# Patient Record
Sex: FEMALE | Race: BLACK OR AFRICAN AMERICAN | NOT HISPANIC OR LATINO | URBAN - METROPOLITAN AREA
[De-identification: names, ages, dates, MRNs, and addresses within clinical notes are randomized per-mention and may not be internally consistent; named-entity substitution may affect disease eponyms.]

---

## 2017-01-30 ENCOUNTER — OUTPATIENT (OUTPATIENT)
Dept: OUTPATIENT SERVICES | Facility: HOSPITAL | Age: 32
LOS: 1 days | Discharge: HOME | End: 2017-01-30

## 2017-01-30 ENCOUNTER — APPOINTMENT (OUTPATIENT)
Dept: OTOLARYNGOLOGY | Facility: CLINIC | Age: 32
End: 2017-01-30

## 2017-01-30 VITALS
HEART RATE: 61 BPM | WEIGHT: 200 LBS | BODY MASS INDEX: 33.32 KG/M2 | HEIGHT: 65 IN | SYSTOLIC BLOOD PRESSURE: 110 MMHG | DIASTOLIC BLOOD PRESSURE: 71 MMHG

## 2017-01-30 DIAGNOSIS — Z87.891 PERSONAL HISTORY OF NICOTINE DEPENDENCE: ICD-10-CM

## 2017-01-30 DIAGNOSIS — Z78.9 OTHER SPECIFIED HEALTH STATUS: ICD-10-CM

## 2017-01-30 DIAGNOSIS — M50.221 OTHER CERVICAL DISC DISPLACEMENT AT C4-C5 LEVEL: ICD-10-CM

## 2017-01-30 DIAGNOSIS — Z83.3 FAMILY HISTORY OF DIABETES MELLITUS: ICD-10-CM

## 2017-02-02 ENCOUNTER — RESULT REVIEW (OUTPATIENT)
Age: 32
End: 2017-02-02

## 2017-02-07 DIAGNOSIS — C73 MALIGNANT NEOPLASM OF THYROID GLAND: ICD-10-CM

## 2017-02-07 DIAGNOSIS — J38.7 OTHER DISEASES OF LARYNX: ICD-10-CM

## 2017-02-09 ENCOUNTER — FORM ENCOUNTER (OUTPATIENT)
Age: 32
End: 2017-02-09

## 2017-02-10 ENCOUNTER — OUTPATIENT (OUTPATIENT)
Dept: OUTPATIENT SERVICES | Facility: HOSPITAL | Age: 32
LOS: 1 days | End: 2017-02-10
Payer: MEDICAID

## 2017-02-10 ENCOUNTER — APPOINTMENT (OUTPATIENT)
Dept: ULTRASOUND IMAGING | Facility: IMAGING CENTER | Age: 32
End: 2017-02-10

## 2017-02-10 DIAGNOSIS — Z00.8 ENCOUNTER FOR OTHER GENERAL EXAMINATION: ICD-10-CM

## 2017-02-13 ENCOUNTER — APPOINTMENT (OUTPATIENT)
Dept: OTOLARYNGOLOGY | Facility: CLINIC | Age: 32
End: 2017-02-13

## 2017-02-13 VITALS
SYSTOLIC BLOOD PRESSURE: 108 MMHG | WEIGHT: 200 LBS | HEIGHT: 65 IN | DIASTOLIC BLOOD PRESSURE: 74 MMHG | BODY MASS INDEX: 33.32 KG/M2 | HEART RATE: 69 BPM

## 2017-02-21 ENCOUNTER — APPOINTMENT (OUTPATIENT)
Dept: SPINE | Facility: CLINIC | Age: 32
End: 2017-02-21

## 2017-03-20 ENCOUNTER — OUTPATIENT (OUTPATIENT)
Dept: OUTPATIENT SERVICES | Facility: HOSPITAL | Age: 32
LOS: 1 days | End: 2017-03-20

## 2017-03-20 VITALS
OXYGEN SATURATION: 99 % | HEIGHT: 64 IN | WEIGHT: 216.05 LBS | RESPIRATION RATE: 16 BRPM | DIASTOLIC BLOOD PRESSURE: 68 MMHG | HEART RATE: 65 BPM | TEMPERATURE: 99 F | SYSTOLIC BLOOD PRESSURE: 112 MMHG

## 2017-03-20 DIAGNOSIS — C73 MALIGNANT NEOPLASM OF THYROID GLAND: ICD-10-CM

## 2017-03-20 DIAGNOSIS — Z98.890 OTHER SPECIFIED POSTPROCEDURAL STATES: Chronic | ICD-10-CM

## 2017-03-20 LAB
BUN SERPL-MCNC: 12 MG/DL — SIGNIFICANT CHANGE UP (ref 7–23)
CALCIUM SERPL-MCNC: 9 MG/DL — SIGNIFICANT CHANGE UP (ref 8.4–10.5)
CHLORIDE SERPL-SCNC: 102 MMOL/L — SIGNIFICANT CHANGE UP (ref 98–107)
CO2 SERPL-SCNC: 26 MMOL/L — SIGNIFICANT CHANGE UP (ref 22–31)
CREAT SERPL-MCNC: 0.61 MG/DL — SIGNIFICANT CHANGE UP (ref 0.5–1.3)
GLUCOSE SERPL-MCNC: 76 MG/DL — SIGNIFICANT CHANGE UP (ref 70–99)
HCG SERPL-ACNC: < 5 MIU/ML — SIGNIFICANT CHANGE UP
HCT VFR BLD CALC: 39 % — SIGNIFICANT CHANGE UP (ref 34.5–45)
HGB BLD-MCNC: 12.8 G/DL — SIGNIFICANT CHANGE UP (ref 11.5–15.5)
MCHC RBC-ENTMCNC: 28.2 PG — SIGNIFICANT CHANGE UP (ref 27–34)
MCHC RBC-ENTMCNC: 32.8 % — SIGNIFICANT CHANGE UP (ref 32–36)
MCV RBC AUTO: 85.9 FL — SIGNIFICANT CHANGE UP (ref 80–100)
PLATELET # BLD AUTO: 271 K/UL — SIGNIFICANT CHANGE UP (ref 150–400)
PMV BLD: 10 FL — SIGNIFICANT CHANGE UP (ref 7–13)
POTASSIUM SERPL-MCNC: 4 MMOL/L — SIGNIFICANT CHANGE UP (ref 3.5–5.3)
POTASSIUM SERPL-SCNC: 4 MMOL/L — SIGNIFICANT CHANGE UP (ref 3.5–5.3)
RBC # BLD: 4.54 M/UL — SIGNIFICANT CHANGE UP (ref 3.8–5.2)
RBC # FLD: 13.5 % — SIGNIFICANT CHANGE UP (ref 10.3–14.5)
SODIUM SERPL-SCNC: 142 MMOL/L — SIGNIFICANT CHANGE UP (ref 135–145)
TSH SERPL-MCNC: 2.1 UIU/ML — SIGNIFICANT CHANGE UP (ref 0.27–4.2)
WBC # BLD: 7.44 K/UL — SIGNIFICANT CHANGE UP (ref 3.8–10.5)
WBC # FLD AUTO: 7.44 K/UL — SIGNIFICANT CHANGE UP (ref 3.8–10.5)

## 2017-03-20 RX ORDER — SODIUM CHLORIDE 9 MG/ML
3 INJECTION INTRAMUSCULAR; INTRAVENOUS; SUBCUTANEOUS EVERY 8 HOURS
Qty: 0 | Refills: 0 | Status: DISCONTINUED | OUTPATIENT
Start: 2017-04-06 | End: 2017-04-07

## 2017-03-20 RX ORDER — SODIUM CHLORIDE 9 MG/ML
1000 INJECTION, SOLUTION INTRAVENOUS
Qty: 0 | Refills: 0 | Status: DISCONTINUED | OUTPATIENT
Start: 2017-04-06 | End: 2017-04-07

## 2017-03-20 NOTE — H&P PST ADULT - RS GEN PE MLT RESP DETAILS PC
breath sounds equal/no chest wall tenderness/airway patent/respirations non-labored/clear to auscultation bilaterally/good air movement

## 2017-03-20 NOTE — H&P PST ADULT - PMH
Herniated nucleus pulposus, L4-5    Thyroid cancer  pt reports "my blood levels have never been checked since this all started" Herniated nucleus pulposus, L4-5    Irregular menstruation    Thyroid cancer  pt reports "my  TSH levels have never been checked since this all started" Acid reflux    Herniated nucleus pulposus, L4-5    Irregular menstruation    Thyroid cancer  pt reports "my  TSH levels have never been checked since this all started"

## 2017-03-20 NOTE — H&P PST ADULT - NEGATIVE BREAST SYMPTOMS
no breast lump L/no breast tenderness L/no breast lump R/no breast tenderness R/no nipple discharge L/no nipple discharge R

## 2017-03-20 NOTE — H&P PST ADULT - HISTORY OF PRESENT ILLNESS
30 y/o female with PMH of Irregular Menstrual Periods presents to PST for preoperative evaluation with diagnosis of malignant neoplasm of thyroid gland. Pt reports October 2015 she started to experience dysphagia and dry cough x 1 month. Seen by Endocrinologist and sent for sonogram followed by thyroid biopsy. 32 y/o female with PMH of Irregular Menstrual Periods presents to PST for preoperative evaluation with diagnosis of malignant neoplasm of thyroid gland. Pt reports October 2015 she started to experience dysphagia and dry cough x 1 month. Seen by Endocrinologist and sent for sonogram followed by thyroid biopsy which came back positive for malignancy. Pt still reports occasional dry cough but denies hoarseness or dysphagia. She is scheduled for Right Hemithyroidectomy Possible Total Thyroidectomy with Central Neck Dissection on 3/22/2017 32 y/o female with PMH of Irregular Menstruation and Acid Reflux presents to PST for preoperative evaluation with diagnosis of malignant neoplasm of thyroid gland. Pt reports October 2015 she started to experience dysphagia and dry cough x 1 month. Seen by Endocrinologist and sent for sonogram followed by thyroid biopsy which came back positive for malignancy. Pt still reports occasional dry cough but denies hoarseness or dysphagia. She is scheduled for Right Hemithyroidectomy Possible Total Thyroidectomy with Central Neck Dissection on 3/22/2017 32 y/o female with PMH of Irregular Menstruation and Acid Reflux presents to PST for preoperative evaluation with diagnosis of malignant neoplasm of thyroid gland. Pt reports October 2015 she started to experience dysphagia and dry cough x 1 month. Seen by Endocrinologist and sent for sonogram followed by thyroid biopsy which came back positive for malignancy. Pt still reports occasional dry cough but denies hoarseness or dysphagia. She is scheduled for Right Hemithyroidectomy Possible Total Thyroidectomy with Central Neck Dissection on 4/6/2017

## 2017-03-20 NOTE — H&P PST ADULT - NEGATIVE GASTROINTESTINAL SYMPTOMS
no abdominal pain/no diarrhea/no change in bowel habits/no nausea/no constipation/no vomiting/no melena

## 2017-03-20 NOTE — H&P PST ADULT - NSANTHOSAYNRD_GEN_A_CORE
No. JER screening performed.  STOP BANG Legend: 0-2 = LOW Risk; 3-4 = INTERMEDIATE Risk; 5-8 = HIGH Risk

## 2017-03-20 NOTE — H&P PST ADULT - FAMILY HISTORY
Mother  Still living? Yes, Estimated age: Age Unknown  Family history of thyroid cancer, Age at diagnosis: Age Unknown

## 2017-03-20 NOTE — H&P PST ADULT - NEGATIVE ENMT SYMPTOMS
no post-nasal discharge/no vertigo/no nasal congestion/no throat pain/no hearing difficulty/no dysphagia/no sinus symptoms/no nose bleeds/no tinnitus/no ear pain/no gum bleeding

## 2017-03-20 NOTE — H&P PST ADULT - LYMPHATIC
anterior cervical L/posterior cervical R/posterior cervical L/supraclavicular R/supraclavicular L/anterior cervical R

## 2017-03-20 NOTE — H&P PST ADULT - PROBLEM SELECTOR PLAN 1
Scheduled for Right Hemithyroidectomy Possible Total Thyroidectomy with Central Neck Dissection on 4/6/2017  Pre op instructions given, pt verbalized understanding   Chlorhexidine wash and GI prophylaxis provided

## 2017-03-20 NOTE — H&P PST ADULT - NEGATIVE MUSCULOSKELETAL SYMPTOMS
no muscle weakness/no neck pain/no joint swelling/no muscle cramps/no arthralgia/no back pain/no arthritis

## 2017-04-05 ENCOUNTER — RESULT REVIEW (OUTPATIENT)
Age: 32
End: 2017-04-05

## 2017-04-05 NOTE — ASU PATIENT PROFILE, ADULT - PMH
Acid reflux    Herniated nucleus pulposus, L4-5    Irregular menstruation    Thyroid cancer  pt reports "my  TSH levels have never been checked since this all started"

## 2017-04-06 ENCOUNTER — INPATIENT (INPATIENT)
Facility: HOSPITAL | Age: 32
LOS: 0 days | Discharge: HOME | End: 2017-04-07
Attending: OTOLARYNGOLOGY | Admitting: OTOLARYNGOLOGY
Payer: MEDICAID

## 2017-04-06 ENCOUNTER — APPOINTMENT (OUTPATIENT)
Dept: OTOLARYNGOLOGY | Facility: HOSPITAL | Age: 32
End: 2017-04-06

## 2017-04-06 VITALS
SYSTOLIC BLOOD PRESSURE: 103 MMHG | HEART RATE: 70 BPM | OXYGEN SATURATION: 100 % | DIASTOLIC BLOOD PRESSURE: 75 MMHG | WEIGHT: 216.05 LBS | TEMPERATURE: 98 F | HEIGHT: 64 IN | RESPIRATION RATE: 15 BRPM

## 2017-04-06 DIAGNOSIS — C73 MALIGNANT NEOPLASM OF THYROID GLAND: ICD-10-CM

## 2017-04-06 DIAGNOSIS — Z98.890 OTHER SPECIFIED POSTPROCEDURAL STATES: Chronic | ICD-10-CM

## 2017-04-06 LAB
BLD GP AB SCN SERPL QL: NEGATIVE — SIGNIFICANT CHANGE UP
HCG UR QL: NEGATIVE — SIGNIFICANT CHANGE UP
RH IG SCN BLD-IMP: POSITIVE — SIGNIFICANT CHANGE UP

## 2017-04-06 PROCEDURE — 60220 PARTIAL REMOVAL OF THYROID: CPT | Mod: GC

## 2017-04-06 PROCEDURE — 88307 TISSUE EXAM BY PATHOLOGIST: CPT | Mod: 26

## 2017-04-06 RX ORDER — FENTANYL CITRATE 50 UG/ML
50 INJECTION INTRAVENOUS
Qty: 0 | Refills: 0 | Status: DISCONTINUED | OUTPATIENT
Start: 2017-04-06 | End: 2017-04-07

## 2017-04-06 RX ORDER — SODIUM CHLORIDE 9 MG/ML
1000 INJECTION INTRAMUSCULAR; INTRAVENOUS; SUBCUTANEOUS
Qty: 0 | Refills: 0 | Status: DISCONTINUED | OUTPATIENT
Start: 2017-04-06 | End: 2017-04-07

## 2017-04-06 RX ORDER — SODIUM CHLORIDE 9 MG/ML
500 INJECTION, SOLUTION INTRAVENOUS ONCE
Qty: 0 | Refills: 0 | Status: COMPLETED | OUTPATIENT
Start: 2017-04-06 | End: 2017-04-06

## 2017-04-06 RX ORDER — FENTANYL CITRATE 50 UG/ML
25 INJECTION INTRAVENOUS
Qty: 0 | Refills: 0 | Status: DISCONTINUED | OUTPATIENT
Start: 2017-04-06 | End: 2017-04-07

## 2017-04-06 RX ORDER — OXYCODONE HYDROCHLORIDE 5 MG/1
10 TABLET ORAL ONCE
Qty: 0 | Refills: 0 | Status: DISCONTINUED | OUTPATIENT
Start: 2017-04-06 | End: 2017-04-06

## 2017-04-06 RX ORDER — HYDROMORPHONE HYDROCHLORIDE 2 MG/ML
1 INJECTION INTRAMUSCULAR; INTRAVENOUS; SUBCUTANEOUS ONCE
Qty: 0 | Refills: 0 | Status: DISCONTINUED | OUTPATIENT
Start: 2017-04-06 | End: 2017-04-06

## 2017-04-06 RX ORDER — ONDANSETRON 8 MG/1
4 TABLET, FILM COATED ORAL ONCE
Qty: 0 | Refills: 0 | Status: DISCONTINUED | OUTPATIENT
Start: 2017-04-06 | End: 2017-04-07

## 2017-04-06 RX ADMIN — HYDROMORPHONE HYDROCHLORIDE 1 MILLIGRAM(S): 2 INJECTION INTRAMUSCULAR; INTRAVENOUS; SUBCUTANEOUS at 22:10

## 2017-04-06 RX ADMIN — OXYCODONE HYDROCHLORIDE 10 MILLIGRAM(S): 5 TABLET ORAL at 23:22

## 2017-04-06 RX ADMIN — SODIUM CHLORIDE 3 MILLILITER(S): 9 INJECTION INTRAMUSCULAR; INTRAVENOUS; SUBCUTANEOUS at 22:00

## 2017-04-06 RX ADMIN — OXYCODONE HYDROCHLORIDE 10 MILLIGRAM(S): 5 TABLET ORAL at 22:56

## 2017-04-06 RX ADMIN — FENTANYL CITRATE 50 MICROGRAM(S): 50 INJECTION INTRAVENOUS at 21:45

## 2017-04-06 RX ADMIN — FENTANYL CITRATE 50 MICROGRAM(S): 50 INJECTION INTRAVENOUS at 20:50

## 2017-04-06 RX ADMIN — FENTANYL CITRATE 50 MICROGRAM(S): 50 INJECTION INTRAVENOUS at 21:35

## 2017-04-06 RX ADMIN — SODIUM CHLORIDE 70 MILLILITER(S): 9 INJECTION INTRAMUSCULAR; INTRAVENOUS; SUBCUTANEOUS at 20:17

## 2017-04-06 RX ADMIN — HYDROMORPHONE HYDROCHLORIDE 1 MILLIGRAM(S): 2 INJECTION INTRAMUSCULAR; INTRAVENOUS; SUBCUTANEOUS at 22:04

## 2017-04-06 RX ADMIN — SODIUM CHLORIDE 30 MILLILITER(S): 9 INJECTION, SOLUTION INTRAVENOUS at 14:54

## 2017-04-06 RX ADMIN — FENTANYL CITRATE 50 MICROGRAM(S): 50 INJECTION INTRAVENOUS at 21:05

## 2017-04-06 RX ADMIN — FENTANYL CITRATE 50 MICROGRAM(S): 50 INJECTION INTRAVENOUS at 22:00

## 2017-04-06 RX ADMIN — SODIUM CHLORIDE 1000 MILLILITER(S): 9 INJECTION, SOLUTION INTRAVENOUS at 21:52

## 2017-04-06 NOTE — ASU DISCHARGE PLAN (ADULT/PEDIATRIC). - MEDICATION SUMMARY - MEDICATIONS TO TAKE
I will START or STAY ON the medications listed below when I get home from the hospital:    Percocet 5/325 oral tablet  -- 1 tab(s) by mouth every 6 hours, As Needed, Mild Pain (1 - 3) MDD:6  -- Indication: For Malignant neoplasm of thyroid gland

## 2017-04-07 ENCOUNTER — TRANSCRIPTION ENCOUNTER (OUTPATIENT)
Age: 32
End: 2017-04-07

## 2017-04-07 VITALS
SYSTOLIC BLOOD PRESSURE: 100 MMHG | OXYGEN SATURATION: 99 % | DIASTOLIC BLOOD PRESSURE: 50 MMHG | HEART RATE: 65 BPM | RESPIRATION RATE: 17 BRPM

## 2017-04-07 RX ORDER — ACETAMINOPHEN 500 MG
1000 TABLET ORAL ONCE
Qty: 0 | Refills: 0 | Status: COMPLETED | OUTPATIENT
Start: 2017-04-07 | End: 2017-04-07

## 2017-04-07 RX ADMIN — Medication 1000 MILLIGRAM(S): at 01:11

## 2017-04-07 RX ADMIN — Medication 400 MILLIGRAM(S): at 00:50

## 2017-04-11 ENCOUNTER — APPOINTMENT (OUTPATIENT)
Dept: SPINE | Facility: CLINIC | Age: 32
End: 2017-04-11

## 2017-04-13 ENCOUNTER — APPOINTMENT (OUTPATIENT)
Dept: OTOLARYNGOLOGY | Facility: CLINIC | Age: 32
End: 2017-04-13

## 2017-04-13 DIAGNOSIS — M54.2 CERVICALGIA: ICD-10-CM

## 2017-04-13 PROCEDURE — 76536 US EXAM OF HEAD AND NECK: CPT

## 2017-05-16 ENCOUNTER — APPOINTMENT (OUTPATIENT)
Dept: SPINE | Facility: CLINIC | Age: 32
End: 2017-05-16

## 2017-05-16 VITALS
HEIGHT: 65 IN | DIASTOLIC BLOOD PRESSURE: 72 MMHG | SYSTOLIC BLOOD PRESSURE: 102 MMHG | WEIGHT: 210 LBS | HEART RATE: 73 BPM | BODY MASS INDEX: 34.99 KG/M2

## 2017-05-16 DIAGNOSIS — M51.26 OTHER INTERVERTEBRAL DISC DISPLACEMENT, LUMBAR REGION: ICD-10-CM

## 2017-05-17 ENCOUNTER — APPOINTMENT (OUTPATIENT)
Dept: OTOLARYNGOLOGY | Facility: CLINIC | Age: 32
End: 2017-05-17

## 2017-05-18 ENCOUNTER — APPOINTMENT (OUTPATIENT)
Dept: OTOLARYNGOLOGY | Facility: CLINIC | Age: 32
End: 2017-05-18

## 2017-05-18 VITALS
HEIGHT: 65 IN | WEIGHT: 210 LBS | HEART RATE: 64 BPM | BODY MASS INDEX: 34.99 KG/M2 | SYSTOLIC BLOOD PRESSURE: 114 MMHG | DIASTOLIC BLOOD PRESSURE: 77 MMHG

## 2017-05-23 ENCOUNTER — APPOINTMENT (OUTPATIENT)
Dept: ULTRASOUND IMAGING | Facility: IMAGING CENTER | Age: 32
End: 2017-05-23

## 2017-05-23 ENCOUNTER — OUTPATIENT (OUTPATIENT)
Dept: OUTPATIENT SERVICES | Facility: HOSPITAL | Age: 32
LOS: 1 days | End: 2017-05-23
Payer: MEDICAID

## 2017-05-23 ENCOUNTER — APPOINTMENT (OUTPATIENT)
Dept: OTOLARYNGOLOGY | Facility: CLINIC | Age: 32
End: 2017-05-23

## 2017-05-23 VITALS
WEIGHT: 210 LBS | BODY MASS INDEX: 34.99 KG/M2 | HEART RATE: 64 BPM | DIASTOLIC BLOOD PRESSURE: 60 MMHG | HEIGHT: 65 IN | SYSTOLIC BLOOD PRESSURE: 100 MMHG

## 2017-05-23 DIAGNOSIS — R49.0 DYSPHONIA: ICD-10-CM

## 2017-05-23 DIAGNOSIS — Z98.890 OTHER SPECIFIED POSTPROCEDURAL STATES: Chronic | ICD-10-CM

## 2017-05-23 DIAGNOSIS — C73 MALIGNANT NEOPLASM OF THYROID GLAND: ICD-10-CM

## 2017-05-23 PROCEDURE — 76536 US EXAM OF HEAD AND NECK: CPT

## 2017-05-24 ENCOUNTER — APPOINTMENT (OUTPATIENT)
Dept: ULTRASOUND IMAGING | Facility: IMAGING CENTER | Age: 32
End: 2017-05-24

## 2017-05-29 PROBLEM — R49.0 DYSPHONIA: Status: ACTIVE | Noted: 2017-05-29

## 2017-06-12 ENCOUNTER — APPOINTMENT (OUTPATIENT)
Dept: OTOLARYNGOLOGY | Facility: CLINIC | Age: 32
End: 2017-06-12

## 2017-08-03 ENCOUNTER — RX RENEWAL (OUTPATIENT)
Age: 32
End: 2017-08-03

## 2017-08-03 ENCOUNTER — CLINICAL ADVICE (OUTPATIENT)
Age: 32
End: 2017-08-03

## 2017-08-04 ENCOUNTER — APPOINTMENT (OUTPATIENT)
Dept: RADIOLOGY | Facility: IMAGING CENTER | Age: 32
End: 2017-08-04
Payer: MEDICAID

## 2017-08-04 ENCOUNTER — OUTPATIENT (OUTPATIENT)
Dept: OUTPATIENT SERVICES | Facility: HOSPITAL | Age: 32
LOS: 1 days | End: 2017-08-04
Payer: MEDICAID

## 2017-08-04 DIAGNOSIS — Z00.8 ENCOUNTER FOR OTHER GENERAL EXAMINATION: ICD-10-CM

## 2017-08-04 DIAGNOSIS — M54.16 RADICULOPATHY, LUMBAR REGION: ICD-10-CM

## 2017-08-04 DIAGNOSIS — Z98.890 OTHER SPECIFIED POSTPROCEDURAL STATES: Chronic | ICD-10-CM

## 2017-08-04 PROCEDURE — 72100 X-RAY EXAM L-S SPINE 2/3 VWS: CPT

## 2017-08-04 PROCEDURE — 72100 X-RAY EXAM L-S SPINE 2/3 VWS: CPT | Mod: 26

## 2017-08-08 ENCOUNTER — APPOINTMENT (OUTPATIENT)
Dept: SPINE | Facility: CLINIC | Age: 32
End: 2017-08-08
Payer: MEDICAID

## 2017-08-08 ENCOUNTER — APPOINTMENT (OUTPATIENT)
Dept: MRI IMAGING | Facility: CLINIC | Age: 32
End: 2017-08-08
Payer: MEDICAID

## 2017-08-08 ENCOUNTER — OUTPATIENT (OUTPATIENT)
Dept: OUTPATIENT SERVICES | Facility: HOSPITAL | Age: 32
LOS: 1 days | End: 2017-08-08
Payer: MEDICAID

## 2017-08-08 VITALS
BODY MASS INDEX: 34.99 KG/M2 | DIASTOLIC BLOOD PRESSURE: 70 MMHG | WEIGHT: 210 LBS | HEIGHT: 65 IN | SYSTOLIC BLOOD PRESSURE: 103 MMHG | HEART RATE: 60 BPM

## 2017-08-08 DIAGNOSIS — Z98.890 OTHER SPECIFIED POSTPROCEDURAL STATES: Chronic | ICD-10-CM

## 2017-08-08 DIAGNOSIS — M51.26 OTHER INTERVERTEBRAL DISC DISPLACEMENT, LUMBAR REGION: ICD-10-CM

## 2017-08-08 PROCEDURE — 72148 MRI LUMBAR SPINE W/O DYE: CPT | Mod: 26

## 2017-08-08 PROCEDURE — 72148 MRI LUMBAR SPINE W/O DYE: CPT

## 2017-08-08 PROCEDURE — 99213 OFFICE O/P EST LOW 20 MIN: CPT

## 2017-08-08 RX ORDER — ALBUTEROL SULFATE 2.5 MG/3ML
VIAL, NEBULIZER (ML) INHALATION
Refills: 0 | Status: COMPLETED | COMMUNITY
End: 2017-08-08

## 2017-08-08 RX ORDER — OMEPRAZOLE 40 MG/1
40 CAPSULE, DELAYED RELEASE ORAL
Qty: 30 | Refills: 3 | Status: COMPLETED | COMMUNITY
Start: 2017-02-06 | End: 2017-08-08

## 2017-08-22 ENCOUNTER — APPOINTMENT (OUTPATIENT)
Dept: SPINE | Facility: CLINIC | Age: 32
End: 2017-08-22
Payer: MEDICAID

## 2017-08-22 VITALS
DIASTOLIC BLOOD PRESSURE: 66 MMHG | BODY MASS INDEX: 34.49 KG/M2 | WEIGHT: 207 LBS | HEIGHT: 65 IN | SYSTOLIC BLOOD PRESSURE: 98 MMHG | HEART RATE: 59 BPM

## 2017-08-22 PROCEDURE — 99213 OFFICE O/P EST LOW 20 MIN: CPT

## 2018-01-23 ENCOUNTER — APPOINTMENT (OUTPATIENT)
Dept: OTOLARYNGOLOGY | Facility: CLINIC | Age: 33
End: 2018-01-23
Payer: MEDICAID

## 2018-01-23 ENCOUNTER — OUTPATIENT (OUTPATIENT)
Dept: OUTPATIENT SERVICES | Facility: HOSPITAL | Age: 33
LOS: 1 days | Discharge: HOME | End: 2018-01-23

## 2018-01-23 VITALS
SYSTOLIC BLOOD PRESSURE: 95 MMHG | HEART RATE: 72 BPM | BODY MASS INDEX: 34.49 KG/M2 | HEIGHT: 65 IN | DIASTOLIC BLOOD PRESSURE: 62 MMHG | WEIGHT: 207 LBS

## 2018-01-23 DIAGNOSIS — C73 MALIGNANT NEOPLASM OF THYROID GLAND: ICD-10-CM

## 2018-01-23 DIAGNOSIS — Z98.890 OTHER SPECIFIED POSTPROCEDURAL STATES: Chronic | ICD-10-CM

## 2018-01-23 DIAGNOSIS — K21.9 GASTRO-ESOPHAGEAL REFLUX DISEASE W/OUT ESOPHAGITIS: ICD-10-CM

## 2018-01-23 PROCEDURE — 99214 OFFICE O/P EST MOD 30 MIN: CPT | Mod: 25

## 2018-01-23 PROCEDURE — 31575 DIAGNOSTIC LARYNGOSCOPY: CPT

## 2018-01-23 RX ORDER — FLUTICASONE PROPIONATE 50 UG/1
50 SPRAY, METERED NASAL
Qty: 16 | Refills: 0 | Status: DISCONTINUED | COMMUNITY
Start: 2017-04-27 | End: 2018-01-23

## 2018-01-23 RX ORDER — CYCLOBENZAPRINE HYDROCHLORIDE 5 MG/1
5 TABLET, FILM COATED ORAL EVERY 8 HOURS
Qty: 42 | Refills: 0 | Status: COMPLETED | COMMUNITY
Start: 2017-08-03 | End: 2018-01-23

## 2018-01-23 RX ORDER — ACETAMINOPHEN 500 MG/1
500 TABLET ORAL
Qty: 30 | Refills: 0 | Status: DISCONTINUED | COMMUNITY
Start: 2017-06-09 | End: 2018-01-23

## 2018-01-23 RX ORDER — IBUPROFEN 800 MG/1
800 TABLET, FILM COATED ORAL
Qty: 28 | Refills: 0 | Status: DISCONTINUED | COMMUNITY
Start: 2017-06-15 | End: 2018-01-23

## 2018-01-23 RX ORDER — LORATADINE 10 MG/1
10 TABLET ORAL
Qty: 30 | Refills: 0 | Status: DISCONTINUED | COMMUNITY
Start: 2017-04-04 | End: 2018-01-23

## 2018-01-23 RX ORDER — OXYCODONE AND ACETAMINOPHEN 5; 325 MG/1; MG/1
5-325 TABLET ORAL EVERY 6 HOURS
Qty: 56 | Refills: 0 | Status: DISCONTINUED | COMMUNITY
Start: 2017-08-03 | End: 2018-01-23

## 2018-01-23 RX ORDER — SODIUM CHLORIDE 0.65 %
0.65 AEROSOL, SPRAY (ML) NASAL
Qty: 44 | Refills: 0 | Status: DISCONTINUED | COMMUNITY
Start: 2017-03-10 | End: 2018-01-23

## 2018-01-30 DIAGNOSIS — C73 MALIGNANT NEOPLASM OF THYROID GLAND: ICD-10-CM

## 2018-01-30 DIAGNOSIS — K21.9 GASTRO-ESOPHAGEAL REFLUX DISEASE WITHOUT ESOPHAGITIS: ICD-10-CM

## 2018-04-30 ENCOUNTER — APPOINTMENT (OUTPATIENT)
Dept: OTOLARYNGOLOGY | Facility: CLINIC | Age: 33
End: 2018-04-30

## 2018-08-01 ENCOUNTER — RX RENEWAL (OUTPATIENT)
Age: 33
End: 2018-08-01

## 2018-08-01 PROBLEM — N92.6 IRREGULAR MENSTRUATION, UNSPECIFIED: Chronic | Status: ACTIVE | Noted: 2017-03-20

## 2018-08-01 PROBLEM — K21.9 GASTRO-ESOPHAGEAL REFLUX DISEASE WITHOUT ESOPHAGITIS: Chronic | Status: ACTIVE | Noted: 2017-03-20

## 2018-08-03 ENCOUNTER — OUTPATIENT (OUTPATIENT)
Dept: OUTPATIENT SERVICES | Facility: HOSPITAL | Age: 33
LOS: 1 days | End: 2018-08-03
Payer: MEDICAID

## 2018-08-03 ENCOUNTER — APPOINTMENT (OUTPATIENT)
Dept: RADIOLOGY | Facility: IMAGING CENTER | Age: 33
End: 2018-08-03
Payer: MEDICAID

## 2018-08-03 DIAGNOSIS — Z98.890 OTHER SPECIFIED POSTPROCEDURAL STATES: ICD-10-CM

## 2018-08-03 DIAGNOSIS — Z98.890 OTHER SPECIFIED POSTPROCEDURAL STATES: Chronic | ICD-10-CM

## 2018-08-03 PROCEDURE — 72100 X-RAY EXAM L-S SPINE 2/3 VWS: CPT | Mod: 26

## 2018-08-03 PROCEDURE — 72100 X-RAY EXAM L-S SPINE 2/3 VWS: CPT

## 2018-08-14 ENCOUNTER — APPOINTMENT (OUTPATIENT)
Dept: SPINE | Facility: CLINIC | Age: 33
End: 2018-08-14
Payer: MEDICAID

## 2018-08-14 VITALS
DIASTOLIC BLOOD PRESSURE: 73 MMHG | HEART RATE: 70 BPM | HEIGHT: 65 IN | BODY MASS INDEX: 34.49 KG/M2 | SYSTOLIC BLOOD PRESSURE: 106 MMHG | WEIGHT: 207 LBS

## 2018-08-14 PROCEDURE — 99213 OFFICE O/P EST LOW 20 MIN: CPT

## 2018-08-16 ENCOUNTER — APPOINTMENT (OUTPATIENT)
Dept: MRI IMAGING | Facility: CLINIC | Age: 33
End: 2018-08-16
Payer: MEDICAID

## 2018-08-16 ENCOUNTER — OUTPATIENT (OUTPATIENT)
Dept: OUTPATIENT SERVICES | Facility: HOSPITAL | Age: 33
LOS: 1 days | End: 2018-08-16
Payer: MEDICAID

## 2018-08-16 DIAGNOSIS — Z98.890 OTHER SPECIFIED POSTPROCEDURAL STATES: ICD-10-CM

## 2018-08-16 DIAGNOSIS — Z98.890 OTHER SPECIFIED POSTPROCEDURAL STATES: Chronic | ICD-10-CM

## 2018-08-16 PROCEDURE — A9585: CPT

## 2018-08-16 PROCEDURE — 72158 MRI LUMBAR SPINE W/O & W/DYE: CPT | Mod: 26

## 2018-08-16 PROCEDURE — 72158 MRI LUMBAR SPINE W/O & W/DYE: CPT

## 2018-08-21 ENCOUNTER — APPOINTMENT (OUTPATIENT)
Dept: SPINE | Facility: CLINIC | Age: 33
End: 2018-08-21
Payer: MEDICAID

## 2018-08-21 VITALS
WEIGHT: 208 LBS | HEIGHT: 65 IN | BODY MASS INDEX: 34.66 KG/M2 | SYSTOLIC BLOOD PRESSURE: 110 MMHG | DIASTOLIC BLOOD PRESSURE: 74 MMHG | HEART RATE: 65 BPM

## 2018-08-21 PROCEDURE — 99215 OFFICE O/P EST HI 40 MIN: CPT

## 2018-09-13 ENCOUNTER — OUTPATIENT (OUTPATIENT)
Dept: OUTPATIENT SERVICES | Facility: HOSPITAL | Age: 33
LOS: 1 days | End: 2018-09-13
Payer: MEDICAID

## 2018-09-13 VITALS
SYSTOLIC BLOOD PRESSURE: 100 MMHG | DIASTOLIC BLOOD PRESSURE: 62 MMHG | OXYGEN SATURATION: 100 % | WEIGHT: 214.07 LBS | TEMPERATURE: 99 F | HEIGHT: 65 IN | HEART RATE: 60 BPM | RESPIRATION RATE: 16 BRPM

## 2018-09-13 DIAGNOSIS — E89.0 POSTPROCEDURAL HYPOTHYROIDISM: Chronic | ICD-10-CM

## 2018-09-13 DIAGNOSIS — M51.26 OTHER INTERVERTEBRAL DISC DISPLACEMENT, LUMBAR REGION: ICD-10-CM

## 2018-09-13 DIAGNOSIS — Z98.890 OTHER SPECIFIED POSTPROCEDURAL STATES: Chronic | ICD-10-CM

## 2018-09-13 DIAGNOSIS — Z01.818 ENCOUNTER FOR OTHER PREPROCEDURAL EXAMINATION: ICD-10-CM

## 2018-09-13 DIAGNOSIS — Z29.9 ENCOUNTER FOR PROPHYLACTIC MEASURES, UNSPECIFIED: ICD-10-CM

## 2018-09-13 LAB
ANION GAP SERPL CALC-SCNC: 11 MMOL/L — SIGNIFICANT CHANGE UP (ref 5–17)
BLD GP AB SCN SERPL QL: NEGATIVE — SIGNIFICANT CHANGE UP
BUN SERPL-MCNC: 7 MG/DL — SIGNIFICANT CHANGE UP (ref 7–23)
CALCIUM SERPL-MCNC: 9.1 MG/DL — SIGNIFICANT CHANGE UP (ref 8.4–10.5)
CHLORIDE SERPL-SCNC: 99 MMOL/L — SIGNIFICANT CHANGE UP (ref 96–108)
CO2 SERPL-SCNC: 28 MMOL/L — SIGNIFICANT CHANGE UP (ref 22–31)
CREAT SERPL-MCNC: 0.71 MG/DL — SIGNIFICANT CHANGE UP (ref 0.5–1.3)
GLUCOSE SERPL-MCNC: 76 MG/DL — SIGNIFICANT CHANGE UP (ref 70–99)
HCT VFR BLD CALC: 40.3 % — SIGNIFICANT CHANGE UP (ref 34.5–45)
HGB BLD-MCNC: 12.7 G/DL — SIGNIFICANT CHANGE UP (ref 11.5–15.5)
HIV 1+2 AB+HIV1 P24 AG SERPL QL IA: SIGNIFICANT CHANGE UP
MCHC RBC-ENTMCNC: 27.3 PG — SIGNIFICANT CHANGE UP (ref 27–34)
MCHC RBC-ENTMCNC: 31.5 GM/DL — LOW (ref 32–36)
MCV RBC AUTO: 86.5 FL — SIGNIFICANT CHANGE UP (ref 80–100)
MRSA PCR RESULT.: SIGNIFICANT CHANGE UP
PLATELET # BLD AUTO: 278 K/UL — SIGNIFICANT CHANGE UP (ref 150–400)
POTASSIUM SERPL-MCNC: 4.2 MMOL/L — SIGNIFICANT CHANGE UP (ref 3.5–5.3)
POTASSIUM SERPL-SCNC: 4.2 MMOL/L — SIGNIFICANT CHANGE UP (ref 3.5–5.3)
RBC # BLD: 4.66 M/UL — SIGNIFICANT CHANGE UP (ref 3.8–5.2)
RBC # FLD: 14.4 % — SIGNIFICANT CHANGE UP (ref 10.3–14.5)
RH IG SCN BLD-IMP: POSITIVE — SIGNIFICANT CHANGE UP
S AUREUS DNA NOSE QL NAA+PROBE: SIGNIFICANT CHANGE UP
SODIUM SERPL-SCNC: 138 MMOL/L — SIGNIFICANT CHANGE UP (ref 135–145)
WBC # BLD: 7.53 K/UL — SIGNIFICANT CHANGE UP (ref 3.8–10.5)
WBC # FLD AUTO: 7.53 K/UL — SIGNIFICANT CHANGE UP (ref 3.8–10.5)

## 2018-09-13 PROCEDURE — 87640 STAPH A DNA AMP PROBE: CPT

## 2018-09-13 PROCEDURE — 86850 RBC ANTIBODY SCREEN: CPT

## 2018-09-13 PROCEDURE — 80048 BASIC METABOLIC PNL TOTAL CA: CPT

## 2018-09-13 PROCEDURE — 87641 MR-STAPH DNA AMP PROBE: CPT

## 2018-09-13 PROCEDURE — 87389 HIV-1 AG W/HIV-1&-2 AB AG IA: CPT

## 2018-09-13 PROCEDURE — 85027 COMPLETE CBC AUTOMATED: CPT

## 2018-09-13 PROCEDURE — 86900 BLOOD TYPING SEROLOGIC ABO: CPT

## 2018-09-13 PROCEDURE — 86901 BLOOD TYPING SEROLOGIC RH(D): CPT

## 2018-09-13 PROCEDURE — G0463: CPT

## 2018-09-13 RX ORDER — LIDOCAINE HCL 20 MG/ML
0.2 VIAL (ML) INJECTION ONCE
Qty: 0 | Refills: 0 | Status: DISCONTINUED | OUTPATIENT
Start: 2018-09-20 | End: 2018-09-20

## 2018-09-13 RX ORDER — CEFAZOLIN SODIUM 1 G
2000 VIAL (EA) INJECTION ONCE
Qty: 0 | Refills: 0 | Status: DISCONTINUED | OUTPATIENT
Start: 2018-09-20 | End: 2018-09-23

## 2018-09-13 RX ORDER — SODIUM CHLORIDE 9 MG/ML
3 INJECTION INTRAMUSCULAR; INTRAVENOUS; SUBCUTANEOUS EVERY 8 HOURS
Qty: 0 | Refills: 0 | Status: DISCONTINUED | OUTPATIENT
Start: 2018-09-20 | End: 2018-09-20

## 2018-09-13 NOTE — H&P PST ADULT - MUSCULOSKELETAL
details… detailed exam ROM intact/normal strength/no joint swelling/no joint erythema/no joint warmth

## 2018-09-13 NOTE — H&P PST ADULT - ASSESSMENT
CAPRINI SCORE [CLOT]    AGE RELATED RISK FACTORS                                                       MOBILITY RELATED FACTORS  [ ] Age 41-60 years                                            (1 Point)                  [ ] Bed rest                                                        (1 Point)  [ ] Age: 61-74 years                                           (2 Points)                 [ ] Plaster cast                                                   (2 Points)  [ ] Age= 75 years                                              (3 Points)                 [ ] Bed bound for more than 72 hours                 (2 Points)    DISEASE RELATED RISK FACTORS                                               GENDER SPECIFIC FACTORS  [ ] Edema in the lower extremities                       (1 Point)                  [ ] Pregnancy                                                     (1 Point)  [ ] Varicose veins                                               (1 Point)                  [ ] Post-partum < 6 weeks                                   (1 Point)             [x ] BMI > 25 Kg/m2                                            (1 Point)                  [ ] Hormonal therapy  or oral contraception          (1 Point)                 [ ] Sepsis (in the previous month)                        (1 Point)                  [ ] History of pregnancy complications                 (1 point)  [ ] Pneumonia or serious lung disease                                               [ ] Unexplained or recurrent                     (1 Point)           (in the previous month)                               (1 Point)  [ ] Abnormal pulmonary function test                     (1 Point)                 SURGERY RELATED RISK FACTORS  [ ] Acute myocardial infarction                              (1 Point)                 [ ]  Section                                             (1 Point)  [ ] Congestive heart failure (in the previous month)  (1 Point)               [ ] Minor surgery                                                  (1 Point)   [ ] Inflammatory bowel disease                             (1 Point)                 [ ] Arthroscopic surgery                                        (2 Points)  [ ] Central venous access                                      (2 Points)                [x ] General surgery lasting more than 45 minutes   (2 Points)       [ ] Stroke (in the previous month)                          (5 Points)               [ ] Elective arthroplasty                                         (5 Points)                                                                                                                                               HEMATOLOGY RELATED FACTORS                                                 TRAUMA RELATED RISK FACTORS  [ ] Prior episodes of VTE                                     (3 Points)                 [ ] Fracture of the hip, pelvis, or leg                       (5 Points)  [ ] Positive family history for VTE                         (3 Points)                 [ ] Acute spinal cord injury (in the previous month)  (5 Points)  [ ] Prothrombin 59896 A                                     (3 Points)                 [ ] Paralysis  (less than 1 month)                             (5 Points)  [ ] Factor V Leiden                                             (3 Points)                  [ ] Multiple Trauma within 1 month                        (5 Points)  [ ] Lupus anticoagulants                                     (3 Points)                                                           [ ] Anticardiolipin antibodies                               (3 Points)                                                       [ ] High homocysteine in the blood                      (3 Points)                                             [ ] Other congenital or acquired thrombophilia      (3 Points)                                                [ ] Heparin induced thrombocytopenia                  (3 Points)                                          Total Score [     3     ]

## 2018-09-13 NOTE — H&P PST ADULT - PMH
Acid reflux    Herniated nucleus pulposus, L4-5  H/O microdiscectomy  11/2016  Irregular menstruation    Lumbar herniated disc    Paresthesia of right leg    Thyroid cancer  s/p partial thyroidectomy 2017

## 2018-09-13 NOTE — H&P PST ADULT - NEUROLOGICAL DETAILS
responds to pain/normal strength/alert and oriented x 3/responds to verbal commands/no spontaneous movement

## 2018-09-13 NOTE — H&P PST ADULT - HISTORY OF PRESENT ILLNESS
33 years old female H/O Irregular Menstruation, Acid Reflux Thyroid CA s/p partial thyroidectomy 2017, Herniated nucleus pulposus s/p microdiscectomy (11/2016)   C/O lumbar pain shooting down to RLE started about 2 month ago with numbness and tingling to Right foot now scheduled for L4-L5 Posterior Lumbar Interbody Fusion on 9/20/18.  She curently denies gait instability, loss of bowel or bladder function.

## 2018-09-19 ENCOUNTER — TRANSCRIPTION ENCOUNTER (OUTPATIENT)
Age: 33
End: 2018-09-19

## 2018-09-20 ENCOUNTER — APPOINTMENT (OUTPATIENT)
Dept: SPINE | Facility: HOSPITAL | Age: 33
End: 2018-09-20
Payer: MEDICAID

## 2018-09-20 ENCOUNTER — INPATIENT (INPATIENT)
Facility: HOSPITAL | Age: 33
LOS: 4 days | Discharge: HOME | DRG: 460 | End: 2018-09-25
Attending: NEUROLOGICAL SURGERY | Admitting: NEUROLOGICAL SURGERY
Payer: MEDICAID

## 2018-09-20 VITALS
SYSTOLIC BLOOD PRESSURE: 111 MMHG | RESPIRATION RATE: 16 BRPM | HEIGHT: 65 IN | WEIGHT: 214.07 LBS | DIASTOLIC BLOOD PRESSURE: 74 MMHG | HEART RATE: 53 BPM | TEMPERATURE: 99 F | OXYGEN SATURATION: 99 %

## 2018-09-20 DIAGNOSIS — Z98.890 OTHER SPECIFIED POSTPROCEDURAL STATES: Chronic | ICD-10-CM

## 2018-09-20 DIAGNOSIS — E89.0 POSTPROCEDURAL HYPOTHYROIDISM: Chronic | ICD-10-CM

## 2018-09-20 DIAGNOSIS — M51.26 OTHER INTERVERTEBRAL DISC DISPLACEMENT, LUMBAR REGION: ICD-10-CM

## 2018-09-20 LAB
ANION GAP SERPL CALC-SCNC: 10 MMOL/L — SIGNIFICANT CHANGE UP (ref 5–17)
ANION GAP SERPL CALC-SCNC: 12 MMOL/L — SIGNIFICANT CHANGE UP (ref 5–17)
BASOPHILS # BLD AUTO: 0 K/UL — SIGNIFICANT CHANGE UP (ref 0–0.2)
BASOPHILS NFR BLD AUTO: 0.1 % — SIGNIFICANT CHANGE UP (ref 0–2)
BUN SERPL-MCNC: 9 MG/DL — SIGNIFICANT CHANGE UP (ref 7–23)
BUN SERPL-MCNC: 9 MG/DL — SIGNIFICANT CHANGE UP (ref 7–23)
CALCIUM SERPL-MCNC: 8 MG/DL — LOW (ref 8.4–10.5)
CALCIUM SERPL-MCNC: 8.1 MG/DL — LOW (ref 8.4–10.5)
CHLORIDE SERPL-SCNC: 102 MMOL/L — SIGNIFICANT CHANGE UP (ref 96–108)
CHLORIDE SERPL-SCNC: 102 MMOL/L — SIGNIFICANT CHANGE UP (ref 96–108)
CO2 SERPL-SCNC: 24 MMOL/L — SIGNIFICANT CHANGE UP (ref 22–31)
CO2 SERPL-SCNC: 25 MMOL/L — SIGNIFICANT CHANGE UP (ref 22–31)
CREAT SERPL-MCNC: 0.68 MG/DL — SIGNIFICANT CHANGE UP (ref 0.5–1.3)
CREAT SERPL-MCNC: 0.69 MG/DL — SIGNIFICANT CHANGE UP (ref 0.5–1.3)
EOSINOPHIL # BLD AUTO: 0.1 K/UL — SIGNIFICANT CHANGE UP (ref 0–0.5)
EOSINOPHIL NFR BLD AUTO: 0.5 % — SIGNIFICANT CHANGE UP (ref 0–6)
GLUCOSE BLDC GLUCOMTR-MCNC: 127 MG/DL — HIGH (ref 70–99)
GLUCOSE BLDC GLUCOMTR-MCNC: 94 MG/DL — SIGNIFICANT CHANGE UP (ref 70–99)
GLUCOSE SERPL-MCNC: 110 MG/DL — HIGH (ref 70–99)
GLUCOSE SERPL-MCNC: 99 MG/DL — SIGNIFICANT CHANGE UP (ref 70–99)
HCG UR QL: NEGATIVE — SIGNIFICANT CHANGE UP
HCT VFR BLD CALC: 36.5 % — SIGNIFICANT CHANGE UP (ref 34.5–45)
HGB BLD-MCNC: 12 G/DL — SIGNIFICANT CHANGE UP (ref 11.5–15.5)
LYMPHOCYTES # BLD AUTO: 2.7 K/UL — SIGNIFICANT CHANGE UP (ref 1–3.3)
LYMPHOCYTES # BLD AUTO: 22.6 % — SIGNIFICANT CHANGE UP (ref 13–44)
MAGNESIUM SERPL-MCNC: 1.8 MG/DL — SIGNIFICANT CHANGE UP (ref 1.6–2.6)
MCHC RBC-ENTMCNC: 28.5 PG — SIGNIFICANT CHANGE UP (ref 27–34)
MCHC RBC-ENTMCNC: 32.9 GM/DL — SIGNIFICANT CHANGE UP (ref 32–36)
MCV RBC AUTO: 86.6 FL — SIGNIFICANT CHANGE UP (ref 80–100)
MONOCYTES # BLD AUTO: 0.6 K/UL — SIGNIFICANT CHANGE UP (ref 0–0.9)
MONOCYTES NFR BLD AUTO: 4.9 % — SIGNIFICANT CHANGE UP (ref 2–14)
NEUTROPHILS # BLD AUTO: 8.7 K/UL — HIGH (ref 1.8–7.4)
NEUTROPHILS NFR BLD AUTO: 71.8 % — SIGNIFICANT CHANGE UP (ref 43–77)
PLATELET # BLD AUTO: 223 K/UL — SIGNIFICANT CHANGE UP (ref 150–400)
POTASSIUM SERPL-MCNC: 2.9 MMOL/L — CRITICAL LOW (ref 3.5–5.3)
POTASSIUM SERPL-MCNC: 3.3 MMOL/L — LOW (ref 3.5–5.3)
POTASSIUM SERPL-SCNC: 2.9 MMOL/L — CRITICAL LOW (ref 3.5–5.3)
POTASSIUM SERPL-SCNC: 3.3 MMOL/L — LOW (ref 3.5–5.3)
RBC # BLD: 4.22 M/UL — SIGNIFICANT CHANGE UP (ref 3.8–5.2)
RBC # FLD: 12.2 % — SIGNIFICANT CHANGE UP (ref 10.3–14.5)
SODIUM SERPL-SCNC: 137 MMOL/L — SIGNIFICANT CHANGE UP (ref 135–145)
SODIUM SERPL-SCNC: 138 MMOL/L — SIGNIFICANT CHANGE UP (ref 135–145)
WBC # BLD: 12.1 K/UL — HIGH (ref 3.8–10.5)
WBC # FLD AUTO: 12.1 K/UL — HIGH (ref 3.8–10.5)

## 2018-09-20 PROCEDURE — 22840 INSERT SPINE FIXATION DEVICE: CPT

## 2018-09-20 PROCEDURE — 22633 ARTHRD CMBN 1NTRSPC LUMBAR: CPT

## 2018-09-20 PROCEDURE — 22853 INSJ BIOMECHANICAL DEVICE: CPT

## 2018-09-20 PROCEDURE — 63047 LAM FACETEC & FORAMOT LUMBAR: CPT | Mod: 59

## 2018-09-20 RX ORDER — METHOCARBAMOL 500 MG/1
500 TABLET, FILM COATED ORAL EVERY 6 HOURS
Qty: 0 | Refills: 0 | Status: DISCONTINUED | OUTPATIENT
Start: 2018-09-20 | End: 2018-09-24

## 2018-09-20 RX ORDER — GLUCAGON INJECTION, SOLUTION 0.5 MG/.1ML
1 INJECTION, SOLUTION SUBCUTANEOUS ONCE
Qty: 0 | Refills: 0 | Status: DISCONTINUED | OUTPATIENT
Start: 2018-09-20 | End: 2018-09-23

## 2018-09-20 RX ORDER — ENOXAPARIN SODIUM 100 MG/ML
40 INJECTION SUBCUTANEOUS AT BEDTIME
Qty: 0 | Refills: 0 | Status: DISCONTINUED | OUTPATIENT
Start: 2018-09-21 | End: 2018-09-25

## 2018-09-20 RX ORDER — POTASSIUM CHLORIDE 20 MEQ
20 PACKET (EA) ORAL
Qty: 0 | Refills: 0 | Status: COMPLETED | OUTPATIENT
Start: 2018-09-20 | End: 2018-09-20

## 2018-09-20 RX ORDER — DEXTROSE 50 % IN WATER 50 %
12.5 SYRINGE (ML) INTRAVENOUS ONCE
Qty: 0 | Refills: 0 | Status: DISCONTINUED | OUTPATIENT
Start: 2018-09-20 | End: 2018-09-23

## 2018-09-20 RX ORDER — HYDROMORPHONE HYDROCHLORIDE 2 MG/ML
30 INJECTION INTRAMUSCULAR; INTRAVENOUS; SUBCUTANEOUS
Qty: 0 | Refills: 0 | Status: DISCONTINUED | OUTPATIENT
Start: 2018-09-20 | End: 2018-09-21

## 2018-09-20 RX ORDER — ASCORBIC ACID 60 MG
500 TABLET,CHEWABLE ORAL
Qty: 0 | Refills: 0 | Status: COMPLETED | OUTPATIENT
Start: 2018-09-20 | End: 2018-09-23

## 2018-09-20 RX ORDER — HYDROMORPHONE HYDROCHLORIDE 2 MG/ML
0.5 INJECTION INTRAMUSCULAR; INTRAVENOUS; SUBCUTANEOUS
Qty: 0 | Refills: 0 | Status: DISCONTINUED | OUTPATIENT
Start: 2018-09-20 | End: 2018-09-22

## 2018-09-20 RX ORDER — DEXTROSE 50 % IN WATER 50 %
25 SYRINGE (ML) INTRAVENOUS ONCE
Qty: 0 | Refills: 0 | Status: DISCONTINUED | OUTPATIENT
Start: 2018-09-20 | End: 2018-09-23

## 2018-09-20 RX ORDER — INSULIN LISPRO 100/ML
VIAL (ML) SUBCUTANEOUS AT BEDTIME
Qty: 0 | Refills: 0 | Status: DISCONTINUED | OUTPATIENT
Start: 2018-09-20 | End: 2018-09-23

## 2018-09-20 RX ORDER — HYDROMORPHONE HYDROCHLORIDE 2 MG/ML
0.5 INJECTION INTRAMUSCULAR; INTRAVENOUS; SUBCUTANEOUS THREE TIMES A DAY
Qty: 0 | Refills: 0 | Status: DISCONTINUED | OUTPATIENT
Start: 2018-09-20 | End: 2018-09-22

## 2018-09-20 RX ORDER — ONDANSETRON 8 MG/1
4 TABLET, FILM COATED ORAL EVERY 6 HOURS
Qty: 0 | Refills: 0 | Status: DISCONTINUED | OUTPATIENT
Start: 2018-09-20 | End: 2018-09-25

## 2018-09-20 RX ORDER — DIPHENHYDRAMINE HCL 50 MG
12.5 CAPSULE ORAL EVERY 4 HOURS
Qty: 0 | Refills: 0 | Status: DISCONTINUED | OUTPATIENT
Start: 2018-09-20 | End: 2018-09-20

## 2018-09-20 RX ORDER — ACETAMINOPHEN 500 MG
1000 TABLET ORAL ONCE
Qty: 0 | Refills: 0 | Status: COMPLETED | OUTPATIENT
Start: 2018-09-20 | End: 2018-09-21

## 2018-09-20 RX ORDER — FAMOTIDINE 10 MG/ML
20 INJECTION INTRAVENOUS
Qty: 0 | Refills: 0 | Status: DISCONTINUED | OUTPATIENT
Start: 2018-09-20 | End: 2018-09-23

## 2018-09-20 RX ORDER — ACETAMINOPHEN 500 MG
1000 TABLET ORAL ONCE
Qty: 0 | Refills: 0 | Status: COMPLETED | OUTPATIENT
Start: 2018-09-20 | End: 2018-09-20

## 2018-09-20 RX ORDER — DEXAMETHASONE 0.5 MG/5ML
4 ELIXIR ORAL EVERY 6 HOURS
Qty: 0 | Refills: 0 | Status: COMPLETED | OUTPATIENT
Start: 2018-09-20 | End: 2018-09-21

## 2018-09-20 RX ORDER — DOCUSATE SODIUM 100 MG
100 CAPSULE ORAL THREE TIMES A DAY
Qty: 0 | Refills: 0 | Status: DISCONTINUED | OUTPATIENT
Start: 2018-09-20 | End: 2018-09-25

## 2018-09-20 RX ORDER — DEXTROSE 50 % IN WATER 50 %
25 SYRINGE (ML) INTRAVENOUS ONCE
Qty: 0 | Refills: 0 | Status: DISCONTINUED | OUTPATIENT
Start: 2018-09-20 | End: 2018-09-25

## 2018-09-20 RX ORDER — BENZOCAINE AND MENTHOL 5; 1 G/100ML; G/100ML
1 LIQUID ORAL EVERY 6 HOURS
Qty: 0 | Refills: 0 | Status: DISCONTINUED | OUTPATIENT
Start: 2018-09-20 | End: 2018-09-25

## 2018-09-20 RX ORDER — GABAPENTIN 400 MG/1
300 CAPSULE ORAL
Qty: 0 | Refills: 0 | Status: DISCONTINUED | OUTPATIENT
Start: 2018-09-20 | End: 2018-09-23

## 2018-09-20 RX ORDER — POTASSIUM CHLORIDE 20 MEQ
40 PACKET (EA) ORAL EVERY 4 HOURS
Qty: 0 | Refills: 0 | Status: DISCONTINUED | OUTPATIENT
Start: 2018-09-20 | End: 2018-09-20

## 2018-09-20 RX ORDER — DEXTROSE MONOHYDRATE, SODIUM CHLORIDE, AND POTASSIUM CHLORIDE 50; .745; 4.5 G/1000ML; G/1000ML; G/1000ML
1000 INJECTION, SOLUTION INTRAVENOUS
Qty: 0 | Refills: 0 | Status: DISCONTINUED | OUTPATIENT
Start: 2018-09-20 | End: 2018-09-22

## 2018-09-20 RX ORDER — ACETAMINOPHEN 500 MG
650 TABLET ORAL EVERY 6 HOURS
Qty: 0 | Refills: 0 | Status: DISCONTINUED | OUTPATIENT
Start: 2018-09-20 | End: 2018-09-25

## 2018-09-20 RX ORDER — HYDROMORPHONE HYDROCHLORIDE 2 MG/ML
30 INJECTION INTRAMUSCULAR; INTRAVENOUS; SUBCUTANEOUS
Qty: 0 | Refills: 0 | Status: DISCONTINUED | OUTPATIENT
Start: 2018-09-20 | End: 2018-09-20

## 2018-09-20 RX ORDER — INSULIN LISPRO 100/ML
VIAL (ML) SUBCUTANEOUS
Qty: 0 | Refills: 0 | Status: DISCONTINUED | OUTPATIENT
Start: 2018-09-20 | End: 2018-09-23

## 2018-09-20 RX ORDER — SODIUM CHLORIDE 9 MG/ML
1000 INJECTION, SOLUTION INTRAVENOUS
Qty: 0 | Refills: 0 | Status: DISCONTINUED | OUTPATIENT
Start: 2018-09-20 | End: 2018-09-23

## 2018-09-20 RX ORDER — SENNA PLUS 8.6 MG/1
2 TABLET ORAL AT BEDTIME
Qty: 0 | Refills: 0 | Status: DISCONTINUED | OUTPATIENT
Start: 2018-09-20 | End: 2018-09-25

## 2018-09-20 RX ORDER — DEXTROSE 50 % IN WATER 50 %
15 SYRINGE (ML) INTRAVENOUS ONCE
Qty: 0 | Refills: 0 | Status: DISCONTINUED | OUTPATIENT
Start: 2018-09-20 | End: 2018-09-23

## 2018-09-20 RX ORDER — CEFAZOLIN SODIUM 1 G
2000 VIAL (EA) INJECTION EVERY 8 HOURS
Qty: 0 | Refills: 0 | Status: COMPLETED | OUTPATIENT
Start: 2018-09-20 | End: 2018-09-21

## 2018-09-20 RX ADMIN — Medication 100 MILLIGRAM(S): at 17:32

## 2018-09-20 RX ADMIN — Medication 20 MILLIEQUIVALENT(S): at 20:25

## 2018-09-20 RX ADMIN — SENNA PLUS 2 TABLET(S): 8.6 TABLET ORAL at 22:15

## 2018-09-20 RX ADMIN — Medication 400 MILLIGRAM(S): at 16:38

## 2018-09-20 RX ADMIN — HYDROMORPHONE HYDROCHLORIDE 0.5 MILLIGRAM(S): 2 INJECTION INTRAMUSCULAR; INTRAVENOUS; SUBCUTANEOUS at 13:20

## 2018-09-20 RX ADMIN — Medication 100 MILLIGRAM(S): at 22:16

## 2018-09-20 RX ADMIN — HYDROMORPHONE HYDROCHLORIDE 30 MILLILITER(S): 2 INJECTION INTRAMUSCULAR; INTRAVENOUS; SUBCUTANEOUS at 14:29

## 2018-09-20 RX ADMIN — HYDROMORPHONE HYDROCHLORIDE 0.5 MILLIGRAM(S): 2 INJECTION INTRAMUSCULAR; INTRAVENOUS; SUBCUTANEOUS at 13:51

## 2018-09-20 RX ADMIN — ONDANSETRON 4 MILLIGRAM(S): 8 TABLET, FILM COATED ORAL at 18:31

## 2018-09-20 RX ADMIN — DEXTROSE MONOHYDRATE, SODIUM CHLORIDE, AND POTASSIUM CHLORIDE 110 MILLILITER(S): 50; .745; 4.5 INJECTION, SOLUTION INTRAVENOUS at 14:43

## 2018-09-20 RX ADMIN — HYDROMORPHONE HYDROCHLORIDE 30 MILLILITER(S): 2 INJECTION INTRAMUSCULAR; INTRAVENOUS; SUBCUTANEOUS at 19:25

## 2018-09-20 RX ADMIN — HYDROMORPHONE HYDROCHLORIDE 30 MILLILITER(S): 2 INJECTION INTRAMUSCULAR; INTRAVENOUS; SUBCUTANEOUS at 22:18

## 2018-09-20 RX ADMIN — GABAPENTIN 300 MILLIGRAM(S): 400 CAPSULE ORAL at 17:32

## 2018-09-20 RX ADMIN — HYDROMORPHONE HYDROCHLORIDE 30 MILLILITER(S): 2 INJECTION INTRAMUSCULAR; INTRAVENOUS; SUBCUTANEOUS at 18:46

## 2018-09-20 RX ADMIN — HYDROMORPHONE HYDROCHLORIDE 0.5 MILLIGRAM(S): 2 INJECTION INTRAMUSCULAR; INTRAVENOUS; SUBCUTANEOUS at 13:50

## 2018-09-20 RX ADMIN — BENZOCAINE AND MENTHOL 1 LOZENGE: 5; 1 LIQUID ORAL at 17:32

## 2018-09-20 RX ADMIN — Medication 4 MILLIGRAM(S): at 17:32

## 2018-09-20 RX ADMIN — SODIUM CHLORIDE 3 MILLILITER(S): 9 INJECTION INTRAMUSCULAR; INTRAVENOUS; SUBCUTANEOUS at 06:36

## 2018-09-20 RX ADMIN — HYDROMORPHONE HYDROCHLORIDE 0.5 MILLIGRAM(S): 2 INJECTION INTRAMUSCULAR; INTRAVENOUS; SUBCUTANEOUS at 15:03

## 2018-09-20 RX ADMIN — HYDROMORPHONE HYDROCHLORIDE 0.5 MILLIGRAM(S): 2 INJECTION INTRAMUSCULAR; INTRAVENOUS; SUBCUTANEOUS at 13:21

## 2018-09-20 RX ADMIN — Medication 20 MILLIEQUIVALENT(S): at 22:16

## 2018-09-20 RX ADMIN — HYDROMORPHONE HYDROCHLORIDE 0.5 MILLIGRAM(S): 2 INJECTION INTRAMUSCULAR; INTRAVENOUS; SUBCUTANEOUS at 14:20

## 2018-09-20 RX ADMIN — Medication 500 MILLIGRAM(S): at 20:27

## 2018-09-20 RX ADMIN — HYDROMORPHONE HYDROCHLORIDE 0.5 MILLIGRAM(S): 2 INJECTION INTRAMUSCULAR; INTRAVENOUS; SUBCUTANEOUS at 12:50

## 2018-09-20 NOTE — PROGRESS NOTE ADULT - ASSESSMENT
35-year old woman s/p L4-L5 TLIF w/ intraoperative CSF leak  -Clear for 7 [place]  -Flat for 24 hours (noon tomorrow). Ok to logroll for meals/meds.\  -Cepacol ordered for sore throat.  -UA ordered.  -Potassium repletion. F/U BMP in AM   -Continue PCA  -PT after able to get OOB   -Decadron 4Q6, will taper tomorrow

## 2018-09-20 NOTE — BRIEF OPERATIVE NOTE - PROCEDURE
<<-----Click on this checkbox to enter Procedure Transforaminal lumbar interbody fusion (TLIF)  09/20/2018    Active  [id number]

## 2018-09-20 NOTE — PROGRESS NOTE ADULT - SUBJECTIVE AND OBJECTIVE BOX
SUBJECTIVE:   Patient now s/p L4-L5 TLIF w/ intraoperative CSF leak, primary dural repair   Some back soreness   No leg pain  Significantly decreased numbness/tingling in the legs   C/O abdominal pain similar to what she gets when she has a UTI, asked for UA     OVERNIGHT EVENTS:     Vital Signs Last 24 Hrs  T(C): 36.9 (20 Sep 2018 16:00), Max: 37.1 (20 Sep 2018 06:26)  T(F): 98.4 (20 Sep 2018 16:00), Max: 98.8 (20 Sep 2018 06:26)  HR: 72 (20 Sep 2018 17:00) (53 - 114)  BP: 110/56 (20 Sep 2018 17:00) (93/51 - 137/65)  BP(mean): 75 (20 Sep 2018 17:00) (69 - 102)  RR: 12 (20 Sep 2018 17:00) (11 - 41)  SpO2: 93% (20 Sep 2018 17:00) (93% - 100%)    DRAINS:  Hemovac  [place]    PHYSICAL EXAM:    Constitutional: No Acute Distress     Neurological: AOx3, Following Commands, Moving all Extremities     Motor exam:          Upper extremity                         Delt     Bicep     Tricep    HG                                                 R         5/5        5/5        5/5       5/5                                               L          5/5        5/5        5/5       5/5          Lower extremity                        HF         KF        KE       DF         PF                                                  R        5/5        5/5        5/5       5/5         5/5                                               L         5/5        5/5       5/5       5/5          5/5                                                 Sensation: [x] intact to light touch  [] decreased:     Gastrointestinal: Soft, Non-tender, Non-distended     Extremities: No calf tenderness     Incision: Dressed    LABS:                        12.0   12.1  )-----------( 223      ( 20 Sep 2018 13:02 )             36.5    09-20    137  |  102  |  9   ----------------------------<  99  3.3<L>   |  25  |  0.68    Ca    8.0<L>      20 Sep 2018 15:20  Mg     1.8     09-20        MEDICATIONS:  Antibiotics:  ceFAZolin   IVPB 2000 milliGRAM(s) IV Intermittent every 8 hours  ceFAZolin   IVPB 2000 milliGRAM(s) IV Intermittent once    Neuro:  acetaminophen   Tablet .. 650 milliGRAM(s) Oral every 6 hours PRN Temp greater or equal to 38C (100.4F)  gabapentin 300 milliGRAM(s) Oral two times a day  HYDROmorphone  Injectable 0.5 milliGRAM(s) IV Push every 10 minutes PRN Moderate Pain (4 - 6)  HYDROmorphone  Injectable 0.5 milliGRAM(s) IV Push three times a day PRN Moderate Pain (4 - 6)  HYDROmorphone PCA (1 mG/mL) 30 milliLiter(s) PCA Continuous PCA Continuous  HYDROmorphone PCA (1 mG/mL) Rescue Clinician Bolus 0.5 milliGRAM(s) IV Push every 15 minutes PRN for Pain Scale GREATER THAN 6  methocarbamol 500 milliGRAM(s) Oral every 6 hours PRN Back Spasms  ondansetron Injectable 4 milliGRAM(s) IV Push every 6 hours PRN Nausea    Cardiac:    Pulm:    GI/:  aluminum hydroxide/magnesium hydroxide/simethicone Suspension 30 milliLiter(s) Oral every 12 hours PRN Indigestion  docusate sodium 100 milliGRAM(s) Oral three times a day  senna 2 Tablet(s) Oral at bedtime    Other:   ascorbic acid 500 milliGRAM(s) Oral two times a day  benzocaine 15 mG/menthol 3.6 mG Lozenge 1 Lozenge Oral every 6 hours  dexamethasone  Injectable 4 milliGRAM(s) IV Push every 6 hours  dextrose 40% Gel 15 Gram(s) Oral once PRN Blood Glucose LESS THAN 70 milliGRAM(s)/deciliter  dextrose 5%. 1000 milliLiter(s) IV Continuous <Continuous>  dextrose 50% Injectable 12.5 Gram(s) IV Push once  dextrose 50% Injectable 25 Gram(s) IV Push once  dextrose 50% Injectable 25 Gram(s) IV Push once  glucagon  Injectable 1 milliGRAM(s) IntraMuscular once PRN Glucose LESS THAN 70 milligrams/deciliter  insulin lispro (HumaLOG) corrective regimen sliding scale   SubCutaneous three times a day before meals  insulin lispro (HumaLOG) corrective regimen sliding scale   SubCutaneous at bedtime  potassium chloride    Tablet ER 20 milliEquivalent(s) Oral every 2 hours  sodium chloride 0.9% with potassium chloride 20 mEq/L 1000 milliLiter(s) IV Continuous <Continuous>    DIET: [] Regular [] CCD [] Renal [] Puree [] Dysphagia [] Tube Feeds:     IMAGING:

## 2018-09-20 NOTE — PATIENT PROFILE ADULT. - URINARY CATHETER
Alert-The patient is alert, awake and responds to voice. The patient is oriented to time, place, and person. The triage nurse is able to obtain subjective information.
no

## 2018-09-21 LAB
ANION GAP SERPL CALC-SCNC: 7 MMOL/L — SIGNIFICANT CHANGE UP (ref 5–17)
APPEARANCE UR: CLEAR — SIGNIFICANT CHANGE UP
BASOPHILS # BLD AUTO: 0 K/UL — SIGNIFICANT CHANGE UP (ref 0–0.2)
BASOPHILS NFR BLD AUTO: 0.1 % — SIGNIFICANT CHANGE UP (ref 0–2)
BILIRUB UR-MCNC: NEGATIVE — SIGNIFICANT CHANGE UP
BUN SERPL-MCNC: 6 MG/DL — LOW (ref 7–23)
CALCIUM SERPL-MCNC: 8 MG/DL — LOW (ref 8.4–10.5)
CHLORIDE SERPL-SCNC: 103 MMOL/L — SIGNIFICANT CHANGE UP (ref 96–108)
CO2 SERPL-SCNC: 25 MMOL/L — SIGNIFICANT CHANGE UP (ref 22–31)
COLOR SPEC: SIGNIFICANT CHANGE UP
CREAT SERPL-MCNC: 0.63 MG/DL — SIGNIFICANT CHANGE UP (ref 0.5–1.3)
DIFF PNL FLD: NEGATIVE — SIGNIFICANT CHANGE UP
EOSINOPHIL # BLD AUTO: 0 K/UL — SIGNIFICANT CHANGE UP (ref 0–0.5)
EOSINOPHIL NFR BLD AUTO: 0.1 % — SIGNIFICANT CHANGE UP (ref 0–6)
GLUCOSE BLDC GLUCOMTR-MCNC: 121 MG/DL — HIGH (ref 70–99)
GLUCOSE BLDC GLUCOMTR-MCNC: 125 MG/DL — HIGH (ref 70–99)
GLUCOSE BLDC GLUCOMTR-MCNC: 128 MG/DL — HIGH (ref 70–99)
GLUCOSE BLDC GLUCOMTR-MCNC: 141 MG/DL — HIGH (ref 70–99)
GLUCOSE SERPL-MCNC: 136 MG/DL — HIGH (ref 70–99)
GLUCOSE UR QL: NEGATIVE — SIGNIFICANT CHANGE UP
HBA1C BLD-MCNC: 5.3 % — SIGNIFICANT CHANGE UP (ref 4–5.6)
HCT VFR BLD CALC: 33.3 % — LOW (ref 34.5–45)
HGB BLD-MCNC: 11 G/DL — LOW (ref 11.5–15.5)
KETONES UR-MCNC: NEGATIVE — SIGNIFICANT CHANGE UP
LEUKOCYTE ESTERASE UR-ACNC: NEGATIVE — SIGNIFICANT CHANGE UP
LYMPHOCYTES # BLD AUTO: 0.9 K/UL — LOW (ref 1–3.3)
LYMPHOCYTES # BLD AUTO: 9.4 % — LOW (ref 13–44)
MCHC RBC-ENTMCNC: 28.9 PG — SIGNIFICANT CHANGE UP (ref 27–34)
MCHC RBC-ENTMCNC: 33 GM/DL — SIGNIFICANT CHANGE UP (ref 32–36)
MCV RBC AUTO: 87.6 FL — SIGNIFICANT CHANGE UP (ref 80–100)
MONOCYTES # BLD AUTO: 0.1 K/UL — SIGNIFICANT CHANGE UP (ref 0–0.9)
MONOCYTES NFR BLD AUTO: 1.2 % — LOW (ref 2–14)
NEUTROPHILS # BLD AUTO: 8.9 K/UL — HIGH (ref 1.8–7.4)
NEUTROPHILS NFR BLD AUTO: 89.3 % — HIGH (ref 43–77)
NITRITE UR-MCNC: NEGATIVE — SIGNIFICANT CHANGE UP
PH UR: 6.5 — SIGNIFICANT CHANGE UP (ref 5–8)
PLATELET # BLD AUTO: 218 K/UL — SIGNIFICANT CHANGE UP (ref 150–400)
POTASSIUM SERPL-MCNC: 4.4 MMOL/L — SIGNIFICANT CHANGE UP (ref 3.5–5.3)
POTASSIUM SERPL-SCNC: 4.4 MMOL/L — SIGNIFICANT CHANGE UP (ref 3.5–5.3)
PROT UR-MCNC: NEGATIVE — SIGNIFICANT CHANGE UP
RBC # BLD: 3.8 M/UL — SIGNIFICANT CHANGE UP (ref 3.8–5.2)
RBC # FLD: 12.2 % — SIGNIFICANT CHANGE UP (ref 10.3–14.5)
SODIUM SERPL-SCNC: 135 MMOL/L — SIGNIFICANT CHANGE UP (ref 135–145)
SP GR SPEC: 1.01 — SIGNIFICANT CHANGE UP
UROBILINOGEN FLD QL: NEGATIVE — SIGNIFICANT CHANGE UP
WBC # BLD: 10 K/UL — SIGNIFICANT CHANGE UP (ref 3.8–10.5)
WBC # FLD AUTO: 10 K/UL — SIGNIFICANT CHANGE UP (ref 3.8–10.5)

## 2018-09-21 RX ORDER — DIPHENHYDRAMINE HCL 50 MG
25 CAPSULE ORAL ONCE
Qty: 0 | Refills: 0 | Status: COMPLETED | OUTPATIENT
Start: 2018-09-21 | End: 2018-09-21

## 2018-09-21 RX ORDER — ACETAMINOPHEN 500 MG
1000 TABLET ORAL ONCE
Qty: 0 | Refills: 0 | Status: COMPLETED | OUTPATIENT
Start: 2018-09-21 | End: 2018-09-21

## 2018-09-21 RX ORDER — METOCLOPRAMIDE HCL 10 MG
10 TABLET ORAL ONCE
Qty: 0 | Refills: 0 | Status: COMPLETED | OUTPATIENT
Start: 2018-09-21 | End: 2018-09-21

## 2018-09-21 RX ORDER — INFLUENZA VIRUS VACCINE 15; 15; 15; 15 UG/.5ML; UG/.5ML; UG/.5ML; UG/.5ML
0.5 SUSPENSION INTRAMUSCULAR ONCE
Qty: 0 | Refills: 0 | Status: DISCONTINUED | OUTPATIENT
Start: 2018-09-21 | End: 2018-09-25

## 2018-09-21 RX ORDER — HYDROMORPHONE HYDROCHLORIDE 2 MG/ML
30 INJECTION INTRAMUSCULAR; INTRAVENOUS; SUBCUTANEOUS
Qty: 0 | Refills: 0 | Status: DISCONTINUED | OUTPATIENT
Start: 2018-09-21 | End: 2018-09-22

## 2018-09-21 RX ADMIN — DEXTROSE MONOHYDRATE, SODIUM CHLORIDE, AND POTASSIUM CHLORIDE 110 MILLILITER(S): 50; .745; 4.5 INJECTION, SOLUTION INTRAVENOUS at 10:41

## 2018-09-21 RX ADMIN — Medication 25 MILLIGRAM(S): at 19:51

## 2018-09-21 RX ADMIN — HYDROMORPHONE HYDROCHLORIDE 30 MILLILITER(S): 2 INJECTION INTRAMUSCULAR; INTRAVENOUS; SUBCUTANEOUS at 18:24

## 2018-09-21 RX ADMIN — HYDROMORPHONE HYDROCHLORIDE 30 MILLILITER(S): 2 INJECTION INTRAMUSCULAR; INTRAVENOUS; SUBCUTANEOUS at 21:25

## 2018-09-21 RX ADMIN — SENNA PLUS 2 TABLET(S): 8.6 TABLET ORAL at 21:27

## 2018-09-21 RX ADMIN — HYDROMORPHONE HYDROCHLORIDE 30 MILLILITER(S): 2 INJECTION INTRAMUSCULAR; INTRAVENOUS; SUBCUTANEOUS at 07:22

## 2018-09-21 RX ADMIN — Medication 10 MILLIGRAM(S): at 10:01

## 2018-09-21 RX ADMIN — BENZOCAINE AND MENTHOL 1 LOZENGE: 5; 1 LIQUID ORAL at 05:37

## 2018-09-21 RX ADMIN — Medication 1000 MILLIGRAM(S): at 22:00

## 2018-09-21 RX ADMIN — HYDROMORPHONE HYDROCHLORIDE 30 MILLILITER(S): 2 INJECTION INTRAMUSCULAR; INTRAVENOUS; SUBCUTANEOUS at 01:10

## 2018-09-21 RX ADMIN — HYDROMORPHONE HYDROCHLORIDE 30 MILLILITER(S): 2 INJECTION INTRAMUSCULAR; INTRAVENOUS; SUBCUTANEOUS at 14:03

## 2018-09-21 RX ADMIN — Medication 500 MILLIGRAM(S): at 18:16

## 2018-09-21 RX ADMIN — HYDROMORPHONE HYDROCHLORIDE 30 MILLILITER(S): 2 INJECTION INTRAMUSCULAR; INTRAVENOUS; SUBCUTANEOUS at 05:39

## 2018-09-21 RX ADMIN — Medication 1000 MILLIGRAM(S): at 02:00

## 2018-09-21 RX ADMIN — ONDANSETRON 4 MILLIGRAM(S): 8 TABLET, FILM COATED ORAL at 03:16

## 2018-09-21 RX ADMIN — Medication 400 MILLIGRAM(S): at 21:27

## 2018-09-21 RX ADMIN — Medication 100 MILLIGRAM(S): at 05:37

## 2018-09-21 RX ADMIN — Medication 100 MILLIGRAM(S): at 01:04

## 2018-09-21 RX ADMIN — Medication 400 MILLIGRAM(S): at 10:40

## 2018-09-21 RX ADMIN — Medication 100 MILLIGRAM(S): at 21:27

## 2018-09-21 RX ADMIN — HYDROMORPHONE HYDROCHLORIDE 30 MILLILITER(S): 2 INJECTION INTRAMUSCULAR; INTRAVENOUS; SUBCUTANEOUS at 19:52

## 2018-09-21 RX ADMIN — Medication 100 MILLIGRAM(S): at 14:10

## 2018-09-21 RX ADMIN — Medication 500 MILLIGRAM(S): at 05:37

## 2018-09-21 RX ADMIN — ONDANSETRON 4 MILLIGRAM(S): 8 TABLET, FILM COATED ORAL at 14:48

## 2018-09-21 RX ADMIN — HYDROMORPHONE HYDROCHLORIDE 30 MILLILITER(S): 2 INJECTION INTRAMUSCULAR; INTRAVENOUS; SUBCUTANEOUS at 11:55

## 2018-09-21 RX ADMIN — Medication 4 MILLIGRAM(S): at 05:37

## 2018-09-21 RX ADMIN — ENOXAPARIN SODIUM 40 MILLIGRAM(S): 100 INJECTION SUBCUTANEOUS at 21:27

## 2018-09-21 RX ADMIN — HYDROMORPHONE HYDROCHLORIDE 30 MILLILITER(S): 2 INJECTION INTRAMUSCULAR; INTRAVENOUS; SUBCUTANEOUS at 10:42

## 2018-09-21 RX ADMIN — GABAPENTIN 300 MILLIGRAM(S): 400 CAPSULE ORAL at 18:16

## 2018-09-21 RX ADMIN — FAMOTIDINE 20 MILLIGRAM(S): 10 INJECTION INTRAVENOUS at 05:37

## 2018-09-21 RX ADMIN — Medication 4 MILLIGRAM(S): at 11:54

## 2018-09-21 RX ADMIN — Medication 400 MILLIGRAM(S): at 01:04

## 2018-09-21 RX ADMIN — Medication 4 MILLIGRAM(S): at 01:03

## 2018-09-21 RX ADMIN — FAMOTIDINE 20 MILLIGRAM(S): 10 INJECTION INTRAVENOUS at 18:16

## 2018-09-21 RX ADMIN — GABAPENTIN 300 MILLIGRAM(S): 400 CAPSULE ORAL at 05:37

## 2018-09-21 RX ADMIN — HYDROMORPHONE HYDROCHLORIDE 30 MILLILITER(S): 2 INJECTION INTRAMUSCULAR; INTRAVENOUS; SUBCUTANEOUS at 03:11

## 2018-09-21 NOTE — PROGRESS NOTE ADULT - ASSESSMENT
HPI:  Patient is a 33 year old female with low back pain that radiated to the RLE.  Now presented for surgery.    PROCEDURE: s/p right L4-L5 transforaminal lumbar interbody fusion on 9/20/2018   POD#1    PLAN:  -dilaudid pca for pain control  -robaxin for muscle spasms  -flat in bed until 12pm today  -continue elder while flat in bed, trial of void once out of bed  -decadron 4q6 for 4 doses total  -senna and colace for bowel regimen  -lovenox and SCDs for DVT prophylaxis  -regular diet  -out of bed with assistance  -incentive spirometer for lung expansion  -pt eval pending  -am labs    Spectra #[id number]

## 2018-09-21 NOTE — PHYSICAL THERAPY INITIAL EVALUATION ADULT - PLANNED THERAPY INTERVENTIONS, PT EVAL
bed mobility training/GOAL: Pt will be able to Negotiate up/down _ steps, w/ _ assist, w/ _ rails/and appropriate assistive device, w/reciprocal/step-to gait pattern, in 2 weeks./strengthening/gait training/transfer training

## 2018-09-21 NOTE — PHYSICAL THERAPY INITIAL EVALUATION ADULT - ADDITIONAL COMMENTS
Prior to admission pt reports being independent of all ADL's & functional mobility. Pt resides in house with mother & sister, with 10 stairs to enter going down into basement with a rail on each side. Pt reports that she will be staying at her brothers house when D/C who has 4 steps to enter home with rail, & 12 stairs to go to the second floor where she will be staying.

## 2018-09-21 NOTE — PROGRESS NOTE ADULT - SUBJECTIVE AND OBJECTIVE BOX
Day 1 of Anesthesia Pain Management Service    SUBJECTIVE: Patient is doing well with IV PCA    Pain Scale Score:	[X] Refer to charted pain scores    THERAPY:    [ ] IV PCA Morphine		[ ] 5 mg/mL	[ ] 1 mg/mL  [X] IV PCA Hydromorphone	[ ] 5 mg/mL	[X] 1 mg/mL  [ ] IV PCA Fentanyl		[ ] 50 micrograms/mL    Demand dose: 0.25 mg     Lockout: 6 minutes   Continuous Rate: 0 mg/hr  4 Hour Limit: 4 mg    MEDICATIONS  (STANDING):  ascorbic acid 500 milliGRAM(s) Oral two times a day  benzocaine 15 mG/menthol 3.6 mG Lozenge 1 Lozenge Oral every 6 hours  ceFAZolin   IVPB 2000 milliGRAM(s) IV Intermittent once  dexamethasone  Injectable 4 milliGRAM(s) IV Push every 6 hours  dextrose 5%. 1000 milliLiter(s) (50 mL/Hr) IV Continuous <Continuous>  dextrose 50% Injectable 12.5 Gram(s) IV Push once  dextrose 50% Injectable 25 Gram(s) IV Push once  dextrose 50% Injectable 25 Gram(s) IV Push once  docusate sodium 100 milliGRAM(s) Oral three times a day  enoxaparin Injectable 40 milliGRAM(s) SubCutaneous at bedtime  famotidine    Tablet 20 milliGRAM(s) Oral two times a day  gabapentin 300 milliGRAM(s) Oral two times a day  HYDROmorphone PCA (1 mG/mL) 30 milliLiter(s) PCA Continuous PCA Continuous  influenza   Vaccine 0.5 milliLiter(s) IntraMuscular once  insulin lispro (HumaLOG) corrective regimen sliding scale   SubCutaneous three times a day before meals  insulin lispro (HumaLOG) corrective regimen sliding scale   SubCutaneous at bedtime  senna 2 Tablet(s) Oral at bedtime  sodium chloride 0.9% with potassium chloride 20 mEq/L 1000 milliLiter(s) (110 mL/Hr) IV Continuous <Continuous>    MEDICATIONS  (PRN):  acetaminophen   Tablet .. 650 milliGRAM(s) Oral every 6 hours PRN Temp greater or equal to 38C (100.4F)  aluminum hydroxide/magnesium hydroxide/simethicone Suspension 30 milliLiter(s) Oral every 12 hours PRN Indigestion  dextrose 40% Gel 15 Gram(s) Oral once PRN Blood Glucose LESS THAN 70 milliGRAM(s)/deciliter  glucagon  Injectable 1 milliGRAM(s) IntraMuscular once PRN Glucose LESS THAN 70 milligrams/deciliter  HYDROmorphone  Injectable 0.5 milliGRAM(s) IV Push every 10 minutes PRN Moderate Pain (4 - 6)  HYDROmorphone  Injectable 0.5 milliGRAM(s) IV Push three times a day PRN Moderate Pain (4 - 6)  HYDROmorphone PCA (1 mG/mL) Rescue Clinician Bolus 0.5 milliGRAM(s) IV Push every 15 minutes PRN for Pain Scale GREATER THAN 6  methocarbamol 500 milliGRAM(s) Oral every 6 hours PRN Back Spasms  ondansetron Injectable 4 milliGRAM(s) IV Push every 6 hours PRN Nausea      OBJECTIVE:    Sedation Score:	[ X] Alert	[ ] Drowsy 	[ ] Arousable	[ ] Asleep	[ ] Unresponsive    Side Effects:	[X ] None	[ ] Nausea	[ ] Vomiting	[ ] Pruritus  		[ ] Other:    Vital Signs Last 24 Hrs  T(C): 37 (21 Sep 2018 09:00), Max: 37.1 (21 Sep 2018 05:33)  T(F): 98.6 (21 Sep 2018 09:00), Max: 98.7 (21 Sep 2018 05:33)  HR: 66 (21 Sep 2018 09:00) (62 - 114)  BP: 91/55 (21 Sep 2018 09:00) (91/55 - 137/65)  BP(mean): 72 (20 Sep 2018 18:30) (69 - 102)  RR: 179 (21 Sep 2018 09:00) (11 - 179)  SpO2: 98% (21 Sep 2018 09:00) (91% - 100%)    ASSESSMENT/ PLAN    Therapy to  be:               [X] Continued   [ ] Discontinued   [ ] Changed to PRN Analgesics    Documentation and Verification of current medications:   [X] Done	[ ] Not done, not eligible    Comments: Using 2-5x/hr. Four hour limit reached. Increase four hour limit to 5mg.

## 2018-09-21 NOTE — PHYSICAL THERAPY INITIAL EVALUATION ADULT - PERTINENT HX OF CURRENT PROBLEM, REHAB EVAL
32 y/o F, c/o LBP that radiated to the RLE. POD#1 right L4-L5 transforaminal lumbar interbody fusion on 9/20.

## 2018-09-21 NOTE — PHYSICAL THERAPY INITIAL EVALUATION ADULT - GENERAL OBSERVATIONS, REHAB EVAL
Pt received supine in bed, +2L O2 via NC, +elder, +PCA, +IVL, +bilateral venodynes, +HMV, A&Ox4, agreeable to physical therapy edmond bailey 45 min.

## 2018-09-21 NOTE — PROGRESS NOTE ADULT - SUBJECTIVE AND OBJECTIVE BOX
Pain Management Attending Addendum    SUBJECTIVE: Patient doing well with IV PCA    Therapy:    [X] IV PCA         [ ] PRN Analgesics    OBJECTIVE:   [X] Pain appropriately controlled    [ ] Other:    Side Effects:  [X] None	             [ ] Nausea              [ ] Pruritis                	[ ] Other:    ASSESSMENT/PLAN: Continue current therapy increase 4 hr limit    Comments:

## 2018-09-21 NOTE — PROGRESS NOTE ADULT - SUBJECTIVE AND OBJECTIVE BOX
HPI:  Patient is a 33 year old female with low back pain that radiated to the RLE.  Now presented for surgery.    OVERNIGHT EVENTS:  No acute events overnight.  Patient has been flat in bed for intraop csf leak.  Is having incisional pain which is currently controlled with dilaudid pca.  Tolerating diet.    Vital Signs Last 24 Hrs  T(C): 37 (21 Sep 2018 09:00), Max: 37.1 (21 Sep 2018 05:33)  T(F): 98.6 (21 Sep 2018 09:00), Max: 98.7 (21 Sep 2018 05:33)  HR: 66 (21 Sep 2018 09:00) (62 - 114)  BP: 91/55 (21 Sep 2018 09:00) (91/55 - 137/65)  BP(mean): 72 (20 Sep 2018 18:30) (69 - 102)  RR: 179 (21 Sep 2018 09:00) (11 - 179)  SpO2: 98% (21 Sep 2018 09:00) (91% - 100%)        PHYSICAL EXAM:  Neurological: awake, alert, oriented x3, follows commands, speech clear and fluent, moves all extremities x4 w/ 5/5 strength throughout, sensation present, intact, equal throughout    Cardiovascular: +s1, s2  Respiratory: clear to auscultation b/l  Gastrointestinal: soft, non-distended, non-tender  Genitourinary: +elder  Incision/Wound: posterior midline vertical incision dressing c/d/i, hemovac x1    TUBES/LINES:  [x] hemovac x1 (125cc serosanguinous since OR)  [x] elder    DIET:  [x] regular    LABS:                        11.0   10.0  )-----------( 218      ( 21 Sep 2018 08:32 )             33.3     09-21    135  |  103  |  6<L>  ----------------------------<  136<H>  4.4   |  25  |  0.63    Ca    8.0<L>      21 Sep 2018 08:24  Mg     1.8     09-20            CAPILLARY BLOOD GLUCOSE      POCT Blood Glucose.: 125 mg/dL (21 Sep 2018 09:25)  POCT Blood Glucose.: 127 mg/dL (20 Sep 2018 22:24)        Allergies    tramadol (Pruritus)  Voltaren (Flushing; Urticaria; Rash; Swelling)  Voltaren (Pruritus; Hives)          MEDICATIONS:  Antibiotics:  ceFAZolin   IVPB 2000 milliGRAM(s) IV Intermittent once    Neuro:  acetaminophen   Tablet .. 650 milliGRAM(s) Oral every 6 hours PRN  gabapentin 300 milliGRAM(s) Oral two times a day  HYDROmorphone  Injectable 0.5 milliGRAM(s) IV Push every 10 minutes PRN  HYDROmorphone  Injectable 0.5 milliGRAM(s) IV Push three times a day PRN  HYDROmorphone PCA (1 mG/mL) 30 milliLiter(s) PCA Continuous PCA Continuous  HYDROmorphone PCA (1 mG/mL) Rescue Clinician Bolus 0.5 milliGRAM(s) IV Push every 15 minutes PRN  methocarbamol 500 milliGRAM(s) Oral every 6 hours PRN  ondansetron Injectable 4 milliGRAM(s) IV Push every 6 hours PRN    Anticoagulation:  enoxaparin Injectable 40 milliGRAM(s) SubCutaneous at bedtime    OTHER:  aluminum hydroxide/magnesium hydroxide/simethicone Suspension 30 milliLiter(s) Oral every 12 hours PRN  benzocaine 15 mG/menthol 3.6 mG Lozenge 1 Lozenge Oral every 6 hours  dexamethasone  Injectable 4 milliGRAM(s) IV Push every 6 hours  dextrose 40% Gel 15 Gram(s) Oral once PRN  dextrose 50% Injectable 12.5 Gram(s) IV Push once  dextrose 50% Injectable 25 Gram(s) IV Push once  dextrose 50% Injectable 25 Gram(s) IV Push once  docusate sodium 100 milliGRAM(s) Oral three times a day  famotidine    Tablet 20 milliGRAM(s) Oral two times a day  glucagon  Injectable 1 milliGRAM(s) IntraMuscular once PRN  influenza   Vaccine 0.5 milliLiter(s) IntraMuscular once  insulin lispro (HumaLOG) corrective regimen sliding scale   SubCutaneous three times a day before meals  insulin lispro (HumaLOG) corrective regimen sliding scale   SubCutaneous at bedtime  senna 2 Tablet(s) Oral at bedtime    IVF:  ascorbic acid 500 milliGRAM(s) Oral two times a day  dextrose 5%. 1000 milliLiter(s) IV Continuous <Continuous>  sodium chloride 0.9% with potassium chloride 20 mEq/L 1000 milliLiter(s) IV Continuous <Continuous>    CULTURES:  none    RADIOLOGY & ADDITIONAL TESTS:  none

## 2018-09-22 LAB
ALBUMIN SERPL ELPH-MCNC: 3.2 G/DL — LOW (ref 3.3–5)
ALP SERPL-CCNC: 46 U/L — SIGNIFICANT CHANGE UP (ref 40–120)
ALT FLD-CCNC: 10 U/L — SIGNIFICANT CHANGE UP (ref 10–45)
ANION GAP SERPL CALC-SCNC: 8 MMOL/L — SIGNIFICANT CHANGE UP (ref 5–17)
AST SERPL-CCNC: 11 U/L — SIGNIFICANT CHANGE UP (ref 10–40)
BILIRUB DIRECT SERPL-MCNC: <0.1 MG/DL — SIGNIFICANT CHANGE UP (ref 0–0.2)
BILIRUB INDIRECT FLD-MCNC: >0 MG/DL — LOW (ref 0.2–1)
BILIRUB SERPL-MCNC: 0.1 MG/DL — LOW (ref 0.2–1.2)
BUN SERPL-MCNC: 7 MG/DL — SIGNIFICANT CHANGE UP (ref 7–23)
CALCIUM SERPL-MCNC: 7.7 MG/DL — LOW (ref 8.4–10.5)
CHLORIDE SERPL-SCNC: 103 MMOL/L — SIGNIFICANT CHANGE UP (ref 96–108)
CO2 SERPL-SCNC: 28 MMOL/L — SIGNIFICANT CHANGE UP (ref 22–31)
CREAT SERPL-MCNC: 0.65 MG/DL — SIGNIFICANT CHANGE UP (ref 0.5–1.3)
GLUCOSE BLDC GLUCOMTR-MCNC: 104 MG/DL — HIGH (ref 70–99)
GLUCOSE BLDC GLUCOMTR-MCNC: 109 MG/DL — HIGH (ref 70–99)
GLUCOSE BLDC GLUCOMTR-MCNC: 113 MG/DL — HIGH (ref 70–99)
GLUCOSE BLDC GLUCOMTR-MCNC: 161 MG/DL — HIGH (ref 70–99)
GLUCOSE SERPL-MCNC: 108 MG/DL — HIGH (ref 70–99)
HCT VFR BLD CALC: 30.4 % — LOW (ref 34.5–45)
HGB BLD-MCNC: 9.8 G/DL — LOW (ref 11.5–15.5)
MCHC RBC-ENTMCNC: 28.7 PG — SIGNIFICANT CHANGE UP (ref 27–34)
MCHC RBC-ENTMCNC: 32.4 GM/DL — SIGNIFICANT CHANGE UP (ref 32–36)
MCV RBC AUTO: 88.5 FL — SIGNIFICANT CHANGE UP (ref 80–100)
PLATELET # BLD AUTO: 204 K/UL — SIGNIFICANT CHANGE UP (ref 150–400)
POTASSIUM SERPL-MCNC: 3.9 MMOL/L — SIGNIFICANT CHANGE UP (ref 3.5–5.3)
POTASSIUM SERPL-SCNC: 3.9 MMOL/L — SIGNIFICANT CHANGE UP (ref 3.5–5.3)
PROT SERPL-MCNC: 5.8 G/DL — LOW (ref 6–8.3)
RBC # BLD: 3.43 M/UL — LOW (ref 3.8–5.2)
RBC # FLD: 12.4 % — SIGNIFICANT CHANGE UP (ref 10.3–14.5)
SODIUM SERPL-SCNC: 139 MMOL/L — SIGNIFICANT CHANGE UP (ref 135–145)
WBC # BLD: 12 K/UL — HIGH (ref 3.8–10.5)
WBC # FLD AUTO: 12 K/UL — HIGH (ref 3.8–10.5)

## 2018-09-22 RX ORDER — HYDROMORPHONE HYDROCHLORIDE 2 MG/ML
2 INJECTION INTRAMUSCULAR; INTRAVENOUS; SUBCUTANEOUS EVERY 4 HOURS
Qty: 0 | Refills: 0 | Status: DISCONTINUED | OUTPATIENT
Start: 2018-09-22 | End: 2018-09-25

## 2018-09-22 RX ORDER — CALCIUM GLUCONATE 100 MG/ML
1 VIAL (ML) INTRAVENOUS ONCE
Qty: 0 | Refills: 0 | Status: COMPLETED | OUTPATIENT
Start: 2018-09-22 | End: 2018-09-22

## 2018-09-22 RX ORDER — DIPHENHYDRAMINE HCL 50 MG
25 CAPSULE ORAL EVERY 8 HOURS
Qty: 0 | Refills: 0 | Status: DISCONTINUED | OUTPATIENT
Start: 2018-09-22 | End: 2018-09-25

## 2018-09-22 RX ORDER — HYDROMORPHONE HYDROCHLORIDE 2 MG/ML
4 INJECTION INTRAMUSCULAR; INTRAVENOUS; SUBCUTANEOUS EVERY 4 HOURS
Qty: 0 | Refills: 0 | Status: DISCONTINUED | OUTPATIENT
Start: 2018-09-22 | End: 2018-09-25

## 2018-09-22 RX ADMIN — GABAPENTIN 300 MILLIGRAM(S): 400 CAPSULE ORAL at 18:08

## 2018-09-22 RX ADMIN — HYDROMORPHONE HYDROCHLORIDE 4 MILLIGRAM(S): 2 INJECTION INTRAMUSCULAR; INTRAVENOUS; SUBCUTANEOUS at 18:09

## 2018-09-22 RX ADMIN — METHOCARBAMOL 500 MILLIGRAM(S): 500 TABLET, FILM COATED ORAL at 15:29

## 2018-09-22 RX ADMIN — HYDROMORPHONE HYDROCHLORIDE 4 MILLIGRAM(S): 2 INJECTION INTRAMUSCULAR; INTRAVENOUS; SUBCUTANEOUS at 13:50

## 2018-09-22 RX ADMIN — HYDROMORPHONE HYDROCHLORIDE 4 MILLIGRAM(S): 2 INJECTION INTRAMUSCULAR; INTRAVENOUS; SUBCUTANEOUS at 13:10

## 2018-09-22 RX ADMIN — ENOXAPARIN SODIUM 40 MILLIGRAM(S): 100 INJECTION SUBCUTANEOUS at 22:14

## 2018-09-22 RX ADMIN — HYDROMORPHONE HYDROCHLORIDE 4 MILLIGRAM(S): 2 INJECTION INTRAMUSCULAR; INTRAVENOUS; SUBCUTANEOUS at 18:45

## 2018-09-22 RX ADMIN — FAMOTIDINE 20 MILLIGRAM(S): 10 INJECTION INTRAVENOUS at 06:21

## 2018-09-22 RX ADMIN — SENNA PLUS 2 TABLET(S): 8.6 TABLET ORAL at 22:14

## 2018-09-22 RX ADMIN — GABAPENTIN 300 MILLIGRAM(S): 400 CAPSULE ORAL at 06:19

## 2018-09-22 RX ADMIN — Medication 100 MILLIGRAM(S): at 22:14

## 2018-09-22 RX ADMIN — Medication 100 MILLIGRAM(S): at 15:29

## 2018-09-22 RX ADMIN — FAMOTIDINE 20 MILLIGRAM(S): 10 INJECTION INTRAVENOUS at 18:08

## 2018-09-22 RX ADMIN — Medication 1: at 15:29

## 2018-09-22 RX ADMIN — HYDROMORPHONE HYDROCHLORIDE 4 MILLIGRAM(S): 2 INJECTION INTRAMUSCULAR; INTRAVENOUS; SUBCUTANEOUS at 22:45

## 2018-09-22 RX ADMIN — Medication 100 MILLIGRAM(S): at 06:19

## 2018-09-22 RX ADMIN — HYDROMORPHONE HYDROCHLORIDE 30 MILLILITER(S): 2 INJECTION INTRAMUSCULAR; INTRAVENOUS; SUBCUTANEOUS at 06:22

## 2018-09-22 RX ADMIN — HYDROMORPHONE HYDROCHLORIDE 30 MILLILITER(S): 2 INJECTION INTRAMUSCULAR; INTRAVENOUS; SUBCUTANEOUS at 11:23

## 2018-09-22 RX ADMIN — Medication 25 MILLIGRAM(S): at 13:11

## 2018-09-22 RX ADMIN — Medication 200 GRAM(S): at 20:23

## 2018-09-22 RX ADMIN — Medication 500 MILLIGRAM(S): at 18:09

## 2018-09-22 RX ADMIN — HYDROMORPHONE HYDROCHLORIDE 4 MILLIGRAM(S): 2 INJECTION INTRAMUSCULAR; INTRAVENOUS; SUBCUTANEOUS at 22:15

## 2018-09-22 RX ADMIN — Medication 500 MILLIGRAM(S): at 06:19

## 2018-09-22 NOTE — PROGRESS NOTE ADULT - ASSESSMENT
HPI:  Patient is a 33 year old female with low back pain that radiated to the RLE.  Now presented for surgery.    PROCEDURE: s/p right L4-L5 transforaminal lumbar interbody fusion on 9/20/2018   POD#2    PLAN:  -d/c pca, po dilaudid, gabapentin, and tylenol for pain control  -robaxin for muscle spasms  -lovenox and SCDs for DVT prophylaxis  -HISS for glucose control  -senna and colace for bowel regimen  -continue hemovac, monitor outputs  -regular diet  -out of bed with assistance  -incentive spirometer for lung expansion  -pt - outpatient pt w/ rolling walker, shower chair, and cane upon discharge  -am labs    Spectra #[id number]

## 2018-09-22 NOTE — PROGRESS NOTE ADULT - SUBJECTIVE AND OBJECTIVE BOX
HPI:  Patient is a 33 year old female with low back pain that radiated to the RLE.  Now presented for surgery.    OVERNIGHT EVENTS:  No acute events overnight.  Nausea has improved.  Still on dilaudid pca this am, incisional pain well controlled.  Leger removed last night and patient voiding.  Has been out of bed.    Vital Signs Last 24 Hrs  T(C): 36.7 (22 Sep 2018 10:10), Max: 37.2 (22 Sep 2018 01:17)  T(F): 98 (22 Sep 2018 10:10), Max: 99 (22 Sep 2018 01:17)  HR: 80 (22 Sep 2018 10:10) (76 - 80)  BP: 108/70 (22 Sep 2018 10:10) (93/58 - 108/70)  BP(mean): --  RR: 18 (22 Sep 2018 10:10) (17 - 18)  SpO2: 98% (22 Sep 2018 10:10) (97% - 99%)        PHYSICAL EXAM:  Neurological: awake, alert, oriented x3, follows commands, speech clear and fluent, moves all extremities x4 w/ 5/5 strength throughout, sensation present, intact, equal throughout    Cardiovascular: +s1, s2  Respiratory: clear to auscultation b/l  Gastrointestinal: soft, non-distended, non-tender  Genitourinary: +voiding  Incision/Wound: posterior midline vertical incision c/d/i w/ staples, hemovac x1    TUBES/LINES:  [x] hemovac x1 (120cc serosanguinous since per 24 hours)    DIET:  [x] regular    LABS:                        9.8    12.0  )-----------( 204      ( 22 Sep 2018 08:12 )             30.4         139  |  103  |  7   ----------------------------<  108<H>  3.9   |  28  |  0.65    Ca    7.7<L>      22 Sep 2018 08:12  Mg     1.8         TPro  5.8<L>  /  Alb  3.2<L>  /  TBili  0.1<L>  /  DBili  <0.1  /  AST  11  /  ALT  10  /  AlkPhos  46        Urinalysis Basic - ( 21 Sep 2018 14:05 )    Color: Light Yellow / Appearance: Clear / S.014 / pH: x  Gluc: x / Ketone: Negative  / Bili: Negative / Urobili: Negative   Blood: x / Protein: Negative / Nitrite: Negative   Leuk Esterase: Negative / RBC: x / WBC x   Sq Epi: x / Non Sq Epi: x / Bacteria: x        CAPILLARY BLOOD GLUCOSE      POCT Blood Glucose.: 104 mg/dL (22 Sep 2018 09:32)  POCT Blood Glucose.: 121 mg/dL (21 Sep 2018 21:55)  POCT Blood Glucose.: 141 mg/dL (21 Sep 2018 17:53)  POCT Blood Glucose.: 128 mg/dL (21 Sep 2018 14:28)      Allergies    tramadol (Pruritus)  Voltaren (Flushing; Urticaria; Rash; Swelling)  Voltaren (Pruritus; Hives)      MEDICATIONS:  Antibiotics:  ceFAZolin   IVPB 2000 milliGRAM(s) IV Intermittent once    Neuro:  acetaminophen   Tablet .. 650 milliGRAM(s) Oral every 6 hours PRN  gabapentin 300 milliGRAM(s) Oral two times a day  HYDROmorphone   Tablet 2 milliGRAM(s) Oral every 4 hours PRN  HYDROmorphone   Tablet 4 milliGRAM(s) Oral every 4 hours PRN  methocarbamol 500 milliGRAM(s) Oral every 6 hours PRN  ondansetron Injectable 4 milliGRAM(s) IV Push every 6 hours PRN    Anticoagulation:  enoxaparin Injectable 40 milliGRAM(s) SubCutaneous at bedtime    OTHER:  aluminum hydroxide/magnesium hydroxide/simethicone Suspension 30 milliLiter(s) Oral every 12 hours PRN  benzocaine 15 mG/menthol 3.6 mG Lozenge 1 Lozenge Oral every 6 hours  dextrose 40% Gel 15 Gram(s) Oral once PRN  dextrose 50% Injectable 12.5 Gram(s) IV Push once  dextrose 50% Injectable 25 Gram(s) IV Push once  dextrose 50% Injectable 25 Gram(s) IV Push once  diphenhydrAMINE   Injectable 25 milliGRAM(s) IV Push every 8 hours PRN  docusate sodium 100 milliGRAM(s) Oral three times a day  famotidine    Tablet 20 milliGRAM(s) Oral two times a day  glucagon  Injectable 1 milliGRAM(s) IntraMuscular once PRN  influenza   Vaccine 0.5 milliLiter(s) IntraMuscular once  insulin lispro (HumaLOG) corrective regimen sliding scale   SubCutaneous three times a day before meals  insulin lispro (HumaLOG) corrective regimen sliding scale   SubCutaneous at bedtime  senna 2 Tablet(s) Oral at bedtime    IVF:  ascorbic acid 500 milliGRAM(s) Oral two times a day  dextrose 5%. 1000 milliLiter(s) IV Continuous <Continuous>  sodium chloride 0.9% with potassium chloride 20 mEq/L 1000 milliLiter(s) IV Continuous <Continuous>    CULTURES:  none    RADIOLOGY & ADDITIONAL TESTS:  none

## 2018-09-23 LAB
ANION GAP SERPL CALC-SCNC: 7 MMOL/L — SIGNIFICANT CHANGE UP (ref 5–17)
BUN SERPL-MCNC: 6 MG/DL — LOW (ref 7–23)
CALCIUM SERPL-MCNC: 7.9 MG/DL — LOW (ref 8.4–10.5)
CHLORIDE SERPL-SCNC: 101 MMOL/L — SIGNIFICANT CHANGE UP (ref 96–108)
CO2 SERPL-SCNC: 31 MMOL/L — SIGNIFICANT CHANGE UP (ref 22–31)
CREAT SERPL-MCNC: 0.71 MG/DL — SIGNIFICANT CHANGE UP (ref 0.5–1.3)
GLUCOSE SERPL-MCNC: 88 MG/DL — SIGNIFICANT CHANGE UP (ref 70–99)
HCT VFR BLD CALC: 31.5 % — LOW (ref 34.5–45)
HGB BLD-MCNC: 10.5 G/DL — LOW (ref 11.5–15.5)
MCHC RBC-ENTMCNC: 29.4 PG — SIGNIFICANT CHANGE UP (ref 27–34)
MCHC RBC-ENTMCNC: 33.3 GM/DL — SIGNIFICANT CHANGE UP (ref 32–36)
MCV RBC AUTO: 88.2 FL — SIGNIFICANT CHANGE UP (ref 80–100)
PLATELET # BLD AUTO: 203 K/UL — SIGNIFICANT CHANGE UP (ref 150–400)
POTASSIUM SERPL-MCNC: 4.1 MMOL/L — SIGNIFICANT CHANGE UP (ref 3.5–5.3)
POTASSIUM SERPL-SCNC: 4.1 MMOL/L — SIGNIFICANT CHANGE UP (ref 3.5–5.3)
RBC # BLD: 3.57 M/UL — LOW (ref 3.8–5.2)
RBC # FLD: 12.4 % — SIGNIFICANT CHANGE UP (ref 10.3–14.5)
SODIUM SERPL-SCNC: 139 MMOL/L — SIGNIFICANT CHANGE UP (ref 135–145)
WBC # BLD: 10.1 K/UL — SIGNIFICANT CHANGE UP (ref 3.8–10.5)
WBC # FLD AUTO: 10.1 K/UL — SIGNIFICANT CHANGE UP (ref 3.8–10.5)

## 2018-09-23 RX ORDER — PANTOPRAZOLE SODIUM 20 MG/1
40 TABLET, DELAYED RELEASE ORAL
Qty: 0 | Refills: 0 | Status: DISCONTINUED | OUTPATIENT
Start: 2018-09-23 | End: 2018-09-25

## 2018-09-23 RX ORDER — METOCLOPRAMIDE HCL 10 MG
5 TABLET ORAL EVERY 6 HOURS
Qty: 0 | Refills: 0 | Status: DISCONTINUED | OUTPATIENT
Start: 2018-09-23 | End: 2018-09-25

## 2018-09-23 RX ORDER — PANTOPRAZOLE SODIUM 20 MG/1
40 TABLET, DELAYED RELEASE ORAL ONCE
Qty: 0 | Refills: 0 | Status: COMPLETED | OUTPATIENT
Start: 2018-09-23 | End: 2018-09-23

## 2018-09-23 RX ORDER — POLYETHYLENE GLYCOL 3350 17 G/17G
17 POWDER, FOR SOLUTION ORAL DAILY
Qty: 0 | Refills: 0 | Status: DISCONTINUED | OUTPATIENT
Start: 2018-09-23 | End: 2018-09-25

## 2018-09-23 RX ORDER — ACETAMINOPHEN 500 MG
1000 TABLET ORAL ONCE
Qty: 0 | Refills: 0 | Status: COMPLETED | OUTPATIENT
Start: 2018-09-23 | End: 2018-09-23

## 2018-09-23 RX ORDER — GABAPENTIN 400 MG/1
300 CAPSULE ORAL EVERY 8 HOURS
Qty: 0 | Refills: 0 | Status: DISCONTINUED | OUTPATIENT
Start: 2018-09-23 | End: 2018-09-25

## 2018-09-23 RX ADMIN — HYDROMORPHONE HYDROCHLORIDE 4 MILLIGRAM(S): 2 INJECTION INTRAMUSCULAR; INTRAVENOUS; SUBCUTANEOUS at 03:00

## 2018-09-23 RX ADMIN — ONDANSETRON 4 MILLIGRAM(S): 8 TABLET, FILM COATED ORAL at 19:01

## 2018-09-23 RX ADMIN — HYDROMORPHONE HYDROCHLORIDE 4 MILLIGRAM(S): 2 INJECTION INTRAMUSCULAR; INTRAVENOUS; SUBCUTANEOUS at 19:01

## 2018-09-23 RX ADMIN — ENOXAPARIN SODIUM 40 MILLIGRAM(S): 100 INJECTION SUBCUTANEOUS at 23:42

## 2018-09-23 RX ADMIN — HYDROMORPHONE HYDROCHLORIDE 4 MILLIGRAM(S): 2 INJECTION INTRAMUSCULAR; INTRAVENOUS; SUBCUTANEOUS at 19:31

## 2018-09-23 RX ADMIN — Medication 100 MILLIGRAM(S): at 06:09

## 2018-09-23 RX ADMIN — Medication 400 MILLIGRAM(S): at 11:25

## 2018-09-23 RX ADMIN — HYDROMORPHONE HYDROCHLORIDE 4 MILLIGRAM(S): 2 INJECTION INTRAMUSCULAR; INTRAVENOUS; SUBCUTANEOUS at 10:42

## 2018-09-23 RX ADMIN — HYDROMORPHONE HYDROCHLORIDE 4 MILLIGRAM(S): 2 INJECTION INTRAMUSCULAR; INTRAVENOUS; SUBCUTANEOUS at 06:51

## 2018-09-23 RX ADMIN — Medication 30 MILLILITER(S): at 10:43

## 2018-09-23 RX ADMIN — SENNA PLUS 2 TABLET(S): 8.6 TABLET ORAL at 23:42

## 2018-09-23 RX ADMIN — HYDROMORPHONE HYDROCHLORIDE 4 MILLIGRAM(S): 2 INJECTION INTRAMUSCULAR; INTRAVENOUS; SUBCUTANEOUS at 02:27

## 2018-09-23 RX ADMIN — FAMOTIDINE 20 MILLIGRAM(S): 10 INJECTION INTRAVENOUS at 06:09

## 2018-09-23 RX ADMIN — GABAPENTIN 300 MILLIGRAM(S): 400 CAPSULE ORAL at 23:42

## 2018-09-23 RX ADMIN — GABAPENTIN 300 MILLIGRAM(S): 400 CAPSULE ORAL at 06:09

## 2018-09-23 RX ADMIN — Medication 650 MILLIGRAM(S): at 23:44

## 2018-09-23 RX ADMIN — Medication 1000 MILLIGRAM(S): at 11:45

## 2018-09-23 RX ADMIN — Medication 100 MILLIGRAM(S): at 13:22

## 2018-09-23 RX ADMIN — Medication 5 MILLIGRAM(S): at 23:41

## 2018-09-23 RX ADMIN — Medication 500 MILLIGRAM(S): at 06:09

## 2018-09-23 RX ADMIN — METHOCARBAMOL 500 MILLIGRAM(S): 500 TABLET, FILM COATED ORAL at 13:22

## 2018-09-23 RX ADMIN — PANTOPRAZOLE SODIUM 40 MILLIGRAM(S): 20 TABLET, DELAYED RELEASE ORAL at 12:05

## 2018-09-23 RX ADMIN — HYDROMORPHONE HYDROCHLORIDE 4 MILLIGRAM(S): 2 INJECTION INTRAMUSCULAR; INTRAVENOUS; SUBCUTANEOUS at 06:09

## 2018-09-23 RX ADMIN — Medication 100 MILLIGRAM(S): at 23:42

## 2018-09-23 RX ADMIN — GABAPENTIN 300 MILLIGRAM(S): 400 CAPSULE ORAL at 13:22

## 2018-09-23 NOTE — PROGRESS NOTE ADULT - ASSESSMENT
HPI:  Patient is a 33 year old female with low back pain that radiated to the RLE.  Now presented for surgery.    PROCEDURE: s/p right L4-L5 transforaminal lumbar interbody fusion on 9/20/2018   POD#3    PLAN:  - po dilaudid, gabapentin, and tylenol for pain control  -robaxin for muscle spasms  -lovenox and SCDs for DVT prophylaxis  -d/c HISS as no units used   -senna and colace for bowel regimen  -continue hemovac, monitor outputs  -regular diet  -out of bed with assistance  -incentive spirometer for lung expansion  -pt - outpatient pt w/ rolling walker, shower chair, and cane upon discharge  - post operative anemia stable    Spectra #[id number] HPI:  Patient is a 33 year old female with low back pain that radiated to the RLE.  Now presented for surgery.    PROCEDURE: s/p right L4-L5 transforaminal lumbar interbody fusion on 9/20/2018   POD#3    PLAN:  - po dilaudid, gabapentin, and tylenol for pain control  -robaxin for muscle spasms  -lovenox and SCDs for DVT prophylaxis  -d/c HISS as no units used   -senna and colace for bowel regimen  -continue hemovac, monitor outputs  -regular diet  -out of bed with assistance  -incentive spirometer for lung expansion  -pt - outpatient pt w/ rolling walker, shower chair, and cane upon discharge  - post operative anemia stable  - zofran given for nausea and vomiting   Spectra #[id number]

## 2018-09-23 NOTE — PROGRESS NOTE ADULT - SUBJECTIVE AND OBJECTIVE BOX
SUBJECTIVE: Patient is a 33 year old female with low back pain that radiated to the RLE.      OVERNIGHT EVENTS: incisional pain controled.      Vital Signs Last 24 Hrs  T(C): 36.9 (23 Sep 2018 06:08), Max: 37.5 (22 Sep 2018 21:33)  T(F): 98.5 (23 Sep 2018 06:08), Max: 99.5 (22 Sep 2018 21:33)  HR: 67 (23 Sep 2018 06:08) (67 - 98)  BP: 102/69 (23 Sep 2018 06:08) (96/66 - 117/77)  BP(mean): --  RR: 18 (23 Sep 2018 06:08) (18 - 18)  SpO2: 100% (23 Sep 2018 06:08) (96% - 100%)    PHYSICAL EXAM:    Constitutional: No Acute Distress     Neurological:  awake, alert, oriented x3, follows commands, speech clear and fluent, moves all extremities x4 w/ 5/5 strength throughout, sensation present, intact, equal throughout                                                Pulmonary: Clear to Auscultation, No rales, No rhonchi, No wheezes     Cardiovascular: S1, S2, Regular rate and rhythm     Gastrointestinal: Soft, Non-tender, Non-distended     Extremities: No calf tenderness     Incision: c/d/i  LABS:                        10.5   10.1  )-----------( 203      ( 23 Sep 2018 07:22 )             31.5    09-23    139  |  101  |  6<L>  ----------------------------<  88  4.1   |  31  |  0.71    Ca    7.9<L>      23 Sep 2018 07:22    TPro  5.8<L>  /  Alb  3.2<L>  /  TBili  0.1<L>  /  DBili  <0.1  /  AST  11  /  ALT  10  /  AlkPhos  46  09-22  Hemoglobin A1C, Whole Blood: 5.3 % (09-21-18 @ 10:38)      09-22 @ 07:01  -  09-23 @ 07:00  --------------------------------------------------------  IN: 1480 mL / OUT: 2025 mL / NET: -545 mL      DRAINS: hmv 125    MEDICATIONS:  Anticoagulation:   enoxaparin Injectable 40 milliGRAM(s) SubCutaneous at bedtime    Antibiotics:    Endo:  dextrose 50% Injectable 25 Gram(s) IV Push once    Neuro:  acetaminophen   Tablet .. 650 milliGRAM(s) Oral every 6 hours PRN Temp greater or equal to 38C (100.4F)  gabapentin 300 milliGRAM(s) Oral two times a day  HYDROmorphone   Tablet 2 milliGRAM(s) Oral every 4 hours PRN Moderate Pain (4 - 6)  HYDROmorphone   Tablet 4 milliGRAM(s) Oral every 4 hours PRN Severe Pain (7 - 10)  methocarbamol 500 milliGRAM(s) Oral every 6 hours PRN Back Spasms  ondansetron Injectable 4 milliGRAM(s) IV Push every 6 hours PRN Nausea    Cardiac:    Pulm:  diphenhydrAMINE   Injectable 25 milliGRAM(s) IV Push every 8 hours PRN Itching    GI/:  aluminum hydroxide/magnesium hydroxide/simethicone Suspension 30 milliLiter(s) Oral every 12 hours PRN Indigestion  docusate sodium 100 milliGRAM(s) Oral three times a day  famotidine    Tablet 20 milliGRAM(s) Oral two times a day  senna 2 Tablet(s) Oral at bedtime    Other:   benzocaine 15 mG/menthol 3.6 mG Lozenge 1 Lozenge Oral every 6 hours  influenza   Vaccine 0.5 milliLiter(s) IntraMuscular once    DIET: regular    IMAGING: SUBJECTIVE: Patient is a 33 year old female with low back pain that radiated to the RLE.      OVERNIGHT EVENTS: nauseous.      Vital Signs Last 24 Hrs  T(C): 36.9 (23 Sep 2018 06:08), Max: 37.5 (22 Sep 2018 21:33)  T(F): 98.5 (23 Sep 2018 06:08), Max: 99.5 (22 Sep 2018 21:33)  HR: 67 (23 Sep 2018 06:08) (67 - 98)  BP: 102/69 (23 Sep 2018 06:08) (96/66 - 117/77)  BP(mean): --  RR: 18 (23 Sep 2018 06:08) (18 - 18)  SpO2: 100% (23 Sep 2018 06:08) (96% - 100%)    PHYSICAL EXAM:    Constitutional: No Acute Distress     Neurological:  awake, alert, oriented x3, follows commands, speech clear and fluent, moves all extremities x4 w/ 5/5 strength throughout, sensation present, intact, equal throughout                                                Pulmonary: Clear to Auscultation, No rales, No rhonchi, No wheezes     Cardiovascular: S1, S2, Regular rate and rhythm     Gastrointestinal: Soft, Non-tender, Non-distended     Extremities: No calf tenderness     Incision: c/d/i  LABS:                        10.5   10.1  )-----------( 203      ( 23 Sep 2018 07:22 )             31.5    09-23    139  |  101  |  6<L>  ----------------------------<  88  4.1   |  31  |  0.71    Ca    7.9<L>      23 Sep 2018 07:22    TPro  5.8<L>  /  Alb  3.2<L>  /  TBili  0.1<L>  /  DBili  <0.1  /  AST  11  /  ALT  10  /  AlkPhos  46  09-22  Hemoglobin A1C, Whole Blood: 5.3 % (09-21-18 @ 10:38)      09-22 @ 07:01  -  09-23 @ 07:00  --------------------------------------------------------  IN: 1480 mL / OUT: 2025 mL / NET: -545 mL      DRAINS: hmv 125    MEDICATIONS:  Anticoagulation:   enoxaparin Injectable 40 milliGRAM(s) SubCutaneous at bedtime    Antibiotics:    Endo:  dextrose 50% Injectable 25 Gram(s) IV Push once    Neuro:  acetaminophen   Tablet .. 650 milliGRAM(s) Oral every 6 hours PRN Temp greater or equal to 38C (100.4F)  gabapentin 300 milliGRAM(s) Oral two times a day  HYDROmorphone   Tablet 2 milliGRAM(s) Oral every 4 hours PRN Moderate Pain (4 - 6)  HYDROmorphone   Tablet 4 milliGRAM(s) Oral every 4 hours PRN Severe Pain (7 - 10)  methocarbamol 500 milliGRAM(s) Oral every 6 hours PRN Back Spasms  ondansetron Injectable 4 milliGRAM(s) IV Push every 6 hours PRN Nausea    Cardiac:    Pulm:  diphenhydrAMINE   Injectable 25 milliGRAM(s) IV Push every 8 hours PRN Itching    GI/:  aluminum hydroxide/magnesium hydroxide/simethicone Suspension 30 milliLiter(s) Oral every 12 hours PRN Indigestion  docusate sodium 100 milliGRAM(s) Oral three times a day  famotidine    Tablet 20 milliGRAM(s) Oral two times a day  senna 2 Tablet(s) Oral at bedtime    Other:   benzocaine 15 mG/menthol 3.6 mG Lozenge 1 Lozenge Oral every 6 hours  influenza   Vaccine 0.5 milliLiter(s) IntraMuscular once    DIET: regular    IMAGING:

## 2018-09-24 ENCOUNTER — TRANSCRIPTION ENCOUNTER (OUTPATIENT)
Age: 33
End: 2018-09-24

## 2018-09-24 LAB
ANION GAP SERPL CALC-SCNC: 8 MMOL/L — SIGNIFICANT CHANGE UP (ref 5–17)
BUN SERPL-MCNC: 7 MG/DL — SIGNIFICANT CHANGE UP (ref 7–23)
CALCIUM SERPL-MCNC: 8.1 MG/DL — LOW (ref 8.4–10.5)
CHLORIDE SERPL-SCNC: 99 MMOL/L — SIGNIFICANT CHANGE UP (ref 96–108)
CO2 SERPL-SCNC: 30 MMOL/L — SIGNIFICANT CHANGE UP (ref 22–31)
CREAT SERPL-MCNC: 0.69 MG/DL — SIGNIFICANT CHANGE UP (ref 0.5–1.3)
GLUCOSE SERPL-MCNC: 92 MG/DL — SIGNIFICANT CHANGE UP (ref 70–99)
HCT VFR BLD CALC: 33.8 % — LOW (ref 34.5–45)
HGB BLD-MCNC: 11.1 G/DL — LOW (ref 11.5–15.5)
MCHC RBC-ENTMCNC: 28.6 PG — SIGNIFICANT CHANGE UP (ref 27–34)
MCHC RBC-ENTMCNC: 32.8 GM/DL — SIGNIFICANT CHANGE UP (ref 32–36)
MCV RBC AUTO: 87 FL — SIGNIFICANT CHANGE UP (ref 80–100)
PLATELET # BLD AUTO: 237 K/UL — SIGNIFICANT CHANGE UP (ref 150–400)
POTASSIUM SERPL-MCNC: 4.1 MMOL/L — SIGNIFICANT CHANGE UP (ref 3.5–5.3)
POTASSIUM SERPL-SCNC: 4.1 MMOL/L — SIGNIFICANT CHANGE UP (ref 3.5–5.3)
RBC # BLD: 3.89 M/UL — SIGNIFICANT CHANGE UP (ref 3.8–5.2)
RBC # FLD: 12 % — SIGNIFICANT CHANGE UP (ref 10.3–14.5)
SODIUM SERPL-SCNC: 137 MMOL/L — SIGNIFICANT CHANGE UP (ref 135–145)
WBC # BLD: 9.2 K/UL — SIGNIFICANT CHANGE UP (ref 3.8–10.5)
WBC # FLD AUTO: 9.2 K/UL — SIGNIFICANT CHANGE UP (ref 3.8–10.5)

## 2018-09-24 RX ORDER — CYCLOBENZAPRINE HYDROCHLORIDE 10 MG/1
5 TABLET, FILM COATED ORAL THREE TIMES A DAY
Qty: 0 | Refills: 0 | Status: DISCONTINUED | OUTPATIENT
Start: 2018-09-24 | End: 2018-09-25

## 2018-09-24 RX ORDER — ACETAMINOPHEN 500 MG
1000 TABLET ORAL ONCE
Qty: 0 | Refills: 0 | Status: COMPLETED | OUTPATIENT
Start: 2018-09-24 | End: 2018-09-24

## 2018-09-24 RX ADMIN — HYDROMORPHONE HYDROCHLORIDE 4 MILLIGRAM(S): 2 INJECTION INTRAMUSCULAR; INTRAVENOUS; SUBCUTANEOUS at 21:07

## 2018-09-24 RX ADMIN — Medication 650 MILLIGRAM(S): at 00:14

## 2018-09-24 RX ADMIN — HYDROMORPHONE HYDROCHLORIDE 4 MILLIGRAM(S): 2 INJECTION INTRAMUSCULAR; INTRAVENOUS; SUBCUTANEOUS at 20:37

## 2018-09-24 RX ADMIN — CYCLOBENZAPRINE HYDROCHLORIDE 5 MILLIGRAM(S): 10 TABLET, FILM COATED ORAL at 21:50

## 2018-09-24 RX ADMIN — ONDANSETRON 4 MILLIGRAM(S): 8 TABLET, FILM COATED ORAL at 09:03

## 2018-09-24 RX ADMIN — GABAPENTIN 300 MILLIGRAM(S): 400 CAPSULE ORAL at 13:43

## 2018-09-24 RX ADMIN — GABAPENTIN 300 MILLIGRAM(S): 400 CAPSULE ORAL at 06:17

## 2018-09-24 RX ADMIN — HYDROMORPHONE HYDROCHLORIDE 4 MILLIGRAM(S): 2 INJECTION INTRAMUSCULAR; INTRAVENOUS; SUBCUTANEOUS at 06:46

## 2018-09-24 RX ADMIN — Medication 100 MILLIGRAM(S): at 21:50

## 2018-09-24 RX ADMIN — Medication 100 MILLIGRAM(S): at 06:17

## 2018-09-24 RX ADMIN — Medication 5 MILLIGRAM(S): at 06:46

## 2018-09-24 RX ADMIN — ONDANSETRON 4 MILLIGRAM(S): 8 TABLET, FILM COATED ORAL at 14:29

## 2018-09-24 RX ADMIN — Medication 400 MILLIGRAM(S): at 12:06

## 2018-09-24 RX ADMIN — Medication 5 MILLIGRAM(S): at 20:38

## 2018-09-24 RX ADMIN — PANTOPRAZOLE SODIUM 40 MILLIGRAM(S): 20 TABLET, DELAYED RELEASE ORAL at 06:17

## 2018-09-24 RX ADMIN — ENOXAPARIN SODIUM 40 MILLIGRAM(S): 100 INJECTION SUBCUTANEOUS at 21:50

## 2018-09-24 RX ADMIN — CYCLOBENZAPRINE HYDROCHLORIDE 5 MILLIGRAM(S): 10 TABLET, FILM COATED ORAL at 14:29

## 2018-09-24 RX ADMIN — Medication 1000 MILLIGRAM(S): at 12:20

## 2018-09-24 RX ADMIN — GABAPENTIN 300 MILLIGRAM(S): 400 CAPSULE ORAL at 21:50

## 2018-09-24 RX ADMIN — Medication 100 MILLIGRAM(S): at 13:43

## 2018-09-24 RX ADMIN — SENNA PLUS 2 TABLET(S): 8.6 TABLET ORAL at 21:49

## 2018-09-24 RX ADMIN — HYDROMORPHONE HYDROCHLORIDE 4 MILLIGRAM(S): 2 INJECTION INTRAMUSCULAR; INTRAVENOUS; SUBCUTANEOUS at 07:20

## 2018-09-24 NOTE — DISCHARGE NOTE ADULT - CARE PLAN
Principal Discharge DX:	Herniated nucleus pulposus, L4-5  Goal:	Increase activity and improve strength.  Assessment and plan of treatment:	Follow up with Rain Wooten Neurosurgeon in 7-10 days-Please call office to confirm appointment.   Follow up with primary care provider in 2 weeks . Principal Discharge DX:	Herniated nucleus pulposus, L4-5  Goal:	Please follow up Neurosurgeon in one week. Please call office to make appointment. Please follow up with Primary Care Physician. Please call office to make appointment.  Assessment and plan of treatment:	Please follow up Neurosurgeon in one week. Please call office to make appointment. Please follow up with Primary Care Physician. Please call office to make appointment. .  Secondary Diagnosis:	Acid reflux  Assessment and plan of treatment:	Please make an appointment for follow up with your primary care physician after discharge.  Secondary Diagnosis:	S/P partial thyroidectomy  Assessment and plan of treatment:	Please make an appointment for follow up with your primary care physician after discharge.

## 2018-09-24 NOTE — DISCHARGE NOTE ADULT - REASON FOR ADMISSION
Patient was admitted with back pain radiating to bilateral legs. Patient had a L4-L5 trans laminar interbody fusion done electively on 9/20

## 2018-09-24 NOTE — DISCHARGE NOTE ADULT - HOSPITAL COURSE
Patient had L4-L5 trans laminar interbody fusion done on 9/20 . Post surgery patient was moved to pacu and was monitored. Patient was given iv fluids, pain meds and routine home meds. Patient was moved to floor subsequently. Patient was seen by physical therapy on the floor and was cleared for discharge to home. Patient was given pain meds and decadron taper for pain. Patients hemovac drains were removed on the floor. Patient was discharged home with follow up instructions. No other complications noted. 33 years old female H/O Irregular Menstruation, Acid Reflux Thyroid CA s/p partial thyroidectomy 2017, Herniated nucleus pulposus s/p microdiscectomy (11/2016)   C/O lumbar pain shooting down to RLE started about 2 month ago with numbness and tingling to Right foot now scheduled for L4-L5 Posterior Lumbar Interbody Fusion on 9/20/18.  She curently denies gait instability, loss of bowel or bladder function. She is s/p  L4-L5 trans laminar interbody fusionon 9/20 . Her post op course was uncomplicated. PT/OT saw the patient and they recommended outpatient PT. At the time of discharge patient is neurologically and hemodynamically and clear for discharge home.

## 2018-09-24 NOTE — DISCHARGE NOTE ADULT - NS AS ACTIVITY OBS
Walking-Outdoors allowed/Do not make important decisions/Do not drive or operate machinery/Walking-Indoors allowed/No Heavy lifting/straining/Stairs allowed/Showering allowed

## 2018-09-24 NOTE — DISCHARGE NOTE ADULT - PATIENT PORTAL LINK FT
You can access the [facility] Patient Portal, offered by [facility], by registering with the following website: [email or web address]

## 2018-09-24 NOTE — DISCHARGE NOTE ADULT - PLAN OF CARE
Increase activity and improve strength. Follow up with Rain Wooten Neurosurgeon in 7-10 days-Please call office to confirm appointment.   Follow up with primary care provider in 2 weeks . Please follow up Neurosurgeon in one week. Please call office to make appointment. Please follow up with Primary Care Physician. Please call office to make appointment. Please follow up Neurosurgeon in one week. Please call office to make appointment. Please follow up with Primary Care Physician. Please call office to make appointment. . Please make an appointment for follow up with your primary care physician after discharge.

## 2018-09-24 NOTE — PROGRESS NOTE ADULT - SUBJECTIVE AND OBJECTIVE BOX
SUBJECTIVE: Patient was seen and evaluated at bedside. Patient is resting in bed and is in no new acute distress.     OVERNIGHT EVENTS: none     Vital Signs Last 24 Hrs  T(C): 37.2 (24 Sep 2018 08:20), Max: 37.3 (23 Sep 2018 20:55)  T(F): 98.9 (24 Sep 2018 08:20), Max: 99.2 (23 Sep 2018 20:55)  HR: 78 (24 Sep 2018 08:20) (76 - 91)  BP: 98/69 (24 Sep 2018 08:20) (93/61 - 107/94)  BP(mean): --  RR: 18 (24 Sep 2018 08:20) (18 - 18)  SpO2: 99% (24 Sep 2018 08:20) (96% - 100%)    PHYSICAL EXAM:    General: No Acute Distress     Neurological: Awake, alert oriented to person, place and time, Following Commands, PERRL, EOMI, Face Symmetrical, Speech Fluent, Moving all extremities, Muscle Strength normal in all four extremities, No Drift, Sensation to Light Touch Intact    Pulmonary: Clear to Auscultation, No Rales, No Rhonchi, No Wheezes     Cardiovascular: S1, S2, Regular Rate and Rhythm     Gastrointestinal: Soft, Nontender, Nondistended     Incision: clean and dry     LABS:                        11.1   9.2   )-----------( 237      ( 24 Sep 2018 07:14 )             33.8    09-24    137  |  99  |  7   ----------------------------<  92  4.1   |  30  |  0.69    Ca    8.1<L>      24 Sep 2018 07:14      Hemoglobin A1C, Whole Blood: 5.3 % (09-21 @ 10:38)      09-23 @ 07:01  -  09-24 @ 07:00  --------------------------------------------------------  IN: 1210 mL / OUT: 935 mL / NET: 275 mL      DRAINS: hemovac- 135 cc     MEDICATIONS:  Antibiotics:    Neuro:  acetaminophen   Tablet .. 650 milliGRAM(s) Oral every 6 hours PRN Temp greater or equal to 38C (100.4F)  gabapentin 300 milliGRAM(s) Oral every 8 hours  HYDROmorphone   Tablet 2 milliGRAM(s) Oral every 4 hours PRN Moderate Pain (4 - 6)  HYDROmorphone   Tablet 4 milliGRAM(s) Oral every 4 hours PRN Severe Pain (7 - 10)  methocarbamol 500 milliGRAM(s) Oral every 6 hours PRN Back Spasms  ondansetron Injectable 4 milliGRAM(s) IV Push every 6 hours PRN Nausea    Cardiac:    Pulm:  diphenhydrAMINE   Injectable 25 milliGRAM(s) IV Push every 8 hours PRN Itching    GI/:  aluminum hydroxide/magnesium hydroxide/simethicone Suspension 30 milliLiter(s) Oral every 12 hours PRN Indigestion  docusate sodium 100 milliGRAM(s) Oral three times a day  metoclopramide 5 milliGRAM(s) Oral every 6 hours PRN nausea  pantoprazole    Tablet 40 milliGRAM(s) Oral before breakfast  polyethylene glycol 3350 17 Gram(s) Oral daily  senna 2 Tablet(s) Oral at bedtime    Other:   benzocaine 15 mG/menthol 3.6 mG Lozenge 1 Lozenge Oral every 6 hours  dextrose 50% Injectable 25 Gram(s) IV Push once  enoxaparin Injectable 40 milliGRAM(s) SubCutaneous at bedtime  influenza   Vaccine 0.5 milliLiter(s) IntraMuscular once    DIET: [] Regular [] CCD [] Renal [] Puree [] Dysphagia [] Tube Feeds: regular     IMAGING: no new imagining today

## 2018-09-24 NOTE — DISCHARGE NOTE ADULT - ADDITIONAL INSTRUCTIONS
May take shower 4 days after surgery-Let water run over the surgical site and pat dry after shower.  If notice any new weakness, numbness, tingling, severe pain in the back  or fever then contact physician immediately or come to emergency room. May take shower 4 days after surgery-Let water run over the surgical site and pat dry after shower.  please keep incision clean and dry, do not submerge wound in water for prolonged periods of time, pat dry after showering, and do not use any creams or ointments to incision.   Staples will be removed at follow up appointment. Keep incision site clean and dry. OK to shower but do not scrub incision area and pat dry when done.

## 2018-09-24 NOTE — DISCHARGE NOTE ADULT - MEDICATION SUMMARY - MEDICATIONS TO TAKE
I will START or STAY ON the medications listed below when I get home from the hospital:    Rolling Walker  -- use as directed    dx: s/p spine surgery  -- Indication: For S/P spine surgery    Shower Chair  -- use as directed    dx: s/p spine surgery  -- Indication: For S/P spine surgery    HYDROmorphone 4 mg oral tablet  -- 1 tab(s) by mouth every 4 hours, As needed, Severe Pain (7 - 10) MDD:6  -- Indication: For Pain    Tylenol 500 mg oral tablet  -- 2 tab(s) by mouth every 6 hours, As Needed - for moderate pain  -- Indication: For Pain    aluminum hydroxide-magnesium hydroxide 200 mg-200 mg/5 mL oral suspension  -- 30 milliliter(s) by mouth every 12 hours, As needed, Indigestion  -- Indication: For constipation    gabapentin 300 mg oral capsule  -- 1 cap(s) by mouth 2 times a day  -- Indication: For neuropathy    ondansetron 4 mg oral tablet  -- 1 tab(s) by mouth every 8 hours, As needed, Nausea and/or Vomiting MDD:3  -- Indication: For nausea    senna oral tablet  -- 2 tab(s) by mouth once a day (at bedtime)  -- Indication: For constipation    docusate sodium 100 mg oral capsule  -- 1 cap(s) by mouth 3 times a day  -- Indication: For constipation    cyclobenzaprine 5 mg oral tablet  -- 1 tab(s) by mouth 3 times a day MDD:3  -- Indication: For muscle spasms

## 2018-09-24 NOTE — DISCHARGE NOTE ADULT - INSTRUCTIONS
regular diet regular diet  NO heavy lifting, strenous activity, twisting, bending, driving, or working until cleared by your physician.  Please return to the emergency department if you develop changes in mental status, seizures, fainting, dizziness, changes in vision, lethargy, nausea, vomiting, chest pain, shortness of breathe or severe pain.

## 2018-09-24 NOTE — PROGRESS NOTE ADULT - ASSESSMENT
HPI:  33 years old female H/O Irregular Menstruation, Acid Reflux Thyroid CA s/p partial thyroidectomy 2017, Herniated nucleus pulposus s/p microdiscectomy (11/2016)   C/O lumbar pain shooting down to RLE started about 2 month ago with numbness and tingling to Right foot now scheduled for L4-L5 Posterior Lumbar Interbody Fusion on 9/20/18.  She curently denies gait instability, loss of bowel or bladder function. (13 Sep 2018 13:51)    PROCEDURE: s/p l4-l5 translaminar interbody fusion on 9/20      POD# 4    PLAN:  1 Out of bed   2 continue physical therapy   3 dvt ppx sql scds   4 prn pain meds   5 continue hemovac   6 continue gabapentin   7 regular diet   8 stool softeners   9 dispo: p home in am   Assessment:  Please Check When Present   []  GCS  E   V  M     Heart Failure: []Acute, [] acute on chronic , []chronic  Heart Failure:  [] Diastolic (HFpEF), [] Systolic (HFrEF), []Combined (HFpEF and HFrEF), [] RHF, [] Pulm HTN, [] Other    [] ENOC, [] ATN, [] AIN, [] other  [] CKD1, [] CKD2, [] CKD 3, [] CKD 4, [] CKD 5, []ESRD    Encephalopathy: [] Metabolic, [] Hepatic, [] toxic, [] Neurological, [] Other    Abnormal Nurtitional Status: [] malnurtition (see nutrition note), [ ]underweight: BMI < 19, [] morbid obesity: BMI >40, [] Cachexia    [] Sepsis  [] hypovolemic shock,[] cardiogenic shock, [] hemorrhagic shock, [] neuogenic shock  [] Acute Respiratory Failure  []Cerebral edema, [] Brain compression/ herniation,   [] Functional quadriplegia  [] Acute blood loss anemia

## 2018-09-25 ENCOUNTER — TRANSCRIPTION ENCOUNTER (OUTPATIENT)
Age: 33
End: 2018-09-25

## 2018-09-25 VITALS
RESPIRATION RATE: 18 BRPM | SYSTOLIC BLOOD PRESSURE: 99 MMHG | OXYGEN SATURATION: 95 % | TEMPERATURE: 98 F | DIASTOLIC BLOOD PRESSURE: 69 MMHG | HEART RATE: 82 BPM

## 2018-09-25 RX ORDER — ONDANSETRON 8 MG/1
4 TABLET, FILM COATED ORAL EVERY 8 HOURS
Qty: 0 | Refills: 0 | Status: DISCONTINUED | OUTPATIENT
Start: 2018-09-25 | End: 2018-09-25

## 2018-09-25 RX ORDER — SENNA PLUS 8.6 MG/1
2 TABLET ORAL
Qty: 0 | Refills: 0 | DISCHARGE
Start: 2018-09-25

## 2018-09-25 RX ORDER — ONDANSETRON 8 MG/1
1 TABLET, FILM COATED ORAL
Qty: 30 | Refills: 0
Start: 2018-09-25

## 2018-09-25 RX ORDER — HYDROMORPHONE HYDROCHLORIDE 2 MG/ML
1 INJECTION INTRAMUSCULAR; INTRAVENOUS; SUBCUTANEOUS
Qty: 30 | Refills: 0
Start: 2018-09-25

## 2018-09-25 RX ORDER — DOCUSATE SODIUM 100 MG
1 CAPSULE ORAL
Qty: 0 | Refills: 0 | DISCHARGE
Start: 2018-09-25

## 2018-09-25 RX ORDER — CYCLOBENZAPRINE HYDROCHLORIDE 10 MG/1
1 TABLET, FILM COATED ORAL
Qty: 30 | Refills: 0
Start: 2018-09-25

## 2018-09-25 RX ADMIN — Medication 100 MILLIGRAM(S): at 13:32

## 2018-09-25 RX ADMIN — CYCLOBENZAPRINE HYDROCHLORIDE 5 MILLIGRAM(S): 10 TABLET, FILM COATED ORAL at 13:32

## 2018-09-25 RX ADMIN — POLYETHYLENE GLYCOL 3350 17 GRAM(S): 17 POWDER, FOR SOLUTION ORAL at 13:32

## 2018-09-25 RX ADMIN — ONDANSETRON 4 MILLIGRAM(S): 8 TABLET, FILM COATED ORAL at 11:55

## 2018-09-25 RX ADMIN — PANTOPRAZOLE SODIUM 40 MILLIGRAM(S): 20 TABLET, DELAYED RELEASE ORAL at 06:11

## 2018-09-25 RX ADMIN — GABAPENTIN 300 MILLIGRAM(S): 400 CAPSULE ORAL at 05:49

## 2018-09-25 RX ADMIN — CYCLOBENZAPRINE HYDROCHLORIDE 5 MILLIGRAM(S): 10 TABLET, FILM COATED ORAL at 05:49

## 2018-09-25 RX ADMIN — HYDROMORPHONE HYDROCHLORIDE 4 MILLIGRAM(S): 2 INJECTION INTRAMUSCULAR; INTRAVENOUS; SUBCUTANEOUS at 12:30

## 2018-09-25 RX ADMIN — Medication 100 MILLIGRAM(S): at 05:49

## 2018-09-25 RX ADMIN — HYDROMORPHONE HYDROCHLORIDE 4 MILLIGRAM(S): 2 INJECTION INTRAMUSCULAR; INTRAVENOUS; SUBCUTANEOUS at 11:55

## 2018-09-25 RX ADMIN — GABAPENTIN 300 MILLIGRAM(S): 400 CAPSULE ORAL at 13:32

## 2018-09-25 NOTE — PROGRESS NOTE ADULT - PROVIDER SPECIALTY LIST ADULT
Anesthesia
Neurosurgery

## 2018-09-25 NOTE — CHART NOTE - NSCHARTNOTEFT_GEN_A_CORE
ILEANA [id number]      Drain type: []SD []SG [] SARWAT [x] HMV [] Lumbar drain [] EVD [] ICP [place] [] Abd drain     Patient's position while drain removed: supine     [x] Patient tolerated well [x] No complications [] complications:       Additional Info: Asked by neurosurgery team to remove drain. patient tolerated procedure well.

## 2018-09-25 NOTE — PROGRESS NOTE ADULT - ASSESSMENT
33 years old female H/O Irregular Menstruation, Acid Reflux Thyroid CA s/p partial thyroidectomy 2017, Herniated nucleus pulposus s/p microdiscectomy (11/2016)   C/O lumbar pain shooting down to RLE started about 2 month ago with numbness and tingling to Right foot now scheduled for L4-L5 Posterior Lumbar Interbody Fusion on 9/20/18.  She curently denies gait instability, loss of bowel or bladder function. (13 Sep 2018 13:51)    PROCEDURE: 9/20 s/p l4-l5 translaminar interbody fusion POD#5     PLAN:  -Neuro: neuro stable, Dilaudid for pain control, flexeril for muscle spasm.  -Encouraged incentive spirometry  -Colace, senna for bowel regimen.  -Regular diet.  -Will remove hmv  -DVT ppx: sql and venodynes  -PT/OT: outpatient PT     Will discuss above with Dr. Sugar Hughes #[id number]

## 2018-09-25 NOTE — PROGRESS NOTE ADULT - SUBJECTIVE AND OBJECTIVE BOX
SUBJECTIVE: Patient seen and examined at bedside. Denies any complaints at this time.     OVERNIGHT EVENTS: none     Vital signs:   T(C): 36.8 (25 Sep 2018 09:35), Max: 37.2 (24 Sep 2018 15:42)  T(F): 98.2 (25 Sep 2018 09:35), Max: 98.9 (24 Sep 2018 15:42)  HR: 82 (25 Sep 2018 09:35) (70 - 88)  BP: 99/69 (25 Sep 2018 09:35) (97/74 - 129/70)  BP(mean): --  RR: 18 (25 Sep 2018 09:35) (18 - 18)  SpO2: 95% (25 Sep 2018 09:35) (94% - 100%)    PHYSICAL EXAM:    General: No Acute Distress     Neurological: Awake, alert oriented to person, place and time, Following Commands, PERRL, EOMI, Face Symmetrical, Speech Fluent, Moving all extremities, Muscle Strength normal in all four extremities, No Drift, Sensation to Light Touch Intact    Pulmonary: Clear to Auscultation, No Rales, No Rhonchi, No Wheezes     Cardiovascular: S1, S2, Regular Rate and Rhythm     Gastrointestinal: Soft, Nontender, Nondistended     Incision: clean and dry     LABS: no new labs       DRAINS: hemovac (50 cc)     DIET: regular     IMAGING: no new imagining today

## 2018-09-25 NOTE — PROGRESS NOTE ADULT - REASON FOR ADMISSION
Patient was admitted with back pain radiating to bilateral legs.
L4-L5 TLIF
Patient was admitted with back pain radiating to bilateral legs.
back surgery

## 2018-09-28 ENCOUNTER — OTHER (OUTPATIENT)
Age: 33
End: 2018-09-28

## 2018-09-28 VITALS
SYSTOLIC BLOOD PRESSURE: 110 MMHG | DIASTOLIC BLOOD PRESSURE: 77 MMHG | HEIGHT: 65 IN | BODY MASS INDEX: 35.65 KG/M2 | RESPIRATION RATE: 15 BRPM | WEIGHT: 214 LBS | TEMPERATURE: 98.6 F | OXYGEN SATURATION: 99 % | HEART RATE: 93 BPM

## 2018-09-28 DIAGNOSIS — R68.89 OTHER GENERAL SYMPTOMS AND SIGNS: ICD-10-CM

## 2018-09-28 PROBLEM — R20.2 PARESTHESIA OF SKIN: Chronic | Status: ACTIVE | Noted: 2018-09-13

## 2018-09-28 PROBLEM — M51.26 OTHER INTERVERTEBRAL DISC DISPLACEMENT, LUMBAR REGION: Chronic | Status: ACTIVE | Noted: 2018-09-13

## 2018-10-05 ENCOUNTER — MEDICATION RENEWAL (OUTPATIENT)
Age: 33
End: 2018-10-05

## 2018-10-05 ENCOUNTER — APPOINTMENT (OUTPATIENT)
Dept: RADIOLOGY | Facility: CLINIC | Age: 33
End: 2018-10-05
Payer: MEDICAID

## 2018-10-05 ENCOUNTER — OUTPATIENT (OUTPATIENT)
Dept: OUTPATIENT SERVICES | Facility: HOSPITAL | Age: 33
LOS: 1 days | End: 2018-10-05
Payer: MEDICAID

## 2018-10-05 DIAGNOSIS — Z98.890 OTHER SPECIFIED POSTPROCEDURAL STATES: Chronic | ICD-10-CM

## 2018-10-05 DIAGNOSIS — E89.0 POSTPROCEDURAL HYPOTHYROIDISM: Chronic | ICD-10-CM

## 2018-10-05 DIAGNOSIS — Z98.890 OTHER SPECIFIED POSTPROCEDURAL STATES: ICD-10-CM

## 2018-10-05 PROCEDURE — 72100 X-RAY EXAM L-S SPINE 2/3 VWS: CPT | Mod: 26

## 2018-10-05 PROCEDURE — 72100 X-RAY EXAM L-S SPINE 2/3 VWS: CPT

## 2018-10-10 ENCOUNTER — APPOINTMENT (OUTPATIENT)
Dept: SPINE | Facility: CLINIC | Age: 33
End: 2018-10-10
Payer: MEDICAID

## 2018-10-10 VITALS
SYSTOLIC BLOOD PRESSURE: 114 MMHG | HEIGHT: 65 IN | WEIGHT: 214 LBS | BODY MASS INDEX: 35.65 KG/M2 | DIASTOLIC BLOOD PRESSURE: 70 MMHG

## 2018-10-10 PROCEDURE — 99024 POSTOP FOLLOW-UP VISIT: CPT

## 2018-10-11 ENCOUNTER — OUTPATIENT (OUTPATIENT)
Dept: OUTPATIENT SERVICES | Facility: HOSPITAL | Age: 33
LOS: 1 days | End: 2018-10-11
Payer: MEDICAID

## 2018-10-11 ENCOUNTER — APPOINTMENT (OUTPATIENT)
Dept: ULTRASOUND IMAGING | Facility: HOSPITAL | Age: 33
End: 2018-10-11

## 2018-10-11 DIAGNOSIS — M54.16 RADICULOPATHY, LUMBAR REGION: ICD-10-CM

## 2018-10-11 DIAGNOSIS — Z98.890 OTHER SPECIFIED POSTPROCEDURAL STATES: Chronic | ICD-10-CM

## 2018-10-11 DIAGNOSIS — M51.26 OTHER INTERVERTEBRAL DISC DISPLACEMENT, LUMBAR REGION: ICD-10-CM

## 2018-10-11 DIAGNOSIS — E89.0 POSTPROCEDURAL HYPOTHYROIDISM: Chronic | ICD-10-CM

## 2018-10-11 PROCEDURE — 93970 EXTREMITY STUDY: CPT

## 2018-10-11 PROCEDURE — 93970 EXTREMITY STUDY: CPT | Mod: 26

## 2018-10-12 ENCOUNTER — MEDICATION RENEWAL (OUTPATIENT)
Age: 33
End: 2018-10-12

## 2018-10-12 ENCOUNTER — OTHER (OUTPATIENT)
Age: 33
End: 2018-10-12

## 2018-10-12 ENCOUNTER — RX RENEWAL (OUTPATIENT)
Age: 33
End: 2018-10-12

## 2018-10-12 DIAGNOSIS — R11.0 NAUSEA: ICD-10-CM

## 2018-10-12 RX ORDER — ONDANSETRON 4 MG/1
4 TABLET ORAL
Qty: 30 | Refills: 0 | Status: DISCONTINUED | COMMUNITY
Start: 2018-10-10 | End: 2018-10-12

## 2018-11-05 ENCOUNTER — RX RENEWAL (OUTPATIENT)
Age: 33
End: 2018-11-05

## 2018-11-09 ENCOUNTER — APPOINTMENT (OUTPATIENT)
Dept: RADIOLOGY | Facility: CLINIC | Age: 33
End: 2018-11-09
Payer: MEDICAID

## 2018-11-09 ENCOUNTER — OUTPATIENT (OUTPATIENT)
Dept: OUTPATIENT SERVICES | Facility: HOSPITAL | Age: 33
LOS: 1 days | End: 2018-11-09
Payer: MEDICAID

## 2018-11-09 DIAGNOSIS — Z98.890 OTHER SPECIFIED POSTPROCEDURAL STATES: Chronic | ICD-10-CM

## 2018-11-09 DIAGNOSIS — M51.26 OTHER INTERVERTEBRAL DISC DISPLACEMENT, LUMBAR REGION: ICD-10-CM

## 2018-11-09 DIAGNOSIS — E89.0 POSTPROCEDURAL HYPOTHYROIDISM: Chronic | ICD-10-CM

## 2018-11-09 PROCEDURE — 72110 X-RAY EXAM L-2 SPINE 4/>VWS: CPT

## 2018-11-09 PROCEDURE — 72110 X-RAY EXAM L-2 SPINE 4/>VWS: CPT | Mod: 26

## 2018-11-14 ENCOUNTER — APPOINTMENT (OUTPATIENT)
Dept: SPINE | Facility: CLINIC | Age: 33
End: 2018-11-14
Payer: MEDICAID

## 2018-11-14 VITALS
WEIGHT: 214 LBS | BODY MASS INDEX: 35.65 KG/M2 | SYSTOLIC BLOOD PRESSURE: 115 MMHG | HEIGHT: 65 IN | DIASTOLIC BLOOD PRESSURE: 70 MMHG

## 2018-11-14 PROCEDURE — 99024 POSTOP FOLLOW-UP VISIT: CPT

## 2018-11-14 RX ORDER — HYDROMORPHONE HYDROCHLORIDE 4 MG/1
4 TABLET ORAL
Qty: 28 | Refills: 0 | Status: COMPLETED | COMMUNITY
Start: 2018-10-05 | End: 2018-11-14

## 2018-11-14 RX ORDER — ONDANSETRON 4 MG/1
4 TABLET, ORALLY DISINTEGRATING ORAL EVERY 8 HOURS
Qty: 21 | Refills: 0 | Status: COMPLETED | COMMUNITY
Start: 2018-10-05 | End: 2018-11-14

## 2018-11-14 RX ORDER — CYCLOBENZAPRINE HYDROCHLORIDE 5 MG/1
5 TABLET, FILM COATED ORAL
Qty: 30 | Refills: 0 | Status: COMPLETED | COMMUNITY
Start: 2018-10-05 | End: 2018-11-14

## 2018-11-14 RX ORDER — PROCHLORPERAZINE MALEATE 5 MG/1
5 TABLET ORAL EVERY 6 HOURS
Qty: 28 | Refills: 0 | Status: COMPLETED | COMMUNITY
Start: 2018-10-12 | End: 2018-11-14

## 2018-11-14 RX ORDER — CEPHALEXIN 750 MG/1
750 CAPSULE ORAL
Qty: 10 | Refills: 0 | Status: COMPLETED | COMMUNITY
Start: 2018-10-03 | End: 2018-11-14

## 2018-11-14 RX ORDER — CYCLOBENZAPRINE HYDROCHLORIDE 5 MG/1
5 TABLET, FILM COATED ORAL 3 TIMES DAILY
Qty: 90 | Refills: 0 | Status: COMPLETED | COMMUNITY
Start: 2018-10-10 | End: 2018-11-14

## 2018-12-26 PROCEDURE — 83735 ASSAY OF MAGNESIUM: CPT

## 2018-12-26 PROCEDURE — 97161 PT EVAL LOW COMPLEX 20 MIN: CPT

## 2018-12-26 PROCEDURE — 83036 HEMOGLOBIN GLYCOSYLATED A1C: CPT

## 2018-12-26 PROCEDURE — 81025 URINE PREGNANCY TEST: CPT

## 2018-12-26 PROCEDURE — 82962 GLUCOSE BLOOD TEST: CPT

## 2018-12-26 PROCEDURE — 80076 HEPATIC FUNCTION PANEL: CPT

## 2018-12-26 PROCEDURE — 97530 THERAPEUTIC ACTIVITIES: CPT

## 2018-12-26 PROCEDURE — 97116 GAIT TRAINING THERAPY: CPT

## 2018-12-26 PROCEDURE — 76000 FLUOROSCOPY <1 HR PHYS/QHP: CPT

## 2018-12-26 PROCEDURE — 81003 URINALYSIS AUTO W/O SCOPE: CPT

## 2018-12-26 PROCEDURE — C1889: CPT

## 2018-12-26 PROCEDURE — C1713: CPT

## 2018-12-26 PROCEDURE — 80048 BASIC METABOLIC PNL TOTAL CA: CPT

## 2018-12-26 PROCEDURE — 85027 COMPLETE CBC AUTOMATED: CPT

## 2019-01-25 ENCOUNTER — OTHER (OUTPATIENT)
Age: 34
End: 2019-01-25

## 2019-01-25 ENCOUNTER — RESULT CHARGE (OUTPATIENT)
Age: 34
End: 2019-01-25

## 2019-01-29 ENCOUNTER — RX RENEWAL (OUTPATIENT)
Age: 34
End: 2019-01-29

## 2019-02-14 ENCOUNTER — TRANSCRIPTION ENCOUNTER (OUTPATIENT)
Age: 34
End: 2019-02-14

## 2019-02-15 ENCOUNTER — APPOINTMENT (OUTPATIENT)
Dept: RADIOLOGY | Facility: IMAGING CENTER | Age: 34
End: 2019-02-15
Payer: MEDICAID

## 2019-02-15 ENCOUNTER — OUTPATIENT (OUTPATIENT)
Dept: OUTPATIENT SERVICES | Facility: HOSPITAL | Age: 34
LOS: 1 days | End: 2019-02-15
Payer: MEDICAID

## 2019-02-15 DIAGNOSIS — E89.0 POSTPROCEDURAL HYPOTHYROIDISM: Chronic | ICD-10-CM

## 2019-02-15 DIAGNOSIS — Z98.890 OTHER SPECIFIED POSTPROCEDURAL STATES: Chronic | ICD-10-CM

## 2019-02-15 DIAGNOSIS — M51.26 OTHER INTERVERTEBRAL DISC DISPLACEMENT, LUMBAR REGION: ICD-10-CM

## 2019-02-15 PROCEDURE — 72100 X-RAY EXAM L-S SPINE 2/3 VWS: CPT | Mod: 26

## 2019-02-15 PROCEDURE — 72100 X-RAY EXAM L-S SPINE 2/3 VWS: CPT

## 2019-02-19 ENCOUNTER — APPOINTMENT (OUTPATIENT)
Dept: SPINE | Facility: CLINIC | Age: 34
End: 2019-02-19
Payer: MEDICAID

## 2019-02-19 VITALS
WEIGHT: 215 LBS | HEIGHT: 62 IN | DIASTOLIC BLOOD PRESSURE: 79 MMHG | RESPIRATION RATE: 14 BRPM | SYSTOLIC BLOOD PRESSURE: 120 MMHG | HEART RATE: 65 BPM | OXYGEN SATURATION: 97 % | BODY MASS INDEX: 39.56 KG/M2

## 2019-02-19 PROCEDURE — 99213 OFFICE O/P EST LOW 20 MIN: CPT

## 2019-02-22 NOTE — ASSESSMENT
[FreeTextEntry1] : \par Assess:\par Lumbar stenosis\par \par PLAN:\par 6 months with x -ray of lumbar spine\par Return in August 2019 \par

## 2019-02-22 NOTE — REASON FOR VISIT
[Follow-Up: _____] : a [unfilled] follow-up visit [Other: _____] : [unfilled] [FreeTextEntry1] : 33 year old with lower back pain and electively underwent a lumbar fusion .  She has no c/o and is working and returned back to her prior activities.  Her pain is significantly improved

## 2019-05-03 NOTE — ASU PREOP CHECKLIST - PATIENT'S PERSONAL PROPERTY GIVEN TO
Xray at bedside.       Shelbi Mcdonough RN  10/14/18 2204 See Dr Mejía in 2 weeks and bring a new chest X-ray when you come.  Watch for pus or fevers or increased redness and if noted, call Dr Mejía. See Dr Mejía next Tuesday 5/ 14 at [facility] office with new chest xray. Call to make an apt. [phone].  Watch for pus or fevers or increased redness and if noted, call Dr Mejía. See Dr Mejía next Tuesday 5/ 14 at [facility] office with new chest xray. Call to make an apt. [phone].  Watch for pus or fevers or increased redness and if noted, call Dr Mejía.  See your Cardiologist Dr. Mejía in 2-3 weeks  See PCP for Diabetes management, but can see Endrocrinologist at some point if needed  Take new DM meds and check blood sugars as instructed. family member

## 2019-06-10 ENCOUNTER — OTHER (OUTPATIENT)
Age: 34
End: 2019-06-10

## 2019-06-13 ENCOUNTER — TRANSCRIPTION ENCOUNTER (OUTPATIENT)
Age: 34
End: 2019-06-13

## 2019-06-14 ENCOUNTER — APPOINTMENT (OUTPATIENT)
Dept: MRI IMAGING | Facility: CLINIC | Age: 34
End: 2019-06-14

## 2019-06-18 ENCOUNTER — APPOINTMENT (OUTPATIENT)
Dept: SPINE | Facility: CLINIC | Age: 34
End: 2019-06-18

## 2019-07-02 ENCOUNTER — APPOINTMENT (OUTPATIENT)
Dept: SPINE | Facility: CLINIC | Age: 34
End: 2019-07-02

## 2019-07-03 ENCOUNTER — TRANSCRIPTION ENCOUNTER (OUTPATIENT)
Age: 34
End: 2019-07-03

## 2019-07-08 ENCOUNTER — OUTPATIENT (OUTPATIENT)
Dept: OUTPATIENT SERVICES | Facility: HOSPITAL | Age: 34
LOS: 1 days | End: 2019-07-08
Payer: MEDICAID

## 2019-07-08 ENCOUNTER — APPOINTMENT (OUTPATIENT)
Dept: MRI IMAGING | Facility: IMAGING CENTER | Age: 34
End: 2019-07-08
Payer: MEDICAID

## 2019-07-08 DIAGNOSIS — Z98.890 OTHER SPECIFIED POSTPROCEDURAL STATES: ICD-10-CM

## 2019-07-08 DIAGNOSIS — M51.26 OTHER INTERVERTEBRAL DISC DISPLACEMENT, LUMBAR REGION: ICD-10-CM

## 2019-07-08 DIAGNOSIS — Z98.890 OTHER SPECIFIED POSTPROCEDURAL STATES: Chronic | ICD-10-CM

## 2019-07-08 DIAGNOSIS — E89.0 POSTPROCEDURAL HYPOTHYROIDISM: Chronic | ICD-10-CM

## 2019-07-08 PROCEDURE — 72148 MRI LUMBAR SPINE W/O DYE: CPT

## 2019-07-08 PROCEDURE — 72148 MRI LUMBAR SPINE W/O DYE: CPT | Mod: 26

## 2019-07-16 ENCOUNTER — APPOINTMENT (OUTPATIENT)
Dept: SPINE | Facility: CLINIC | Age: 34
End: 2019-07-16
Payer: MEDICAID

## 2019-07-16 VITALS
SYSTOLIC BLOOD PRESSURE: 108 MMHG | OXYGEN SATURATION: 100 % | WEIGHT: 215 LBS | HEART RATE: 63 BPM | DIASTOLIC BLOOD PRESSURE: 75 MMHG | HEIGHT: 62 IN | BODY MASS INDEX: 39.56 KG/M2 | RESPIRATION RATE: 14 BRPM | TEMPERATURE: 98.2 F

## 2019-07-16 PROCEDURE — 99213 OFFICE O/P EST LOW 20 MIN: CPT

## 2019-07-25 NOTE — CONSULT LETTER
[Dear  ___] : Dear  [unfilled], [Sincerely,] : Sincerely, [FreeTextEntry2] : Mirna Carrington MD\par [address] [address] [FreeTextEntry3] : Carla Wooten,DO\par [facility] [place]\par [address]\[place] [address]\par Phone: [phone]\par Fax: [phone]\par

## 2019-07-25 NOTE — REASON FOR VISIT
[Other: _____] : [unfilled] [FreeTextEntry1] : Ms Banegas is well known to the office for lumbar stenosis and underwent a PLIF in September 2018.  She recovered well and has diminished but not resolved lower back pain.  Today she c/o left foot contractures and left leg pain.  In addition she has numbness of her buttock areas and tightness of her left leg and toes.

## 2019-07-25 NOTE — ASSESSMENT
[FreeTextEntry1] : Assess:\par S/P PLIF at 10 month follow up\par Lower back pain improved but not resolved\par Left leg and foot tightness with foot cotnractures \par MRI of lumbar spine reveals hardware intact and no fractures and no compression of the neural elements\par \par PLAN: \par Lumbar x ray AP and lateral in three months - October 2019\par Ordered PT \par No lifting , bending, twisting

## 2019-07-25 NOTE — REVIEW OF SYSTEMS
[Negative] : Heme/Lymph [de-identified] : lower back pain, buttock pain, left leg and left foot pain. left foot with tightness and contractures  [FreeTextEntry1] : No bowel or bladder symptoms

## 2019-10-10 ENCOUNTER — TRANSCRIPTION ENCOUNTER (OUTPATIENT)
Age: 34
End: 2019-10-10

## 2019-10-15 ENCOUNTER — APPOINTMENT (OUTPATIENT)
Dept: SPINE | Facility: CLINIC | Age: 34
End: 2019-10-15

## 2019-12-04 ENCOUNTER — INPATIENT (INPATIENT)
Facility: HOSPITAL | Age: 34
LOS: 4 days | Discharge: HOME | End: 2019-12-09
Attending: INTERNAL MEDICINE | Admitting: INTERNAL MEDICINE
Payer: MEDICAID

## 2019-12-04 VITALS
TEMPERATURE: 97 F | SYSTOLIC BLOOD PRESSURE: 114 MMHG | HEART RATE: 65 BPM | DIASTOLIC BLOOD PRESSURE: 65 MMHG | OXYGEN SATURATION: 100 % | RESPIRATION RATE: 16 BRPM

## 2019-12-04 DIAGNOSIS — E89.0 POSTPROCEDURAL HYPOTHYROIDISM: Chronic | ICD-10-CM

## 2019-12-04 DIAGNOSIS — Z98.890 OTHER SPECIFIED POSTPROCEDURAL STATES: Chronic | ICD-10-CM

## 2019-12-04 NOTE — ED ADULT TRIAGE NOTE - CHIEF COMPLAINT QUOTE
Pt comes in for c/o pain to lower back in L4/L5 region. Pt states that she has hx of L4/L5 spinal fusion last year September and began having pain yesterday worsening today. Pt reports that she has pain running up and down L leg and L side back pain worse than the right. Pt appears uncomfortable in triage, vs as noted

## 2019-12-05 DIAGNOSIS — M51.26 OTHER INTERVERTEBRAL DISC DISPLACEMENT, LUMBAR REGION: ICD-10-CM

## 2019-12-05 DIAGNOSIS — M54.16 RADICULOPATHY, LUMBAR REGION: ICD-10-CM

## 2019-12-05 DIAGNOSIS — K21.9 GASTRO-ESOPHAGEAL REFLUX DISEASE WITHOUT ESOPHAGITIS: ICD-10-CM

## 2019-12-05 DIAGNOSIS — R20.2 PARESTHESIA OF SKIN: ICD-10-CM

## 2019-12-05 DIAGNOSIS — C73 MALIGNANT NEOPLASM OF THYROID GLAND: ICD-10-CM

## 2019-12-05 LAB
ALBUMIN SERPL ELPH-MCNC: 3.8 G/DL — SIGNIFICANT CHANGE UP (ref 3.3–5)
ALP SERPL-CCNC: 57 U/L — SIGNIFICANT CHANGE UP (ref 40–120)
ALT FLD-CCNC: 20 U/L — SIGNIFICANT CHANGE UP (ref 4–33)
ANION GAP SERPL CALC-SCNC: 12 MMO/L — SIGNIFICANT CHANGE UP (ref 7–14)
APTT BLD: 28 SEC — SIGNIFICANT CHANGE UP (ref 27.5–36.3)
AST SERPL-CCNC: 19 U/L — SIGNIFICANT CHANGE UP (ref 4–32)
BASOPHILS # BLD AUTO: 0.03 K/UL — SIGNIFICANT CHANGE UP (ref 0–0.2)
BASOPHILS NFR BLD AUTO: 0.3 % — SIGNIFICANT CHANGE UP (ref 0–2)
BILIRUB SERPL-MCNC: < 0.2 MG/DL — LOW (ref 0.2–1.2)
BLD GP AB SCN SERPL QL: NEGATIVE — SIGNIFICANT CHANGE UP
BUN SERPL-MCNC: 8 MG/DL — SIGNIFICANT CHANGE UP (ref 7–23)
CALCIUM SERPL-MCNC: 8.8 MG/DL — SIGNIFICANT CHANGE UP (ref 8.4–10.5)
CHLORIDE SERPL-SCNC: 102 MMOL/L — SIGNIFICANT CHANGE UP (ref 98–107)
CO2 SERPL-SCNC: 24 MMOL/L — SIGNIFICANT CHANGE UP (ref 22–31)
CREAT SERPL-MCNC: 0.76 MG/DL — SIGNIFICANT CHANGE UP (ref 0.5–1.3)
EOSINOPHIL # BLD AUTO: 0.14 K/UL — SIGNIFICANT CHANGE UP (ref 0–0.5)
EOSINOPHIL NFR BLD AUTO: 1.4 % — SIGNIFICANT CHANGE UP (ref 0–6)
GLUCOSE SERPL-MCNC: 94 MG/DL — SIGNIFICANT CHANGE UP (ref 70–99)
HCG SERPL-ACNC: < 5 MIU/ML — SIGNIFICANT CHANGE UP
HCT VFR BLD CALC: 38.9 % — SIGNIFICANT CHANGE UP (ref 34.5–45)
HGB BLD-MCNC: 12.2 G/DL — SIGNIFICANT CHANGE UP (ref 11.5–15.5)
IMM GRANULOCYTES NFR BLD AUTO: 0.3 % — SIGNIFICANT CHANGE UP (ref 0–1.5)
INR BLD: 0.96 — SIGNIFICANT CHANGE UP (ref 0.88–1.17)
LYMPHOCYTES # BLD AUTO: 4.42 K/UL — HIGH (ref 1–3.3)
LYMPHOCYTES # BLD AUTO: 42.9 % — SIGNIFICANT CHANGE UP (ref 13–44)
MAGNESIUM SERPL-MCNC: 1.9 MG/DL — SIGNIFICANT CHANGE UP (ref 1.6–2.6)
MCHC RBC-ENTMCNC: 27.4 PG — SIGNIFICANT CHANGE UP (ref 27–34)
MCHC RBC-ENTMCNC: 31.4 % — LOW (ref 32–36)
MCV RBC AUTO: 87.2 FL — SIGNIFICANT CHANGE UP (ref 80–100)
MONOCYTES # BLD AUTO: 0.53 K/UL — SIGNIFICANT CHANGE UP (ref 0–0.9)
MONOCYTES NFR BLD AUTO: 5.1 % — SIGNIFICANT CHANGE UP (ref 2–14)
NEUTROPHILS # BLD AUTO: 5.15 K/UL — SIGNIFICANT CHANGE UP (ref 1.8–7.4)
NEUTROPHILS NFR BLD AUTO: 50 % — SIGNIFICANT CHANGE UP (ref 43–77)
NRBC # FLD: 0 K/UL — SIGNIFICANT CHANGE UP (ref 0–0)
PHOSPHATE SERPL-MCNC: 3.6 MG/DL — SIGNIFICANT CHANGE UP (ref 2.5–4.5)
PLATELET # BLD AUTO: 239 K/UL — SIGNIFICANT CHANGE UP (ref 150–400)
PMV BLD: 9.5 FL — SIGNIFICANT CHANGE UP (ref 7–13)
POTASSIUM SERPL-MCNC: 3.8 MMOL/L — SIGNIFICANT CHANGE UP (ref 3.5–5.3)
POTASSIUM SERPL-SCNC: 3.8 MMOL/L — SIGNIFICANT CHANGE UP (ref 3.5–5.3)
PROT SERPL-MCNC: 6.9 G/DL — SIGNIFICANT CHANGE UP (ref 6–8.3)
PROTHROM AB SERPL-ACNC: 10.9 SEC — SIGNIFICANT CHANGE UP (ref 9.8–13.1)
RBC # BLD: 4.46 M/UL — SIGNIFICANT CHANGE UP (ref 3.8–5.2)
RBC # FLD: 14.3 % — SIGNIFICANT CHANGE UP (ref 10.3–14.5)
RH IG SCN BLD-IMP: POSITIVE — SIGNIFICANT CHANGE UP
SODIUM SERPL-SCNC: 138 MMOL/L — SIGNIFICANT CHANGE UP (ref 135–145)
WBC # BLD: 10.3 K/UL — SIGNIFICANT CHANGE UP (ref 3.8–10.5)
WBC # FLD AUTO: 10.3 K/UL — SIGNIFICANT CHANGE UP (ref 3.8–10.5)

## 2019-12-05 PROCEDURE — 72131 CT LUMBAR SPINE W/O DYE: CPT | Mod: 26

## 2019-12-05 PROCEDURE — 72158 MRI LUMBAR SPINE W/O & W/DYE: CPT | Mod: 26

## 2019-12-05 RX ORDER — INFLUENZA VIRUS VACCINE 15; 15; 15; 15 UG/.5ML; UG/.5ML; UG/.5ML; UG/.5ML
0.5 SUSPENSION INTRAMUSCULAR ONCE
Refills: 0 | Status: COMPLETED | OUTPATIENT
Start: 2019-12-05 | End: 2019-12-09

## 2019-12-05 RX ORDER — SIMETHICONE 80 MG/1
80 TABLET, CHEWABLE ORAL ONCE
Refills: 0 | Status: COMPLETED | OUTPATIENT
Start: 2019-12-05 | End: 2019-12-05

## 2019-12-05 RX ORDER — ACETAMINOPHEN 500 MG
650 TABLET ORAL EVERY 6 HOURS
Refills: 0 | Status: COMPLETED | OUTPATIENT
Start: 2019-12-05 | End: 2019-12-07

## 2019-12-05 RX ORDER — MORPHINE SULFATE 50 MG/1
4 CAPSULE, EXTENDED RELEASE ORAL ONCE
Refills: 0 | Status: DISCONTINUED | OUTPATIENT
Start: 2019-12-05 | End: 2019-12-05

## 2019-12-05 RX ORDER — ACETAMINOPHEN 500 MG
975 TABLET ORAL ONCE
Refills: 0 | Status: COMPLETED | OUTPATIENT
Start: 2019-12-05 | End: 2019-12-05

## 2019-12-05 RX ORDER — LIDOCAINE 4 G/100G
1 CREAM TOPICAL EVERY 24 HOURS
Refills: 0 | Status: DISCONTINUED | OUTPATIENT
Start: 2019-12-05 | End: 2019-12-09

## 2019-12-05 RX ORDER — SIMETHICONE 80 MG/1
80 TABLET, CHEWABLE ORAL EVERY 8 HOURS
Refills: 0 | Status: DISCONTINUED | OUTPATIENT
Start: 2019-12-05 | End: 2019-12-09

## 2019-12-05 RX ORDER — KETOROLAC TROMETHAMINE 30 MG/ML
15 SYRINGE (ML) INJECTION EVERY 6 HOURS
Refills: 0 | Status: DISCONTINUED | OUTPATIENT
Start: 2019-12-05 | End: 2019-12-07

## 2019-12-05 RX ORDER — HYDROMORPHONE HYDROCHLORIDE 2 MG/ML
2 INJECTION INTRAMUSCULAR; INTRAVENOUS; SUBCUTANEOUS ONCE
Refills: 0 | Status: DISCONTINUED | OUTPATIENT
Start: 2019-12-05 | End: 2019-12-05

## 2019-12-05 RX ORDER — DEXAMETHASONE 0.5 MG/5ML
10 ELIXIR ORAL ONCE
Refills: 0 | Status: COMPLETED | OUTPATIENT
Start: 2019-12-05 | End: 2019-12-05

## 2019-12-05 RX ORDER — DEXAMETHASONE 0.5 MG/5ML
4 ELIXIR ORAL EVERY 6 HOURS
Refills: 0 | Status: DISCONTINUED | OUTPATIENT
Start: 2019-12-05 | End: 2019-12-08

## 2019-12-05 RX ORDER — DIAZEPAM 5 MG
5 TABLET ORAL ONCE
Refills: 0 | Status: DISCONTINUED | OUTPATIENT
Start: 2019-12-05 | End: 2019-12-05

## 2019-12-05 RX ORDER — GABAPENTIN 400 MG/1
300 CAPSULE ORAL THREE TIMES A DAY
Refills: 0 | Status: DISCONTINUED | OUTPATIENT
Start: 2019-12-05 | End: 2019-12-09

## 2019-12-05 RX ORDER — HYDROMORPHONE HYDROCHLORIDE 2 MG/ML
0.5 INJECTION INTRAMUSCULAR; INTRAVENOUS; SUBCUTANEOUS ONCE
Refills: 0 | Status: DISCONTINUED | OUTPATIENT
Start: 2019-12-05 | End: 2019-12-05

## 2019-12-05 RX ORDER — OXYCODONE HYDROCHLORIDE 5 MG/1
5 TABLET ORAL EVERY 4 HOURS
Refills: 0 | Status: DISCONTINUED | OUTPATIENT
Start: 2019-12-05 | End: 2019-12-09

## 2019-12-05 RX ORDER — HYDROMORPHONE HYDROCHLORIDE 2 MG/ML
1 INJECTION INTRAMUSCULAR; INTRAVENOUS; SUBCUTANEOUS ONCE
Refills: 0 | Status: DISCONTINUED | OUTPATIENT
Start: 2019-12-05 | End: 2019-12-05

## 2019-12-05 RX ORDER — CYCLOBENZAPRINE HYDROCHLORIDE 10 MG/1
5 TABLET, FILM COATED ORAL THREE TIMES A DAY
Refills: 0 | Status: COMPLETED | OUTPATIENT
Start: 2019-12-05 | End: 2019-12-08

## 2019-12-05 RX ORDER — HYDROMORPHONE HYDROCHLORIDE 2 MG/ML
0.5 INJECTION INTRAMUSCULAR; INTRAVENOUS; SUBCUTANEOUS DAILY
Refills: 0 | Status: DISCONTINUED | OUTPATIENT
Start: 2019-12-05 | End: 2019-12-05

## 2019-12-05 RX ADMIN — GABAPENTIN 300 MILLIGRAM(S): 400 CAPSULE ORAL at 21:20

## 2019-12-05 RX ADMIN — OXYCODONE HYDROCHLORIDE 5 MILLIGRAM(S): 5 TABLET ORAL at 17:42

## 2019-12-05 RX ADMIN — HYDROMORPHONE HYDROCHLORIDE 0.5 MILLIGRAM(S): 2 INJECTION INTRAMUSCULAR; INTRAVENOUS; SUBCUTANEOUS at 13:09

## 2019-12-05 RX ADMIN — HYDROMORPHONE HYDROCHLORIDE 1 MILLIGRAM(S): 2 INJECTION INTRAMUSCULAR; INTRAVENOUS; SUBCUTANEOUS at 08:33

## 2019-12-05 RX ADMIN — Medication 5 MILLIGRAM(S): at 01:26

## 2019-12-05 RX ADMIN — HYDROMORPHONE HYDROCHLORIDE 0.5 MILLIGRAM(S): 2 INJECTION INTRAMUSCULAR; INTRAVENOUS; SUBCUTANEOUS at 11:32

## 2019-12-05 RX ADMIN — MORPHINE SULFATE 4 MILLIGRAM(S): 50 CAPSULE, EXTENDED RELEASE ORAL at 02:20

## 2019-12-05 RX ADMIN — Medication 15 MILLIGRAM(S): at 18:20

## 2019-12-05 RX ADMIN — GABAPENTIN 300 MILLIGRAM(S): 400 CAPSULE ORAL at 18:19

## 2019-12-05 RX ADMIN — Medication 4 MILLIGRAM(S): at 15:08

## 2019-12-05 RX ADMIN — CYCLOBENZAPRINE HYDROCHLORIDE 5 MILLIGRAM(S): 10 TABLET, FILM COATED ORAL at 16:49

## 2019-12-05 RX ADMIN — SIMETHICONE 80 MILLIGRAM(S): 80 TABLET, CHEWABLE ORAL at 09:16

## 2019-12-05 RX ADMIN — MORPHINE SULFATE 4 MILLIGRAM(S): 50 CAPSULE, EXTENDED RELEASE ORAL at 01:03

## 2019-12-05 RX ADMIN — LIDOCAINE 1 PATCH: 4 CREAM TOPICAL at 16:48

## 2019-12-05 RX ADMIN — HYDROMORPHONE HYDROCHLORIDE 1 MILLIGRAM(S): 2 INJECTION INTRAMUSCULAR; INTRAVENOUS; SUBCUTANEOUS at 06:23

## 2019-12-05 RX ADMIN — Medication 975 MILLIGRAM(S): at 12:14

## 2019-12-05 RX ADMIN — LIDOCAINE 1 PATCH: 4 CREAM TOPICAL at 20:02

## 2019-12-05 RX ADMIN — OXYCODONE HYDROCHLORIDE 5 MILLIGRAM(S): 5 TABLET ORAL at 16:48

## 2019-12-05 RX ADMIN — Medication 15 MILLIGRAM(S): at 19:03

## 2019-12-05 RX ADMIN — Medication 102 MILLIGRAM(S): at 01:29

## 2019-12-05 RX ADMIN — SIMETHICONE 80 MILLIGRAM(S): 80 TABLET, CHEWABLE ORAL at 15:08

## 2019-12-05 RX ADMIN — HYDROMORPHONE HYDROCHLORIDE 0.5 MILLIGRAM(S): 2 INJECTION INTRAMUSCULAR; INTRAVENOUS; SUBCUTANEOUS at 10:25

## 2019-12-05 RX ADMIN — Medication 4 MILLIGRAM(S): at 21:20

## 2019-12-05 RX ADMIN — HYDROMORPHONE HYDROCHLORIDE 2 MILLIGRAM(S): 2 INJECTION INTRAMUSCULAR; INTRAVENOUS; SUBCUTANEOUS at 04:20

## 2019-12-05 RX ADMIN — SIMETHICONE 80 MILLIGRAM(S): 80 TABLET, CHEWABLE ORAL at 18:20

## 2019-12-05 RX ADMIN — HYDROMORPHONE HYDROCHLORIDE 0.5 MILLIGRAM(S): 2 INJECTION INTRAMUSCULAR; INTRAVENOUS; SUBCUTANEOUS at 12:14

## 2019-12-05 RX ADMIN — Medication 975 MILLIGRAM(S): at 13:09

## 2019-12-05 NOTE — ED ADULT NURSE REASSESSMENT NOTE - NS ED NURSE REASSESS COMMENT FT1
Pt received from break coverage RN. Pt received A&Ox4, non-ambulatory due to pain. Pt resting comfortably. Respiration even and non-labored. Will premedicate before MRI

## 2019-12-05 NOTE — CONSULT NOTE ADULT - PROBLEM SELECTOR RECOMMENDATION 9
Decadron 10 mg IV X 1 then 4mg Q6H  Admit CDU for evaluation and imaging  Noncontrast CT L-S spine  MRI With and without contrast  analgesics and antispasmodics as needed

## 2019-12-05 NOTE — ED PROVIDER NOTE - OBJECTIVE STATEMENT
34F PMH Irregular Menstruation, Acid Reflux Thyroid CA s/p partial thyroidectomy 2017, Herniated nucleus pulposus s/p microdiscectomy (11/2016), s/p L4-L5 Posterior Lumbar Interbody Fusion on 9/20/18, presenting with lower back pain. 34F PMH Irregular Menstruation, Acid Reflux Thyroid CA s/p partial thyroidectomy 2017, Herniated nucleus pulposus s/p microdiscectomy (11/2016), s/p L4-L5 Posterior Lumbar Interbody Fusion on 9/20/18, presenting with lower back pain.    Patient reports she was in the shower today and bent down to grab shampoo. As she was getting back up, she reports she experienced lumbar back pain associated with left lumbar radiculopathic shooting pain all the way down the left foot, described as a shooting/tingling pain. She endorses left foot numbness from ankle down. She denies saddle anesthesia, denies urinary/fecal incontinence, denies nausea/vomiting. Pain is 10/10. Takes tylenol and occasionally muscle relaxant and percocet prescribed by Dr. Wooten who performed her spinal fusion procedure; has not recently required significant pain meds. Has been pain free for some time prior to this isolated event.

## 2019-12-05 NOTE — ED ADULT NURSE NOTE - OBJECTIVE STATEMENT
Break Coverage RN: Received pt in room 10, A&Ox4, appears uncomfortable, respirations even and unlabored b/l. Pt c/o lower back pain rad to left leg with left foot numbness/pain. 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 Abdomen soft, nondistended, nontender. IVL 20g Angiocath placed on right AC. Labs sent. Awaiting MD bailey. Will continue to monitor.

## 2019-12-05 NOTE — PATIENT PROFILE ADULT - FALL HARM RISK TYPE OF ASSESSMENT
[facility]  [address], [address]  [place] [address]  [phone]        May 25, 2018    Lynda Cohen  [address]   [place] [address]            Dear Ms. Lynda JIM Cohen:      We recently received a request from your pharmacy requesting a refill of your birth control pills.    A review of your chart indicates that you will be due for an appointment in June.  Please contact our office at [phone] to schedule an office visit.    You will need this appointment for future refills on your medication. Your medication was refilled for 3 month (s).    Taking care of your health is important to us and ongoing visits with your provider are vital to your care.  We look forward to seeing you in the near future.          Sincerely,      Verito Sanchez MD  
admission

## 2019-12-05 NOTE — ED PROVIDER NOTE - CLINICAL SUMMARY MEDICAL DECISION MAKING FREE TEXT BOX
34F PMH Irregular Menstruation, Acid Reflux Thyroid CA s/p partial thyroidectomy 2017, Herniated nucleus pulposus s/p microdiscectomy (11/2016), s/p L4-L5 Posterior Lumbar Interbody Fusion on 9/20/18, presenting with lower back pain, with lumbar radiculopathic pain also with left foot numbness, no saddle anesthesia, no fecal/urinary incontinence but given foot numbness will give decadron to cover for r/o cauda equina. Consulted neurosurgery will get CT lumbosacral spine no contrast to evaluate hardware and mr LS spine w/ and w/o contrast to r/o cauda equina/nerve impingement. 34F PMH Irregular Menstruation, Acid Reflux Thyroid CA s/p partial thyroidectomy 2017, Herniated nucleus pulposus s/p microdiscectomy (11/2016), s/p L4-L5 Posterior Lumbar Interbody Fusion on 9/20/18, presenting with lower back pain, with lumbar radiculopathic pain also with left foot numbness, no saddle anesthesia, no fecal/urinary incontinence but given foot numbness will give decadron to cover for r/o cauda equina. Consulted neurosurgery will get CT lumbosacral spine no contrast to evaluate hardware and mr LS spine w/ and w/o contrast to r/o cauda equina/nerve impingement; gave 10 of decadron to cover for cauda equina although MR with no cauda equina on prelim read per radiology resident; will hold off on giving any more decadron per now per neurosurgery; will admit to medicine for further management of pain and inability to walk will need PT eval and further management per neurosurgery; must f/u CT LS spine to evaluate fusion hardware. Paulo att: 33 yo F PMH Irregular Menstruation, Acid Reflux Thyroid CA s/p partial thyroidectomy 2017, Herniated nucleus pulposus s/p microdiscectomy (11/2016), s/p L4-L5 Posterior Lumbar Interbody Fusion on 9/20/18, presenting with lower back pain, with lumbar radiculopathic pain also with left foot numbness, no saddle anesthesia, no fecal/urinary incontinence but given foot numbness will give decadron to cover for r/o cauda equina. Consulted neurosurgery will get CT lumbosacral spine no contrast to evaluate hardware and mr LS spine w/ and w/o contrast to r/o cauda equina/nerve impingement; gave 10 of decadron to cover for cauda equina although MR with no cauda equina on prelim read per radiology resident; will hold off on giving any more decadron per now per neurosurgery; will admit to medicine for further management of pain and inability to walk will need PT eval and further management per neurosurgery; must f/u CT LS spine to evaluate fusion hardware.

## 2019-12-05 NOTE — PROVIDER CONTACT NOTE (OTHER) - REASON
Patient complains of Pain 9/9 in LLE, back , and hip region; Patient is a new admit to unit; H&P incomplete

## 2019-12-05 NOTE — ED PROVIDER NOTE - PHYSICAL EXAMINATION
General: No acute distress.  HEENT: NCAT.  PERRL.  EOMI.  No scleral icterus or injection.  Moist MM.  No oropharyngeal exudates.    Neck: Supple.  Full ROM.  No JVD.  No thyromegaly. No lymphadenopathy.   Heart: RRR.  Normal S1 and S2.  No murmurs, rubs, or gallops.   Lungs: CTAB. No wheezes, crackles, or rhonchi.    Abdomen: BS+, soft, NT/ND.  No organomegaly.  Skin: Warm and dry.  No rashes.  Extremities: No edema, clubbing, or cyanosis.  2+ peripheral pulses b/l.  Musculoskeletal: spinal tenderness to palpation in lumbosacral region. Tenderness to palpation of left paraspinal muscles. Reduced sensation of left foot below the level of the ankle. Impaired passive and active ROM of left lower extremity 2/2 pain. No saddle anesthesia. RLE WNL  Neuro: A&Ox3.  CN II-XII intact.  5/5 strength in UE and LE b/l.  Tactile sensation intact in UE and LE b/l.  Cerebellar function intact

## 2019-12-05 NOTE — H&P ADULT - NSICDXPASTMEDICALHX_GEN_ALL_CORE_FT
PAST MEDICAL HISTORY:  Acid reflux     Herniated nucleus pulposus, L4-5 H/O microdiscectomy  11/2016    Irregular menstruation     Lumbar herniated disc     Paresthesia of right leg     Thyroid cancer s/p partial thyroidectomy 2017

## 2019-12-05 NOTE — PROVIDER CONTACT NOTE (OTHER) - ACTION/TREATMENT ORDERED:
Give another dose of 0.5mg of dilaudid and Tylenol 975mg
Give another dose of 0.5mg of dilaudid and Tylenol 975mg

## 2019-12-05 NOTE — H&P ADULT - HISTORY OF PRESENT ILLNESS
34F PMH Irregular Menstruation, Acid Reflux Thyroid CA s/p partial thyroidectomy 2017, Herniated nucleus pulposus s/p microdiscectomy (11/2016), s/p L4-L5 Posterior Lumbar Interbody Fusion on 9/20/18, presenting with lower back pain.    Patient reports she was in the shower today and bent down to grab shampoo. As she was getting back up, she reports she experienced lumbar back pain associated with left lumbar radiculopathic shooting pain all the way down the left foot, described as a shooting/tingling pain. She endorses left foot numbness from ankle down. She denies saddle anesthesia, denies urinary/fecal incontinence, denies nausea/vomiting. Pain is 10/10. Takes tylenol and occasionally muscle relaxant and percocet prescribed by Dr. Wooten who performed her spinal fusion procedure; has not recently required significant pain meds. Has been pain free for some time prior to this isolated event. Patient states current symptoms are similar to those prior to her fusion in 2018    at present she is unable to ambulate and having numbness of left foot

## 2019-12-05 NOTE — H&P ADULT - NSHPLABSRESULTS_GEN_ALL_CORE
12.2   10.30 )-----------( 239      ( 05 Dec 2019 00:50 )             38.9     12-05    138  |  102  |  8   ----------------------------<  94  3.8   |  24  |  0.76    Ca    8.8      05 Dec 2019 00:50  Phos  3.6     12-05  Mg     1.9     12-05    TPro  6.9  /  Alb  3.8  /  TBili  < 0.2<L>  /  DBili  x   /  AST  19  /  ALT  20  /  AlkPhos  57  12-05

## 2019-12-05 NOTE — ED ADULT NURSE REASSESSMENT NOTE - NS ED NURSE REASSESS COMMENT FT1
Pt received from break coverage RN. Pt A&Ox4, with 20G Iv in right antecubital. Pt resting comfortably. Respiration even and non-labored

## 2019-12-05 NOTE — CONSULT NOTE ADULT - SUBJECTIVE AND OBJECTIVE BOX
34F PMH Irregular Menstruation, Acid Reflux Thyroid CA s/p partial thyroidectomy 2017, Herniated nucleus pulposus s/p microdiscectomy (11/2016), s/p L4-L5 Posterior Lumbar Interbody Fusion on 9/20/18, presenting with lower back pain.    	Patient reports she was in the shower today and bent down to grab shampoo. As she was getting back up, she reports she experienced lumbar back pain associated with left lumbar radiculopathic shooting pain all the way down the left foot, described as a shooting/tingling pain. She endorses left foot numbness from ankle down. She denies saddle anesthesia, denies urinary/fecal incontinence, denies nausea/vomiting. Pain is 10/10. Takes tylenol and occasionally muscle relaxant and percocet prescribed by Dr. Wooten who performed her spinal fusion procedure; has not recently required significant pain meds. Has been pain free for some time prior to this isolated event. Patient states current symptoms are similar to those prior to her fusion in 2018    WDWN female in mild distress due to pain  Vital Signs Last 24 Hrs  T(C): 36.3 (04 Dec 2019 21:56), Max: 36.3 (04 Dec 2019 21:56)  T(F): 97.3 (04 Dec 2019 21:56), Max: 97.3 (04 Dec 2019 21:56)  HR: 61 (05 Dec 2019 01:05) (61 - 65)  BP: 118/71 (05 Dec 2019 01:05) (114/65 - 118/71)  BP(mean): --  RR: 16 (05 Dec 2019 01:05) (16 - 16)  SpO2: 100% (05 Dec 2019 01:05) (100% - 100%)    AAO X 3  PERRLA, EOMI  CN 2-12 grossly intact  LAKHANI, Bilateral UE, right LE 5/5, Left LE limited due to pain  Left HF/HE, KF/KE 4/5 with pain, PF/DF 2/5 with pain  Left LE hyperesthesia

## 2019-12-05 NOTE — ED ADULT NURSE NOTE - NSIMPLEMENTINTERV_GEN_ALL_ED
Implemented All Fall Risk Interventions:  [place] to call system. Call bell, personal items and telephone within reach. Instruct patient to call for assistance. Room bathroom lighting operational. Non-slip footwear when patient is off stretcher. Physically safe environment: no spills, clutter or unnecessary equipment. Stretcher in lowest position, wheels locked, appropriate side rails in place. Provide visual cue, wrist band, yellow gown, etc. Monitor gait and stability. Monitor for mental status changes and reorient to person, place, and time. Review medications for side effects contributing to fall risk. Reinforce activity limits and safety measures with patient and family.

## 2019-12-05 NOTE — H&P ADULT - PROBLEM SELECTOR PLAN 1
pt. had L3-L4 fusion  surgery last year , says she is off pain meds for long time , occ aleve   -now sudden onset of sever back pain associated with numbness of left LE / foot and unable to ambulate   -seen by neurosurgery in ED : recommend starting pain meds , muscle relaxant and decadron  -consulted neurology Dr. Hassan

## 2019-12-05 NOTE — ED PROVIDER NOTE - NS ED ROS FT
REVIEW OF SYSTEMS:    CONSTITUTIONAL: No weakness, fevers or chills  EYES/ENT: No visual changes;  No vertigo or throat pain   NECK: No pain or stiffness  RESPIRATORY: No cough, wheezing, hemoptysis; No shortness of breath  CARDIOVASCULAR: No chest pain or palpitations  GASTROINTESTINAL: No abdominal pain; nausea, vomiting;  diarrhea or constipation. No hemetemesis, melena or hematochezia.  GENITOURINARY: No dysuria, frequency or hematuria  NEUROLOGICAL: +lumbar radiculopathic pain in left leg, RLE WNL. Loss of sensation in left foot below the ankle.   SKIN: No itching, burning, rashes, or lesions

## 2019-12-05 NOTE — H&P ADULT - NSHPPHYSICALEXAM_GEN_ALL_CORE
pt. seen and examined, in moderate pain     Vital Signs Last 24 Hrs  T(C): 36.7 (05 Dec 2019 15:38), Max: 36.8 (05 Dec 2019 15:13)  T(F): 98 (05 Dec 2019 15:38), Max: 98.3 (05 Dec 2019 15:13)  HR: 71 (05 Dec 2019 15:38) (61 - 80)  BP: 118/66 (05 Dec 2019 15:38) (104/70 - 118/71)  BP(mean): --  RR: 19 (05 Dec 2019 15:38) (16 - 19)  SpO2: 98% (05 Dec 2019 15:38) (96% - 100%)    heent: nc/at  neck: supple, no JVD  lungs: B/L clear, no w/r/r  heart: s1s2 nml  abd: soft, NABS, NT/ND  ext: no e/c/c , c/o tenderness in back when left foot is moved   neuro: lower dilcia pain , c/o numbess in left foot and difficulty ambulation

## 2019-12-06 ENCOUNTER — TRANSCRIPTION ENCOUNTER (OUTPATIENT)
Age: 34
End: 2019-12-06

## 2019-12-06 RX ORDER — PANTOPRAZOLE SODIUM 20 MG/1
40 TABLET, DELAYED RELEASE ORAL
Refills: 0 | Status: DISCONTINUED | OUTPATIENT
Start: 2019-12-06 | End: 2019-12-09

## 2019-12-06 RX ORDER — ONDANSETRON 8 MG/1
4 TABLET, FILM COATED ORAL ONCE
Refills: 0 | Status: COMPLETED | OUTPATIENT
Start: 2019-12-06 | End: 2019-12-06

## 2019-12-06 RX ORDER — TIZANIDINE 4 MG/1
2 TABLET ORAL
Refills: 0 | Status: DISCONTINUED | OUTPATIENT
Start: 2019-12-06 | End: 2019-12-09

## 2019-12-06 RX ADMIN — SIMETHICONE 80 MILLIGRAM(S): 80 TABLET, CHEWABLE ORAL at 12:24

## 2019-12-06 RX ADMIN — Medication 650 MILLIGRAM(S): at 13:10

## 2019-12-06 RX ADMIN — GABAPENTIN 300 MILLIGRAM(S): 400 CAPSULE ORAL at 14:45

## 2019-12-06 RX ADMIN — Medication 650 MILLIGRAM(S): at 00:00

## 2019-12-06 RX ADMIN — SIMETHICONE 80 MILLIGRAM(S): 80 TABLET, CHEWABLE ORAL at 00:04

## 2019-12-06 RX ADMIN — Medication 15 MILLIGRAM(S): at 18:26

## 2019-12-06 RX ADMIN — Medication 4 MILLIGRAM(S): at 14:45

## 2019-12-06 RX ADMIN — Medication 4 MILLIGRAM(S): at 21:23

## 2019-12-06 RX ADMIN — Medication 15 MILLIGRAM(S): at 23:51

## 2019-12-06 RX ADMIN — GABAPENTIN 300 MILLIGRAM(S): 400 CAPSULE ORAL at 21:24

## 2019-12-06 RX ADMIN — PANTOPRAZOLE SODIUM 40 MILLIGRAM(S): 20 TABLET, DELAYED RELEASE ORAL at 12:25

## 2019-12-06 RX ADMIN — Medication 650 MILLIGRAM(S): at 12:26

## 2019-12-06 RX ADMIN — Medication 15 MILLIGRAM(S): at 12:27

## 2019-12-06 RX ADMIN — Medication 650 MILLIGRAM(S): at 23:51

## 2019-12-06 RX ADMIN — GABAPENTIN 300 MILLIGRAM(S): 400 CAPSULE ORAL at 06:13

## 2019-12-06 RX ADMIN — Medication 650 MILLIGRAM(S): at 18:26

## 2019-12-06 RX ADMIN — ONDANSETRON 4 MILLIGRAM(S): 8 TABLET, FILM COATED ORAL at 02:40

## 2019-12-06 RX ADMIN — Medication 15 MILLIGRAM(S): at 13:10

## 2019-12-06 RX ADMIN — LIDOCAINE 1 PATCH: 4 CREAM TOPICAL at 05:16

## 2019-12-06 RX ADMIN — Medication 4 MILLIGRAM(S): at 02:24

## 2019-12-06 RX ADMIN — Medication 4 MILLIGRAM(S): at 09:29

## 2019-12-06 RX ADMIN — ONDANSETRON 4 MILLIGRAM(S): 8 TABLET, FILM COATED ORAL at 12:24

## 2019-12-06 RX ADMIN — Medication 650 MILLIGRAM(S): at 01:00

## 2019-12-06 RX ADMIN — Medication 15 MILLIGRAM(S): at 06:13

## 2019-12-06 RX ADMIN — TIZANIDINE 2 MILLIGRAM(S): 4 TABLET ORAL at 21:30

## 2019-12-06 RX ADMIN — Medication 15 MILLIGRAM(S): at 00:30

## 2019-12-06 RX ADMIN — Medication 15 MILLIGRAM(S): at 00:00

## 2019-12-06 NOTE — PHYSICAL THERAPY INITIAL EVALUATION ADULT - PERTINENT HX OF CURRENT PROBLEM, REHAB EVAL
Pt. is a 34 year old female admitted to [facility] with lumbar radiculopathy. PMH: thyroid cancer.

## 2019-12-06 NOTE — DISCHARGE NOTE PROVIDER - NSDCACTIVITY_GEN_ALL_CORE
No restrictions No restrictions/Walking - Outdoors allowed/Walking - Indoors allowed/Showering allowed

## 2019-12-06 NOTE — CONSULT NOTE ADULT - SUBJECTIVE AND OBJECTIVE BOX
[facility] Neurological [facility]        Patient is a 34y old  Female who presents with a chief complaint of sudden on set of back pain , unable to ambulate (06 Dec 2019 15:21)    Excerpt from H&P,34F PMH Irregular Menstruation, Acid Reflux Thyroid CA s/p partial thyroidectomy , Herniated nucleus pulposus s/p microdiscectomy (2016), s/p L4-L5 Posterior Lumbar Interbody Fusion on 18, presenting with lower back pain.    Patient reports she was in the shower today and bent down to grab shampoo. As she was getting back up, she reports she experienced lumbar back pain associated with left lumbar radiculopathic shooting pain all the way down the left foot, described as a shooting/tingling pain. She endorses left foot numbness from ankle down. She denies saddle anesthesia, denies urinary/fecal incontinence, denies nausea/vomiting. Pain is 10/10. Takes tylenol and occasionally muscle relaxant and percocet prescribed by Dr. Wooten who performed her spinal fusion procedure; has not recently required significant pain meds. Has been pain free for some time prior to this isolated event. Patient states current symptoms are similar to those prior to her fusion in 2018    at present she is unable to ambulate and having numbness of left foot (05 Dec 2019 15:39)           *****PAST MEDICAL / Surgical  HISTORY:  PAST MEDICAL & SURGICAL HISTORY:  Paresthesia of right leg  Lumbar herniated disc  Acid reflux  Irregular menstruation  Thyroid cancer: s/p partial thyroidectomy 2017  Herniated nucleus pulposus, L4-5: H/O microdiscectomy  2016  S/P partial thyroidectomy: 2017  H/O microdiscectomy: 2016           *****FAMILY HISTORY:  FAMILY HISTORY:  Family history of thyroid cancer           *****SOCIAL HISTORY:  Alcohol: None  Smoking: None         *****ALLERGIES:   Allergies    tramadol (Pruritus)  Voltaren (Flushing; Urticaria; Rash; Swelling)  Voltaren (Pruritus; Hives)    Intolerances             *****MEDICATIONS: current medication reviewed and documented.   MEDICATIONS  (STANDING):  acetaminophen   Tablet .. 650 milliGRAM(s) Oral every 6 hours  dexAMETHasone  Injectable 4 milliGRAM(s) IV Push every 6 hours  gabapentin 300 milliGRAM(s) Oral three times a day  influenza   Vaccine 0.5 milliLiter(s) IntraMuscular once  ketorolac   Injectable 15 milliGRAM(s) IV Push every 6 hours  lidocaine   Patch 1 Patch Transdermal every 24 hours  pantoprazole    Tablet 40 milliGRAM(s) Oral before breakfast    MEDICATIONS  (PRN):  cyclobenzaprine 5 milliGRAM(s) Oral three times a day PRN Mild to Moderate pain  oxyCODONE    IR 5 milliGRAM(s) Oral every 4 hours PRN Severe Pain (7 - 10)  simethicone 80 milliGRAM(s) Chew every 8 hours PRN Gas           *****REVIEW OF SYSTEM:  GEN: no fever, no chills, no pain  RESP: no SOB, no cough, no sputum  CVS: no chest pain, no palpitations, no edema  GI: no abdominal pain, no nausea, no vomiting, no constipation, no diarrhea  : no dysurea, no frequency, no hematurea  Neuro: no headache, no dizziness  PSYCH: no anxiety, no depression  Derm : no itching, no rash         *****VITAL SIGNS:  T(C): 36.7 (19 @ 14:12), Max: 36.7 (19 @ 15:38)  HR: 72 (19 @ 14:12) (71 - 88)  BP: 128/67 (19 @ 14:12) (108/66 - 128/67)  RR: 18 (19 @ 14:12) (18 - 19)  SpO2: 100% (19 @ 14:12) (98% - 100%)  Wt(kg): --           *****PHYSICAL EXAM:   Alert oriented x 3   Attention comprehension are fair. Able to name, repeat, read without any difficulty.   Able to follow 3 step commands.     EOMI fundi not visualized,  VFF to confrontration  No facial asymmetry   Tongue is midline   Palate elevates symmetrically   Moving all 4 ext symmetrically   limited evaluation of the left lower extremity due to pain.   Limited effort      sensation is altered on the left side in a patchy distribution   no clear sensory level   pt able to ambulate independently      B/L down going toes               *****LAB AND IMAGIN.2   10.30 )-----------( 239      ( 05 Dec 2019 00:50 )             38.9                   138  |  102  |  8   ----------------------------<  94  3.8   |  24  |  0.76    Ca    8.8      05 Dec 2019 00:50  Phos  3.6     12-  Mg     1.9     -    TPro  6.9  /  Alb  3.8  /  TBili  < 0.2<L>  /  DBili  x   /  AST  19  /  ALT  20  /  AlkPhos  57  12-    PT/INR - ( 05 Dec 2019 00:50 )   PT: 10.9 SEC;   INR: 0.96          PTT - ( 05 Dec 2019 00:50 )  PTT:28.0 SEC            < from: CT Lumbar Spine No Cont (19 @ 07:16) >  No acute fractures or dislocation seen.    Previously noted area of abnormal signal involving the left epidural   space at the L4-5 level is not seen on this study and is likely beyond   the resolution of this study.    Impression: Postop changes as described above    Evaluation of the L4-5 level is limited by metallic artifact.      < end of copied text >            < from: MR Lumbar Spine w/wo IV Cont (19 @ 03:38) >    On series  image 26 there is evidence of abnormal decreased signal   seen involving the left epidural space. This is identified at the L4-5   level. This finding could be compatible with a piece of ligamental flavum   though the possibility of a sequestered disc or even artifact (less   likely) cannot be entirely excluded. This finding does appear to be   abutting the left L5 nerve root. This finding does appear to be present   on the prior study.    < end of copied text >      < from: MR Lumbar Spine w/wo IV Cont (19 @ 03:38) >  L2-3: Bilateral hypertrophic facet joint changes seen. Mild to moderate   narrowing of theright neural foramen and mild narrowing of the spinal   canal.    L3-4: Disc bulge and bilateral hypertrophic facet joint changes seen.   Mild narrowing of the spinal canal is seen. Mild to moderate narrowing of   both neural foramen    L4-5: Disc osteophyte complex is seen. Bilateral hypertrophic facet joint   changes are seen. Area of abnormal signal involving the left epidural   space as described above. These findings cause mild to moderate narrowing   of the spinal canal which is more prominent on the left side. Mild   narrowing of the right neural foramen is seen.    L5-S1: Disc bulge and bilateral hypertrophic facet joint changes seen.   Mild to moderate narrowing of the spinal canal. Moderate narrowing of   both neural foramen.    Theconus ends at the bottom of L1 and appears normal.    < end of copied text >    [All pertinent recent Imaging reports reviewed]         *****A S S E S S M E N T   A N D   P L A N :     Excerpt from H&P,34F PMH Irregular Menstruation, Acid Reflux Thyroid CA s/p partial thyroidectomy , Herniated nucleus pulposus s/p microdiscectomy (2016), s/p L4-L5 Posterior Lumbar Interbody Fusion on 18, presenting with lower back pain.    Patient reports she was in the shower today and bent down to grab shampoo. As she was getting back up, she reports she experienced lumbar back pain associated with left lumbar radiculopathic shooting pain all the way down the left foot, described as a shooting/tingling pain. She endorses left foot numbness from ankle down. She denies saddle anesthesia, denies urinary/fecal incontinence, denies nausea/vomiting. Pain is 10/10. Takes tylenol and occasionally muscle relaxant and percocet prescribed by Dr. Wooten who performed her spinal fusion procedure; has not recently required significant pain meds. Has been pain free for some time prior to this isolated event. Patient states current symptoms are similar to those prior to her fusion in 2018    at present she is unable to ambulate and having numbness of left foot     Problem/Recommendations 1:back pain radiculopathy without incontinence or saddle anesthesia  neurosurgery input appreciated.   mri l spine as above evidence of multi level degenerative changes.   ? mri l spine with contrast would be beneficial   defer to neurosurgery for further management   ct l spine was not helpful    gabapentin 200 bid   tizanidine 2mg bid for pain control.      ___________________________  Will follow with you.  Thank you,  Nicolasa Hassan MD  Diplomate of the American Board of Neurology and Psychiatry.  Diplomate of the American Board of Vascular Neurology.   [facility]rological Care ([facility]), [facility]   Ph: [phone]    Differential diagnosis and plan of care discussed with patient after the evaluation.   Advanced care planning options discussed.   Pain assessed and judicious use of narcotics when appropriate was discussed.  Importance of Fall prevention discussed.  Counseling on Smoking and Alcohol cessation was offered when appropriate.  Counseling on Diet, exercise, and medication compliance was done.   83 minutes spent on the total encounter;  more than 50 % of the visit was spent on counseling  and or coordinating care by the attending physician.    Thank you for allowing me to participate in the care of this olya patient. Please do not hesitate to call me if you have any questions.     This and subsequent notes were partially created using voice recognition software and will  inherently be subject to errors including those of syntax and sound alike substitutions which may escape proofreading. In such instances original meaning may be extrapolated by contextual derivation.

## 2019-12-06 NOTE — DISCHARGE NOTE PROVIDER - NSDCCPCAREPLAN_GEN_ALL_CORE_FT
PRINCIPAL DISCHARGE DIAGNOSIS  Diagnosis: Lumbar radiculopathy, acute  Assessment and Plan of Treatment:   There is evidence of abnormal decreased signal seen involving the left   epidural space on both T1 and T2-weighted sequence. This is seen at the   L4-5 level and does appear to contact the left L5 nerve root.  Continue steroids as prescribed and follow up with Neurosurgery as outpatient within 2 weeks of discharge for further medical management. PRINCIPAL DISCHARGE DIAGNOSIS  Diagnosis: Lumbar radiculopathy, acute  Assessment and Plan of Treatment: Continue steroids as prescribed and follow up with Neurosurgery as outpatient within 2 weeks of discharge for further medical management.      SECONDARY DISCHARGE DIAGNOSES  Diagnosis: Acid reflux  Assessment and Plan of Treatment: continue Protonix PRINCIPAL DISCHARGE DIAGNOSIS  Diagnosis: Lumbar radiculopathy, acute  Assessment and Plan of Treatment: MRI and CT were done-You have been evaluated by neurosx. No acute neurosurgical intervention indicated at this time.  Continue steroids as prescribed and follow up with Neurosurgery  Dr. Wooten as outpatient within 2 weeks of discharge for further medical management.      SECONDARY DISCHARGE DIAGNOSES  Diagnosis: Acid reflux  Assessment and Plan of Treatment: continue Protonix    Diagnosis: Acid reflux  Assessment and Plan of Treatment: continue Protonix

## 2019-12-06 NOTE — CHART NOTE - NSCHARTNOTEFT_GEN_A_CORE
34F PMH Thyroid CA s/p partial thyroidectomy 2017, Herniated nucleus pulposus s/p microdiscectomy (11/2016), s/p L4-L5 Posterior Lumbar Interbody Fusion on 9/20/18, presenting with lower back pain. MRI and CT reviewed. No acute neurosurgical intervention indicated at this time. Pt can follow up as outpatient with Dr. Wooten. Case d/w Dr. Wooten    < from: MR Lumbar Spine w/wo IV Cont (12.05.19 @ 03:38) >  Impression: Postop changes again seen as described above.  There is evidence of abnormal decreased signal seen involving the left   epidural space on both T1 and T2-weighted sequence. This is seen at the   L4-5 level and does appear to contact the left L5 nerve root.  < end of copied text >      < from: CT Lumbar Spine No Cont (12.05.19 @ 07:16) >  Postop changes compatible with a laminectomy is seen at the L4-5 level   with evidence ofdiscectomy and posterior spinal fusion as well. The   right L4 pedicle screw does extend slightly beyond the border of the   anterior vertebral body. Osseous material in the postop region appears   adequately placed.  Please note evaluation of the L4-5 level is limited by metallic artifact.  Loss of the normal lumbar lordosis seen.  Mild scoliosis seen.  No acute fractures or dislocation seen.  Previously noted area of abnormal signal involving the left epidural   space at the L4-5 level is not seen on this study and is likely beyond   the resolution of this study.    Impression: Postop changes as described above  Evaluation of the L4-5 level is limited by metallic artifact.  < end of copied text >

## 2019-12-06 NOTE — DISCHARGE NOTE PROVIDER - HOSPITAL COURSE
34F PMH Irregular Menstruation, Acid Reflux Thyroid CA s/p partial thyroidectomy 2017, Herniated nucleus pulposus s/p microdiscectomy (11/2016), s/p L4-L5 Posterior Lumbar Interbody Fusion on 9/20/18, presenting with lower back pain. Admited for lumbar radiculopathy            Hospital COurse:            ·  Problem: Lumbar radiculopathy, acute.  Plan: pt. had L3-L4 fusion  surgery last year , says she is off pain meds for long time , occ aleve     -now sudden onset of sever back pain associated with numbness of left LE / foot and unable to ambulate     -seen by neurosurgery in ED : recommend starting pain meds , muscle relaxant and decadron    -consulted neurology Dr. Hassan.     -MRI-    There is evidence of abnormal decreased signal seen involving the left     epidural space on both T1 and T2-weighted sequence. This is seen at the     L4-5 level and does appear to contact the left L5 nerve root.            ·  Problem: Herniated nucleus pulposus, L4-5.  Plan: CT and MRI done     -f/u with neuro surgery.             ·  Problem: Paresthesia of right leg.  Plan: 2/2 back problem     -once pain better , will need PT.             ·  Problem: Thyroid cancer.  Plan: Hx of surgery     -stable.             ·  Problem: Acid reflux.  Plan: stable     -c/w protonix. 34F PMH Irregular Menstruation, Acid Reflux Thyroid CA s/p partial thyroidectomy 2017, Herniated nucleus pulposus s/p microdiscectomy (11/2016), s/p L4-L5 Posterior Lumbar Interbody Fusion on 9/20/18, presenting with lower back pain. Admited for lumbar radiculopathy            Hospital Course:        Lumbar radiculopathy, acute.  Plan: pt. had L3-L4 fusion  surgery last year , says she is off pain meds for long time , occ aleve     -now sudden onset of sever back pain associated with numbness of left LE / foot and unable to ambulate     -seen by neurosurgery in ED : recommend starting pain meds , muscle relaxant and decadron    -consulted neurology Dr. Hassan.     -MRI-There is evidence of abnormal decreased signal seen involving the left     epidural space on both T1 and T2-weighted sequence. This is seen at the     L4-5 level and does appear to contact the left L5 nerve root.    -Pt managed with Neurontin, Flexeril, OxyIR, and Decadron.            Herniated nucleus pulposus, L4-5.  Plan: CT and MRI done     -f/u with neuro surgery.         Paresthesia of right leg.  Plan: 2/2 back problem     -once pain better , will need PT.             Thyroid cancer.  Plan: Hx of surgery     -stable.             Acid reflux.  Plan: stable     -c/w protonix. 34F PMH Irregular Menstruation, Acid Reflux Thyroid CA s/p partial thyroidectomy 2017, Herniated nucleus pulposus s/p microdiscectomy (11/2016), s/p L4-L5 Posterior Lumbar Interbody Fusion on 9/20/18, presenting with lower back pain. Admited for lumbar radiculopathy            Hospital Course:    MRI-There is evidence of abnormal decreased signal seen involving the left epidural space on both T1 and T2-weighted sequence. This is seen at the     L4-5 level and does appear to contact the left L5 nerve root. Pt managed with Neurontin, Flexeril, OxyIR, and Decadron.    Pt has been evaluated by neurosurgery. No acute surgical evaluation needed     Pt eval-rec- home no PT    Thyroid cancer, Hx of surgery     Acid reflux-c/w protonix.

## 2019-12-06 NOTE — PROGRESS NOTE ADULT - SUBJECTIVE AND OBJECTIVE BOX
Patient is a 34y old  Female who presents with a chief complaint of sudden on set of back pain , unable to ambulate (06 Dec 2019 15:21)    pt. seen and examined, feeling lot better , ambulating today     INTERVAL HPI/OVERNIGHT EVENTS:  T(C): 36.7 (12-06-19 @ 14:12), Max: 36.7 (12-06-19 @ 06:22)  HR: 72 (12-06-19 @ 14:12) (72 - 80)  BP: 128/67 (12-06-19 @ 14:12) (119/68 - 128/67)  RR: 18 (12-06-19 @ 14:12) (18 - 18)  SpO2: 100% (12-06-19 @ 14:12) (99% - 100%)  Wt(kg): --  I&O's Summary      PAST MEDICAL & SURGICAL HISTORY:  Paresthesia of right leg  Lumbar herniated disc  Acid reflux  Irregular menstruation  Thyroid cancer: s/p partial thyroidectomy 2017  Herniated nucleus pulposus, L4-5: H/O microdiscectomy  11/2016  S/P partial thyroidectomy: 2017  H/O microdiscectomy: 11/2016      SOCIAL HISTORY  Alcohol:  Tobacco:  Illicit substance use:    FAMILY HISTORY:    REVIEW OF SYSTEMS:  CONSTITUTIONAL: No fever, weight loss, or fatigue  EYES: No eye pain, visual disturbances, or discharge  ENMT:  No difficulty hearing, tinnitus, vertigo; No sinus or throat pain  NECK: No pain or stiffness  RESPIRATORY: No cough, wheezing, chills or hemoptysis; No shortness of breath  CARDIOVASCULAR: No chest pain, palpitations, dizziness, or leg swelling  GASTROINTESTINAL: No abdominal or epigastric pain. No nausea, vomiting, or hematemesis; No diarrhea or constipation. No melena or hematochezia.  GENITOURINARY: No dysuria, frequency, hematuria, or incontinence  NEUROLOGICAL: No headaches, memory loss, loss of strength, numbness, or tremors  SKIN: No itching, burning, rashes, or lesions   LYMPH NODES: No enlarged glands  ENDOCRINE: No heat or cold intolerance; No hair loss  MUSCULOSKELETAL: No joint pain or swelling; No muscle, back, or extremity pain  PSYCHIATRIC: No depression, anxiety, mood swings, or difficulty sleeping  HEME/LYMPH: No easy bruising, or bleeding gums  ALLERY AND IMMUNOLOGIC: No hives or eczema    RADIOLOGY & ADDITIONAL TESTS:    Imaging Personally Reviewed:  [ ] YES  [ ] NO    Consultant(s) Notes Reviewed:  [ ] YES  [ ] NO    PHYSICAL EXAM:  GENERAL: NAD, well-groomed, well-developed  HEAD:  Atraumatic, Normocephalic  EYES: EOMI, PERRLA, conjunctiva and sclera clear  ENMT: No tonsillar erythema, exudates, or enlargement; Moist mucous membranes, Good dentition, No lesions  NECK: Supple, No JVD, Normal thyroid  NERVOUS SYSTEM:  Alert & Oriented X3, Good concentration; Motor Strength 5/5 B/L upper and lower extremities; DTRs 2+ intact and symmetric  CHEST/LUNG: Clear to percussion bilaterally; No rales, rhonchi, wheezing, or rubs  HEART: Regular rate and rhythm; No murmurs, rubs, or gallops  ABDOMEN: Soft, Nontender, Nondistended; Bowel sounds present  EXTREMITIES:  2+ Peripheral Pulses, No clubbing, cyanosis, or edema  LYMPH: No lymphadenopathy noted  SKIN: No rashes or lesions    LABS:              CAPILLARY BLOOD GLUCOSE                MEDICATIONS  (STANDING):  acetaminophen   Tablet .. 650 milliGRAM(s) Oral every 6 hours  dexAMETHasone  Injectable 4 milliGRAM(s) IV Push every 6 hours  gabapentin 300 milliGRAM(s) Oral three times a day  influenza   Vaccine 0.5 milliLiter(s) IntraMuscular once  ketorolac   Injectable 15 milliGRAM(s) IV Push every 6 hours  lidocaine   Patch 1 Patch Transdermal every 24 hours  pantoprazole    Tablet 40 milliGRAM(s) Oral before breakfast  tiZANidine 2 milliGRAM(s) Oral <User Schedule>    MEDICATIONS  (PRN):  cyclobenzaprine 5 milliGRAM(s) Oral three times a day PRN Mild to Moderate pain  oxyCODONE    IR 5 milliGRAM(s) Oral every 4 hours PRN Severe Pain (7 - 10)  simethicone 80 milliGRAM(s) Chew every 8 hours PRN Gas      Care Discussed with Consultants/Other Providers [ ] YES  [ ] NO

## 2019-12-06 NOTE — PHYSICAL THERAPY INITIAL EVALUATION ADULT - ADDITIONAL COMMENTS
Pt. has 1 flight of steps at home with unilateral railing.     Pt. left seated in bathroom in [facility] and call bell in reach, RN aware.

## 2019-12-06 NOTE — DISCHARGE NOTE PROVIDER - CARE PROVIDER_API CALL
Cyrus Wooten (DO)  Neurological Surgery  [address] [place] [address], [address]  [place] [address]  Phone: [phone]  Fax: [phone]  Follow Up Time:

## 2019-12-06 NOTE — DISCHARGE NOTE PROVIDER - NSDCMRMEDTOKEN_GEN_ALL_CORE_FT
aluminum hydroxide-magnesium hydroxide 200 mg-200 mg/5 mL oral suspension: 30 milliliter(s) orally every 12 hours, As needed, Indigestion  cyclobenzaprine 5 mg oral tablet: 1 tab(s) orally 3 times a day MDD:3  docusate sodium 100 mg oral capsule: 1 cap(s) orally 3 times a day  gabapentin 300 mg oral capsule: 1 cap(s) orally 2 times a day  HYDROmorphone 4 mg oral tablet: 1 tab(s) orally every 4 hours, As needed, Severe Pain (7 - 10) MDD:6  ondansetron 4 mg oral tablet: 1 tab(s) orally every 8 hours, As needed, Nausea and/or Vomiting MDD:3  Rolling Walker: use as directed    dx: s/p spine surgery  senna oral tablet: 2 tab(s) orally once a day (at bedtime)  Shower Chair: use as directed    dx: s/p spine surgery  Tylenol 500 mg oral tablet: 2 tab(s) orally every 6 hours, As Needed - for moderate pain aluminum hydroxide-magnesium hydroxide 200 mg-200 mg/5 mL oral suspension: 30 milliliter(s) orally every 12 hours, As needed, Indigestion  docusate sodium 100 mg oral capsule: 1 cap(s) orally 3 times a day  gabapentin 300 mg oral capsule: 1 cap(s) orally 3 times a day   Medrol Dosepak 4 mg oral tablet: 1 kit orally once a day   ondansetron 4 mg oral tablet: 1 tab(s) orally every 8 hours, As needed, Nausea and/or Vomiting MDD:3  pantoprazole 40 mg oral delayed release tablet: 1 tab(s) orally once a day (before a meal)  senna oral tablet: 2 tab(s) orally once a day (at bedtime)  tiZANidine 2 mg oral tablet: 1 tab(s) orally once a day MDD:1 as needed for muscle spasm   Tylenol 500 mg oral tablet: 2 tab(s) orally every 6 hours, As Needed - for moderate pain

## 2019-12-07 RX ADMIN — Medication 650 MILLIGRAM(S): at 05:39

## 2019-12-07 RX ADMIN — GABAPENTIN 300 MILLIGRAM(S): 400 CAPSULE ORAL at 22:03

## 2019-12-07 RX ADMIN — SIMETHICONE 80 MILLIGRAM(S): 80 TABLET, CHEWABLE ORAL at 10:00

## 2019-12-07 RX ADMIN — Medication 4 MILLIGRAM(S): at 09:54

## 2019-12-07 RX ADMIN — SIMETHICONE 80 MILLIGRAM(S): 80 TABLET, CHEWABLE ORAL at 17:08

## 2019-12-07 RX ADMIN — OXYCODONE HYDROCHLORIDE 5 MILLIGRAM(S): 5 TABLET ORAL at 22:43

## 2019-12-07 RX ADMIN — OXYCODONE HYDROCHLORIDE 5 MILLIGRAM(S): 5 TABLET ORAL at 22:03

## 2019-12-07 RX ADMIN — Medication 4 MILLIGRAM(S): at 17:08

## 2019-12-07 RX ADMIN — OXYCODONE HYDROCHLORIDE 5 MILLIGRAM(S): 5 TABLET ORAL at 17:58

## 2019-12-07 RX ADMIN — OXYCODONE HYDROCHLORIDE 5 MILLIGRAM(S): 5 TABLET ORAL at 17:08

## 2019-12-07 RX ADMIN — TIZANIDINE 2 MILLIGRAM(S): 4 TABLET ORAL at 09:54

## 2019-12-07 RX ADMIN — OXYCODONE HYDROCHLORIDE 5 MILLIGRAM(S): 5 TABLET ORAL at 12:15

## 2019-12-07 RX ADMIN — Medication 15 MILLIGRAM(S): at 05:39

## 2019-12-07 RX ADMIN — TIZANIDINE 2 MILLIGRAM(S): 4 TABLET ORAL at 22:03

## 2019-12-07 RX ADMIN — GABAPENTIN 300 MILLIGRAM(S): 400 CAPSULE ORAL at 14:22

## 2019-12-07 RX ADMIN — Medication 4 MILLIGRAM(S): at 22:03

## 2019-12-07 RX ADMIN — LIDOCAINE 1 PATCH: 4 CREAM TOPICAL at 17:07

## 2019-12-07 RX ADMIN — LIDOCAINE 1 PATCH: 4 CREAM TOPICAL at 19:11

## 2019-12-07 RX ADMIN — CYCLOBENZAPRINE HYDROCHLORIDE 5 MILLIGRAM(S): 10 TABLET, FILM COATED ORAL at 09:56

## 2019-12-07 RX ADMIN — GABAPENTIN 300 MILLIGRAM(S): 400 CAPSULE ORAL at 05:39

## 2019-12-07 RX ADMIN — PANTOPRAZOLE SODIUM 40 MILLIGRAM(S): 20 TABLET, DELAYED RELEASE ORAL at 05:40

## 2019-12-07 RX ADMIN — Medication 4 MILLIGRAM(S): at 04:07

## 2019-12-07 RX ADMIN — OXYCODONE HYDROCHLORIDE 5 MILLIGRAM(S): 5 TABLET ORAL at 11:21

## 2019-12-07 NOTE — PROGRESS NOTE ADULT - SUBJECTIVE AND OBJECTIVE BOX
Patient is a 34y old  Female who presents with a chief complaint of sudden on set of back pain , unable to ambulate (06 Dec 2019 21:12)      INTERVAL HPI/OVERNIGHT EVENTS:  T(C): 36.6 (12-07-19 @ 14:07), Max: 36.9 (12-06-19 @ 21:45)  HR: 83 (12-07-19 @ 14:07) (70 - 84)  BP: 117/67 (12-07-19 @ 14:07) (108/68 - 134/74)  RR: 18 (12-07-19 @ 14:07) (18 - 18)  SpO2: 100% (12-07-19 @ 14:07) (100% - 100%)  Wt(kg): --  I&O's Summary      PAST MEDICAL & SURGICAL HISTORY:  Paresthesia of right leg  Lumbar herniated disc  Acid reflux  Irregular menstruation  Thyroid cancer: s/p partial thyroidectomy 2017  Herniated nucleus pulposus, L4-5: H/O microdiscectomy  11/2016  S/P partial thyroidectomy: 2017  H/O microdiscectomy: 11/2016      SOCIAL HISTORY  Alcohol:  Tobacco:  Illicit substance use:    FAMILY HISTORY:    REVIEW OF SYSTEMS:  CONSTITUTIONAL: No fever, weight loss, or fatigue  EYES: No eye pain, visual disturbances, or discharge  ENMT:  No difficulty hearing, tinnitus, vertigo; No sinus or throat pain  NECK: No pain or stiffness  RESPIRATORY: No cough, wheezing, chills or hemoptysis; No shortness of breath  CARDIOVASCULAR: No chest pain, palpitations, dizziness, or leg swelling  GASTROINTESTINAL: No abdominal or epigastric pain. No nausea, vomiting, or hematemesis; No diarrhea or constipation. No melena or hematochezia.  GENITOURINARY: No dysuria, frequency, hematuria, or incontinence  NEUROLOGICAL: No headaches, memory loss, loss of strength, numbness, or tremors  SKIN: No itching, burning, rashes, or lesions   LYMPH NODES: No enlarged glands  ENDOCRINE: No heat or cold intolerance; No hair loss  MUSCULOSKELETAL: No joint pain or swelling; No muscle, back, or extremity pain  PSYCHIATRIC: No depression, anxiety, mood swings, or difficulty sleeping  HEME/LYMPH: No easy bruising, or bleeding gums  ALLERY AND IMMUNOLOGIC: No hives or eczema    RADIOLOGY & ADDITIONAL TESTS:    Imaging Personally Reviewed:  [ ] YES  [ ] NO    Consultant(s) Notes Reviewed:  [ ] YES  [ ] NO    PHYSICAL EXAM:  GENERAL: NAD, well-groomed, well-developed  HEAD:  Atraumatic, Normocephalic  EYES: EOMI, PERRLA, conjunctiva and sclera clear  ENMT: No tonsillar erythema, exudates, or enlargement; Moist mucous membranes, Good dentition, No lesions  NECK: Supple, No JVD, Normal thyroid  NERVOUS SYSTEM:  Alert & Oriented X3, Good concentration; Motor Strength 5/5 B/L upper and lower extremities; DTRs 2+ intact and symmetric  CHEST/LUNG: Clear to percussion bilaterally; No rales, rhonchi, wheezing, or rubs  HEART: Regular rate and rhythm; No murmurs, rubs, or gallops  ABDOMEN: Soft, Nontender, Nondistended; Bowel sounds present  EXTREMITIES:  2+ Peripheral Pulses, No clubbing, cyanosis, or edema  LYMPH: No lymphadenopathy noted  SKIN: No rashes or lesions    LABS:              CAPILLARY BLOOD GLUCOSE                MEDICATIONS  (STANDING):  dexAMETHasone  Injectable 4 milliGRAM(s) IV Push every 6 hours  gabapentin 300 milliGRAM(s) Oral three times a day  influenza   Vaccine 0.5 milliLiter(s) IntraMuscular once  lidocaine   Patch 1 Patch Transdermal every 24 hours  pantoprazole    Tablet 40 milliGRAM(s) Oral before breakfast  tiZANidine 2 milliGRAM(s) Oral <User Schedule>    MEDICATIONS  (PRN):  cyclobenzaprine 5 milliGRAM(s) Oral three times a day PRN Mild to Moderate pain  oxyCODONE    IR 5 milliGRAM(s) Oral every 4 hours PRN Severe Pain (7 - 10)  simethicone 80 milliGRAM(s) Chew every 8 hours PRN Gas      Care Discussed with Consultants/Other Providers [ ] YES  [ ] NO Patient is a 34y old  Female who presents with a chief complaint of sudden on set of back pain , unable to ambulate (06 Dec 2019 21:12)    doing fair, pain improving    INTERVAL HPI/OVERNIGHT EVENTS:  T(C): 36.6 (12-07-19 @ 14:07), Max: 36.9 (12-06-19 @ 21:45)  HR: 83 (12-07-19 @ 14:07) (70 - 84)  BP: 117/67 (12-07-19 @ 14:07) (108/68 - 134/74)  RR: 18 (12-07-19 @ 14:07) (18 - 18)  SpO2: 100% (12-07-19 @ 14:07) (100% - 100%)  Wt(kg): --  I&O's Summary      PAST MEDICAL & SURGICAL HISTORY:  Paresthesia of right leg  Lumbar herniated disc  Acid reflux  Irregular menstruation  Thyroid cancer: s/p partial thyroidectomy 2017  Herniated nucleus pulposus, L4-5: H/O microdiscectomy  11/2016  S/P partial thyroidectomy: 2017  H/O microdiscectomy: 11/2016      SOCIAL HISTORY  Alcohol:  Tobacco:  Illicit substance use:    FAMILY HISTORY:    REVIEW OF SYSTEMS:  CONSTITUTIONAL: No fever, weight loss, or fatigue  EYES: No eye pain, visual disturbances, or discharge  ENMT:  No difficulty hearing, tinnitus, vertigo; No sinus or throat pain  NECK: No pain or stiffness  RESPIRATORY: No cough, wheezing, chills or hemoptysis; No shortness of breath  CARDIOVASCULAR: No chest pain, palpitations, dizziness, or leg swelling  GASTROINTESTINAL: No abdominal or epigastric pain. No nausea, vomiting, or hematemesis; No diarrhea or constipation. No melena or hematochezia.  GENITOURINARY: No dysuria, frequency, hematuria, or incontinence  NEUROLOGICAL: No headaches, memory loss, loss of strength, numbness, or tremors  SKIN: No itching, burning, rashes, or lesions   LYMPH NODES: No enlarged glands  ENDOCRINE: No heat or cold intolerance; No hair loss  MUSCULOSKELETAL: No joint pain or swelling; No muscle, back, or extremity pain  PSYCHIATRIC: No depression, anxiety, mood swings, or difficulty sleeping  HEME/LYMPH: No easy bruising, or bleeding gums  ALLERY AND IMMUNOLOGIC: No hives or eczema    RADIOLOGY & ADDITIONAL TESTS:    Imaging Personally Reviewed:  [ ] YES  [ ] NO    Consultant(s) Notes Reviewed:  [ ] YES  [ ] NO    PHYSICAL EXAM:  GENERAL: NAD, well-groomed, well-developed  HEAD:  Atraumatic, Normocephalic  EYES: EOMI, PERRLA, conjunctiva and sclera clear  ENMT: No tonsillar erythema, exudates, or enlargement; Moist mucous membranes, Good dentition, No lesions  NECK: Supple, No JVD, Normal thyroid  NERVOUS SYSTEM:  Alert & Oriented X3, Good concentration; Motor Strength 5/5 B/L upper and lower extremities; DTRs 2+ intact and symmetric  CHEST/LUNG: Clear to percussion bilaterally; No rales, rhonchi, wheezing, or rubs  HEART: Regular rate and rhythm; No murmurs, rubs, or gallops  ABDOMEN: Soft, Nontender, Nondistended; Bowel sounds present  EXTREMITIES:  2+ Peripheral Pulses, No clubbing, cyanosis, or edema  LYMPH: No lymphadenopathy noted  SKIN: No rashes or lesions    LABS:              CAPILLARY BLOOD GLUCOSE                MEDICATIONS  (STANDING):  dexAMETHasone  Injectable 4 milliGRAM(s) IV Push every 6 hours  gabapentin 300 milliGRAM(s) Oral three times a day  influenza   Vaccine 0.5 milliLiter(s) IntraMuscular once  lidocaine   Patch 1 Patch Transdermal every 24 hours  pantoprazole    Tablet 40 milliGRAM(s) Oral before breakfast  tiZANidine 2 milliGRAM(s) Oral <User Schedule>    MEDICATIONS  (PRN):  cyclobenzaprine 5 milliGRAM(s) Oral three times a day PRN Mild to Moderate pain  oxyCODONE    IR 5 milliGRAM(s) Oral every 4 hours PRN Severe Pain (7 - 10)  simethicone 80 milliGRAM(s) Chew every 8 hours PRN Gas      Care Discussed with Consultants/Other Providers [ ] YES  [ ] NO

## 2019-12-08 RX ORDER — DEXAMETHASONE 0.5 MG/5ML
4 ELIXIR ORAL EVERY 6 HOURS
Refills: 0 | Status: DISCONTINUED | OUTPATIENT
Start: 2019-12-08 | End: 2019-12-09

## 2019-12-08 RX ADMIN — Medication 4 MILLIGRAM(S): at 17:59

## 2019-12-08 RX ADMIN — GABAPENTIN 300 MILLIGRAM(S): 400 CAPSULE ORAL at 14:52

## 2019-12-08 RX ADMIN — Medication 4 MILLIGRAM(S): at 05:45

## 2019-12-08 RX ADMIN — OXYCODONE HYDROCHLORIDE 5 MILLIGRAM(S): 5 TABLET ORAL at 19:48

## 2019-12-08 RX ADMIN — TIZANIDINE 2 MILLIGRAM(S): 4 TABLET ORAL at 10:56

## 2019-12-08 RX ADMIN — LIDOCAINE 1 PATCH: 4 CREAM TOPICAL at 04:57

## 2019-12-08 RX ADMIN — Medication 4 MILLIGRAM(S): at 22:14

## 2019-12-08 RX ADMIN — OXYCODONE HYDROCHLORIDE 5 MILLIGRAM(S): 5 TABLET ORAL at 12:00

## 2019-12-08 RX ADMIN — LIDOCAINE 1 PATCH: 4 CREAM TOPICAL at 17:59

## 2019-12-08 RX ADMIN — TIZANIDINE 2 MILLIGRAM(S): 4 TABLET ORAL at 19:51

## 2019-12-08 RX ADMIN — CYCLOBENZAPRINE HYDROCHLORIDE 5 MILLIGRAM(S): 10 TABLET, FILM COATED ORAL at 19:50

## 2019-12-08 RX ADMIN — LIDOCAINE 1 PATCH: 4 CREAM TOPICAL at 19:45

## 2019-12-08 RX ADMIN — SIMETHICONE 80 MILLIGRAM(S): 80 TABLET, CHEWABLE ORAL at 19:48

## 2019-12-08 RX ADMIN — Medication 4 MILLIGRAM(S): at 10:57

## 2019-12-08 RX ADMIN — OXYCODONE HYDROCHLORIDE 5 MILLIGRAM(S): 5 TABLET ORAL at 11:02

## 2019-12-08 RX ADMIN — GABAPENTIN 300 MILLIGRAM(S): 400 CAPSULE ORAL at 05:45

## 2019-12-08 RX ADMIN — PANTOPRAZOLE SODIUM 40 MILLIGRAM(S): 20 TABLET, DELAYED RELEASE ORAL at 05:45

## 2019-12-08 RX ADMIN — GABAPENTIN 300 MILLIGRAM(S): 400 CAPSULE ORAL at 21:09

## 2019-12-08 RX ADMIN — OXYCODONE HYDROCHLORIDE 5 MILLIGRAM(S): 5 TABLET ORAL at 20:35

## 2019-12-08 RX ADMIN — SIMETHICONE 80 MILLIGRAM(S): 80 TABLET, CHEWABLE ORAL at 11:02

## 2019-12-08 NOTE — PROGRESS NOTE ADULT - PROBLEM SELECTOR PLAN 3
2/2 back problem   -once pain better , will need PT

## 2019-12-08 NOTE — PROGRESS NOTE ADULT - ATTENDING COMMENTS
seen by neuro/ neurosurgery  no surgical intervention  change decadron to PO and taper   -if stable plan for d/c home in am on tapering steroid and muscle relaxant

## 2019-12-08 NOTE — PROGRESS NOTE ADULT - SUBJECTIVE AND OBJECTIVE BOX
Patient is a 34y old  Female who presents with a chief complaint of sudden on set of back pain , unable to ambulate (07 Dec 2019 19:34)      INTERVAL HPI/OVERNIGHT EVENTS:  T(C): 36.6 (12-08-19 @ 14:23), Max: 36.9 (12-08-19 @ 05:47)  HR: 104 (12-08-19 @ 14:23) (61 - 104)  BP: 104/65 (12-08-19 @ 14:23) (104/63 - 114/71)  RR: 18 (12-08-19 @ 14:23) (18 - 18)  SpO2: 94% (12-08-19 @ 14:23) (94% - 100%)  Wt(kg): --  I&O's Summary      PAST MEDICAL & SURGICAL HISTORY:  Paresthesia of right leg  Lumbar herniated disc  Acid reflux  Irregular menstruation  Thyroid cancer: s/p partial thyroidectomy 2017  Herniated nucleus pulposus, L4-5: H/O microdiscectomy  11/2016  S/P partial thyroidectomy: 2017  H/O microdiscectomy: 11/2016      SOCIAL HISTORY  Alcohol:  Tobacco:  Illicit substance use:    FAMILY HISTORY:    REVIEW OF SYSTEMS:  CONSTITUTIONAL: No fever, weight loss, or fatigue  EYES: No eye pain, visual disturbances, or discharge  ENMT:  No difficulty hearing, tinnitus, vertigo; No sinus or throat pain  NECK: No pain or stiffness  RESPIRATORY: No cough, wheezing, chills or hemoptysis; No shortness of breath  CARDIOVASCULAR: No chest pain, palpitations, dizziness, or leg swelling  GASTROINTESTINAL: No abdominal or epigastric pain. No nausea, vomiting, or hematemesis; No diarrhea or constipation. No melena or hematochezia.  GENITOURINARY: No dysuria, frequency, hematuria, or incontinence  NEUROLOGICAL: No headaches, memory loss, loss of strength, numbness, or tremors  SKIN: No itching, burning, rashes, or lesions   LYMPH NODES: No enlarged glands  ENDOCRINE: No heat or cold intolerance; No hair loss  MUSCULOSKELETAL: No joint pain or swelling; No muscle, back, or extremity pain  PSYCHIATRIC: No depression, anxiety, mood swings, or difficulty sleeping  HEME/LYMPH: No easy bruising, or bleeding gums  ALLERY AND IMMUNOLOGIC: No hives or eczema    RADIOLOGY & ADDITIONAL TESTS:    Imaging Personally Reviewed:  [ ] YES  [ ] NO    Consultant(s) Notes Reviewed:  [ ] YES  [ ] NO    PHYSICAL EXAM:  GENERAL: NAD, well-groomed, well-developed  HEAD:  Atraumatic, Normocephalic  EYES: EOMI, PERRLA, conjunctiva and sclera clear  ENMT: No tonsillar erythema, exudates, or enlargement; Moist mucous membranes, Good dentition, No lesions  NECK: Supple, No JVD, Normal thyroid  NERVOUS SYSTEM:  Alert & Oriented X3, Good concentration; Motor Strength 5/5 B/L upper and lower extremities; DTRs 2+ intact and symmetric  CHEST/LUNG: Clear to percussion bilaterally; No rales, rhonchi, wheezing, or rubs  HEART: Regular rate and rhythm; No murmurs, rubs, or gallops  ABDOMEN: Soft, Nontender, Nondistended; Bowel sounds present  EXTREMITIES:  2+ Peripheral Pulses, No clubbing, cyanosis, or edema  LYMPH: No lymphadenopathy noted  SKIN: No rashes or lesions    LABS:              CAPILLARY BLOOD GLUCOSE                MEDICATIONS  (STANDING):  dexAMETHasone  Injectable 4 milliGRAM(s) IV Push every 6 hours  gabapentin 300 milliGRAM(s) Oral three times a day  influenza   Vaccine 0.5 milliLiter(s) IntraMuscular once  lidocaine   Patch 1 Patch Transdermal every 24 hours  pantoprazole    Tablet 40 milliGRAM(s) Oral before breakfast  tiZANidine 2 milliGRAM(s) Oral <User Schedule>    MEDICATIONS  (PRN):  cyclobenzaprine 5 milliGRAM(s) Oral three times a day PRN Mild to Moderate pain  oxyCODONE    IR 5 milliGRAM(s) Oral every 4 hours PRN Severe Pain (7 - 10)  simethicone 80 milliGRAM(s) Chew every 8 hours PRN Gas      Care Discussed with Consultants/Other Providers [ ] YES  [ ] NO Patient is a 34y old  Female who presents with a chief complaint of sudden on set of back pain , unable to ambulate (07 Dec 2019 19:34)    pt. seen and examined, doing fair     INTERVAL HPI/OVERNIGHT EVENTS:  T(C): 36.6 (12-08-19 @ 14:23), Max: 36.9 (12-08-19 @ 05:47)  HR: 104 (12-08-19 @ 14:23) (61 - 104)  BP: 104/65 (12-08-19 @ 14:23) (104/63 - 114/71)  RR: 18 (12-08-19 @ 14:23) (18 - 18)  SpO2: 94% (12-08-19 @ 14:23) (94% - 100%)  Wt(kg): --  I&O's Summary      PAST MEDICAL & SURGICAL HISTORY:  Paresthesia of right leg  Lumbar herniated disc  Acid reflux  Irregular menstruation  Thyroid cancer: s/p partial thyroidectomy 2017  Herniated nucleus pulposus, L4-5: H/O microdiscectomy  11/2016  S/P partial thyroidectomy: 2017  H/O microdiscectomy: 11/2016      SOCIAL HISTORY  Alcohol:  Tobacco:  Illicit substance use:    FAMILY HISTORY:    REVIEW OF SYSTEMS:  CONSTITUTIONAL: No fever, weight loss, or fatigue  EYES: No eye pain, visual disturbances, or discharge  ENMT:  No difficulty hearing, tinnitus, vertigo; No sinus or throat pain  NECK: No pain or stiffness  RESPIRATORY: No cough, wheezing, chills or hemoptysis; No shortness of breath  CARDIOVASCULAR: No chest pain, palpitations, dizziness, or leg swelling  GASTROINTESTINAL: No abdominal or epigastric pain. No nausea, vomiting, or hematemesis; No diarrhea or constipation. No melena or hematochezia.  GENITOURINARY: No dysuria, frequency, hematuria, or incontinence  NEUROLOGICAL: No headaches, memory loss, loss of strength, numbness, or tremors  SKIN: No itching, burning, rashes, or lesions   LYMPH NODES: No enlarged glands  ENDOCRINE: No heat or cold intolerance; No hair loss  MUSCULOSKELETAL: No joint pain or swelling; No muscle, back, or extremity pain  PSYCHIATRIC: No depression, anxiety, mood swings, or difficulty sleeping  HEME/LYMPH: No easy bruising, or bleeding gums  ALLERY AND IMMUNOLOGIC: No hives or eczema    RADIOLOGY & ADDITIONAL TESTS:    Imaging Personally Reviewed:  [ ] YES  [ ] NO    Consultant(s) Notes Reviewed:  [ ] YES  [ ] NO    PHYSICAL EXAM:  GENERAL: NAD, well-groomed, well-developed  HEAD:  Atraumatic, Normocephalic  EYES: EOMI, PERRLA, conjunctiva and sclera clear  ENMT: No tonsillar erythema, exudates, or enlargement; Moist mucous membranes, Good dentition, No lesions  NECK: Supple, No JVD, Normal thyroid  NERVOUS SYSTEM:  Alert & Oriented X3, Good concentration; Motor Strength 5/5 B/L upper and lower extremities; DTRs 2+ intact and symmetric  CHEST/LUNG: Clear to percussion bilaterally; No rales, rhonchi, wheezing, or rubs  HEART: Regular rate and rhythm; No murmurs, rubs, or gallops  ABDOMEN: Soft, Nontender, Nondistended; Bowel sounds present  EXTREMITIES:  2+ Peripheral Pulses, No clubbing, cyanosis, or edema  LYMPH: No lymphadenopathy noted  SKIN: No rashes or lesions    LABS:              CAPILLARY BLOOD GLUCOSE                MEDICATIONS  (STANDING):  dexAMETHasone  Injectable 4 milliGRAM(s) IV Push every 6 hours  gabapentin 300 milliGRAM(s) Oral three times a day  influenza   Vaccine 0.5 milliLiter(s) IntraMuscular once  lidocaine   Patch 1 Patch Transdermal every 24 hours  pantoprazole    Tablet 40 milliGRAM(s) Oral before breakfast  tiZANidine 2 milliGRAM(s) Oral <User Schedule>    MEDICATIONS  (PRN):  cyclobenzaprine 5 milliGRAM(s) Oral three times a day PRN Mild to Moderate pain  oxyCODONE    IR 5 milliGRAM(s) Oral every 4 hours PRN Severe Pain (7 - 10)  simethicone 80 milliGRAM(s) Chew every 8 hours PRN Gas      Care Discussed with Consultants/Other Providers [ ] YES  [ ] NO

## 2019-12-08 NOTE — PROGRESS NOTE ADULT - REASON FOR ADMISSION
sudden on set of back pain , unable to ambulate

## 2019-12-08 NOTE — PROGRESS NOTE ADULT - PROBLEM SELECTOR PROBLEM 4
LOV=[id number]  
Prescription approved per [facility].  
Thyroid cancer

## 2019-12-08 NOTE — PROGRESS NOTE ADULT - PROBLEM SELECTOR PLAN 2
CT and MRI done   -f/u with neuro surgery

## 2019-12-09 ENCOUNTER — TRANSCRIPTION ENCOUNTER (OUTPATIENT)
Age: 34
End: 2019-12-09

## 2019-12-09 VITALS
DIASTOLIC BLOOD PRESSURE: 66 MMHG | HEART RATE: 80 BPM | SYSTOLIC BLOOD PRESSURE: 112 MMHG | TEMPERATURE: 98 F | OXYGEN SATURATION: 99 % | RESPIRATION RATE: 18 BRPM

## 2019-12-09 RX ORDER — TIZANIDINE 4 MG/1
1 TABLET ORAL
Qty: 4 | Refills: 0
Start: 2019-12-09 | End: 2019-12-12

## 2019-12-09 RX ORDER — GABAPENTIN 400 MG/1
1 CAPSULE ORAL
Qty: 0 | Refills: 0 | DISCHARGE

## 2019-12-09 RX ORDER — PANTOPRAZOLE SODIUM 20 MG/1
1 TABLET, DELAYED RELEASE ORAL
Qty: 10 | Refills: 0
Start: 2019-12-09 | End: 2019-12-18

## 2019-12-09 RX ORDER — GABAPENTIN 400 MG/1
1 CAPSULE ORAL
Qty: 30 | Refills: 0
Start: 2019-12-09 | End: 2019-12-18

## 2019-12-09 RX ADMIN — LIDOCAINE 1 PATCH: 4 CREAM TOPICAL at 06:40

## 2019-12-09 RX ADMIN — GABAPENTIN 300 MILLIGRAM(S): 400 CAPSULE ORAL at 06:37

## 2019-12-09 RX ADMIN — SIMETHICONE 80 MILLIGRAM(S): 80 TABLET, CHEWABLE ORAL at 06:39

## 2019-12-09 RX ADMIN — PANTOPRAZOLE SODIUM 40 MILLIGRAM(S): 20 TABLET, DELAYED RELEASE ORAL at 06:37

## 2019-12-09 RX ADMIN — Medication 4 MILLIGRAM(S): at 12:20

## 2019-12-09 RX ADMIN — TIZANIDINE 2 MILLIGRAM(S): 4 TABLET ORAL at 08:50

## 2019-12-09 RX ADMIN — Medication 4 MILLIGRAM(S): at 06:37

## 2019-12-09 RX ADMIN — INFLUENZA VIRUS VACCINE 0.5 MILLILITER(S): 15; 15; 15; 15 SUSPENSION INTRAMUSCULAR at 12:20

## 2019-12-09 NOTE — DISCHARGE NOTE NURSING/CASE MANAGEMENT/SOCIAL WORK - PATIENT PORTAL LINK FT
You can access the FollowMyHealth Patient Portal offered by [facility] by registering at the following website: [email or web address]. By joining [employer]’s FollowMyHealth portal, you will also be able to view your health information using other applications (apps) compatible with our system.

## 2020-04-20 NOTE — PACU DISCHARGE NOTE - NAUSEA/VOMITING:
Noted, Sunita should call the clinic when her sleep is back to baseline.   
Patient stating she is not able to reply to message via portal. She is stating she has been drinking about \"1 glass of [id number]-enrique,\" but comparatively it's been much less.  
Sunita is a new patient, with new lamotrigine start on 4/8/2020. She was instructed to take lamotrigine 25mg daily x2 weeks, then to increase to 50mg. At this time I recommend she stop the lamotrigine and monitor her symptoms, and see if her sleep improves, or at least returns to baseline. I do not want to start another mood stabilizer until her symptoms have stabilized. Alcohol also disturbs sleep. She reported drinking daily 2 drinks and 4-6 drinks per day on the weekend. Is she drinking more or less alcohol?, because this could have an effect also on the change in her sleep.   
None

## 2020-09-15 NOTE — CONSULT NOTE ADULT - PROBLEM SELECTOR PROBLEM 1
Patient has the following symptoms: stated having shingles started September 9, 2020. Stated has a shingle on the top part of the right breast that looks infected. Wants to be seen.    Pharmacy has been verified. [facility] [place].    [phone]  Will be leaving around 0900 and then be home between 11:30 and 1:00.     Herniated nucleus pulposus, L4-5

## 2020-11-02 NOTE — H&P PST ADULT - MS GEN HX ROS MEA POS PC
negative intermediate pain/back pain/leg pain R Affect and characteristics of appearance, verbalizations, behaviors are appropriate

## 2020-11-16 ENCOUNTER — INPATIENT (INPATIENT)
Facility: HOSPITAL | Age: 35
LOS: 3 days | Discharge: HOME | DRG: 552 | End: 2020-11-20
Attending: HOSPITALIST | Admitting: STUDENT IN AN ORGANIZED HEALTH CARE EDUCATION/TRAINING PROGRAM
Payer: MEDICAID

## 2020-11-16 VITALS
OXYGEN SATURATION: 99 % | WEIGHT: 210.1 LBS | HEIGHT: 65 IN | TEMPERATURE: 99 F | DIASTOLIC BLOOD PRESSURE: 71 MMHG | RESPIRATION RATE: 20 BRPM | SYSTOLIC BLOOD PRESSURE: 102 MMHG | HEART RATE: 71 BPM

## 2020-11-16 DIAGNOSIS — Z98.890 OTHER SPECIFIED POSTPROCEDURAL STATES: Chronic | ICD-10-CM

## 2020-11-16 DIAGNOSIS — E89.0 POSTPROCEDURAL HYPOTHYROIDISM: Chronic | ICD-10-CM

## 2020-11-16 PROCEDURE — 99285 EMERGENCY DEPT VISIT HI MDM: CPT

## 2020-11-16 RX ORDER — OXYCODONE HYDROCHLORIDE 5 MG/1
5 TABLET ORAL ONCE
Refills: 0 | Status: DISCONTINUED | OUTPATIENT
Start: 2020-11-16 | End: 2020-11-16

## 2020-11-16 RX ORDER — DIAZEPAM 5 MG
5 TABLET ORAL ONCE
Refills: 0 | Status: DISCONTINUED | OUTPATIENT
Start: 2020-11-16 | End: 2020-11-16

## 2020-11-16 RX ORDER — MORPHINE SULFATE 50 MG/1
4 CAPSULE, EXTENDED RELEASE ORAL ONCE
Refills: 0 | Status: DISCONTINUED | OUTPATIENT
Start: 2020-11-16 | End: 2020-11-16

## 2020-11-16 RX ORDER — LIDOCAINE 4 G/100G
1 CREAM TOPICAL ONCE
Refills: 0 | Status: COMPLETED | OUTPATIENT
Start: 2020-11-16 | End: 2020-11-16

## 2020-11-16 RX ORDER — IBUPROFEN 200 MG
600 TABLET ORAL ONCE
Refills: 0 | Status: COMPLETED | OUTPATIENT
Start: 2020-11-16 | End: 2020-11-16

## 2020-11-16 RX ORDER — ACETAMINOPHEN 500 MG
975 TABLET ORAL ONCE
Refills: 0 | Status: COMPLETED | OUTPATIENT
Start: 2020-11-16 | End: 2020-11-16

## 2020-11-16 RX ADMIN — Medication 5 MILLIGRAM(S): at 22:49

## 2020-11-16 RX ADMIN — OXYCODONE HYDROCHLORIDE 5 MILLIGRAM(S): 5 TABLET ORAL at 22:49

## 2020-11-16 RX ADMIN — LIDOCAINE 1 PATCH: 4 CREAM TOPICAL at 22:50

## 2020-11-16 RX ADMIN — Medication 50 MILLIGRAM(S): at 22:50

## 2020-11-16 RX ADMIN — Medication 600 MILLIGRAM(S): at 21:09

## 2020-11-16 RX ADMIN — Medication 975 MILLIGRAM(S): at 22:49

## 2020-11-16 NOTE — ED PROVIDER NOTE - PHYSICAL EXAMINATION
Gen: AAOx3, non-toxic  Head: NCAT  HEENT: EOMI, oral mucosa moist, normal conjunctiva  Lung: CTAB, no respiratory distress, no wheezes/rhonchi/rales B/L, speaking in full sentences  CV: RRR, no murmurs, rubs or gallops  Abd: soft, NTND, no guarding, no CVA tenderness  MSK: marked left paraspinal lumbar TTP, slight decreased sensation on the left lower extremity.   Neuro: No focal sensory or motor deficits, normal CN exam   Skin: Warm, well perfused, no rash  Psych: normal affect.

## 2020-11-16 NOTE — ED PROVIDER NOTE - RAPID ASSESSMENT
35 y.o F with pmhx spinal fusion x2 years ago presents with atraumatic back pain described as a stabbing sensation. Reports pain is similar to when she had her spinal fusion. Pain radiates around her waist. Endorsed Tylenol with no relief. Denies loss of bladder control.     Robert Parnell (MD) note: The scribe (Teresa Olvera)'s documentation has been prepared under my direction and personally reviewed by me.  Patient was seen as a tele QDOC patient, a thorough physical exam was not performed. The patient will be seen and further worked up in the main emergency department and their care will be completed by the main emergency department team. Receiving team will follow up on labs, analgesia, any clinical imaging, reassess and disposition as clinically indicated, all decisions regarding the progression of care will be made at their discretion. 35 y.o F with pmhx spinal fusion x2 years ago presents with atraumatic back pain described as a stabbing sensation. Reports pain is similar to when she had her spinal fusion. Pain radiates around her waist. Endorsed Tylenol with no relief. Denies loss of bowel or bladder control or loss of sensation.    Robert Parnell (MD) note: The scribe (Teresa Olvera)'s documentation has been prepared under my direction and personally reviewed by me.  Patient was seen as a tele QDOC patient, a thorough physical exam was not performed. The patient will be seen and further worked up in the main emergency department and their care will be completed by the main emergency department team. Receiving team will follow up on labs, analgesia, any clinical imaging, reassess and disposition as clinically indicated, all decisions regarding the progression of care will be made at their discretion.

## 2020-11-16 NOTE — ED PROVIDER NOTE - CLINICAL SUMMARY MEDICAL DECISION MAKING FREE TEXT BOX
34 yo F with hx of lumbar spine fusion surgery 2 years ago, thyroid cancer s/p partial thyroidectomy 2 years ago presents with acute left lower back pain starting today at 6pm. VS WNL. Ddx radiculopathy likely 2nd to herniated disc. Will get pain meds and reassess. 35F hx of spinal fusion presneting with atraumatic back pain, states it is severe, on exam appears uncomfortable, will provide analgesics, attempt to ambulate and discharge if improving, though patient appears very uncomfortable and may require admission if unable to ambulate or be pain controlled.

## 2020-11-16 NOTE — ED PROVIDER NOTE - OBJECTIVE STATEMENT
36 yo F with hx of lumbar spine fusion surgery 2 years ago, thyroid cancer s/p partial thyroidectomy 2 years ago presents with acute left lower back pain starting today at 6pm. Reports a 10/10 constant sharp shooting LL back pain worse with movement radiating to her left foot with no bowel or urinary symptoms, fevers or chills.  Reports associated left lower numbness, but is able to move her toes / knee.  No recent trauma or falls. No weight loss or night sweats. Reports her pain feels similar to 2 years ago.

## 2020-11-16 NOTE — ED ADULT NURSE NOTE - OBJECTIVE STATEMENT
Pt is a 35 yr old female with hx of multiple back surgeries including a fusion at L4-L5 and hypothyroidism coming in for back pain starting in the lumbar region radiating down her legs and around her midsection. Pt states she was not doing anything to cause the pain- she stood up from the toilet and the pain suddenly came on. Pt has had this pain before - both times is unable to walk- pt can help her self over to the stretcher but walking distances is too difficult and painful. Patient said she took Tylenol at home which usually helps slightly but did not today.

## 2020-11-16 NOTE — ED PROVIDER NOTE - PROGRESS NOTE DETAILS
Dr. Montes's note: At the beginning of my shift, I took over care of the patient from outgoing physician with plan to follow up Ct results and re-evaluate for pain control. CTs show no acute process. pt has required multiple doses of pain meds for her severe pain - it works temporarily and pain becomes severe again once it wears off. pt unable to ambulate due to pain. labs including wbc and crp unremarkable. given no acute neuro symptoms or findings, it is not consistent for acute spinal cord pathology at this point and no indication for emergent mri although if pain persists, should be considered as inpt for further eval. discussed case w hospitalist for admission.

## 2020-11-16 NOTE — ED PROVIDER NOTE - NS ED ROS FT
GENERAL: No fever or chills  EYES: no change in vision  HEENT: no trouble swallowing or speaking  CARDIAC: no chest pain or palpiations   PULMONARY: no cough or SOB  GI: no abdominal pain, nausea, vomiting, diarrhea, or constipation   : No changes in urination  SKIN: no rashes  NEURO: see hpi  MSK: No joint pain

## 2020-11-17 DIAGNOSIS — C73 MALIGNANT NEOPLASM OF THYROID GLAND: ICD-10-CM

## 2020-11-17 DIAGNOSIS — K21.9 GASTRO-ESOPHAGEAL REFLUX DISEASE WITHOUT ESOPHAGITIS: ICD-10-CM

## 2020-11-17 DIAGNOSIS — M54.9 DORSALGIA, UNSPECIFIED: ICD-10-CM

## 2020-11-17 DIAGNOSIS — Z29.9 ENCOUNTER FOR PROPHYLACTIC MEASURES, UNSPECIFIED: ICD-10-CM

## 2020-11-17 DIAGNOSIS — M54.42 LUMBAGO WITH SCIATICA, LEFT SIDE: ICD-10-CM

## 2020-11-17 LAB
ALBUMIN SERPL ELPH-MCNC: 4.3 G/DL — SIGNIFICANT CHANGE UP (ref 3.3–5)
ALP SERPL-CCNC: 61 U/L — SIGNIFICANT CHANGE UP (ref 40–120)
ALT FLD-CCNC: 21 U/L — SIGNIFICANT CHANGE UP (ref 10–45)
ANION GAP SERPL CALC-SCNC: 12 MMOL/L — SIGNIFICANT CHANGE UP (ref 5–17)
AST SERPL-CCNC: 15 U/L — SIGNIFICANT CHANGE UP (ref 10–40)
BASOPHILS # BLD AUTO: 0.03 K/UL — SIGNIFICANT CHANGE UP (ref 0–0.2)
BASOPHILS NFR BLD AUTO: 0.4 % — SIGNIFICANT CHANGE UP (ref 0–2)
BILIRUB SERPL-MCNC: 0.3 MG/DL — SIGNIFICANT CHANGE UP (ref 0.2–1.2)
BUN SERPL-MCNC: 14 MG/DL — SIGNIFICANT CHANGE UP (ref 7–23)
CALCIUM SERPL-MCNC: 9 MG/DL — SIGNIFICANT CHANGE UP (ref 8.4–10.5)
CHLORIDE SERPL-SCNC: 105 MMOL/L — SIGNIFICANT CHANGE UP (ref 96–108)
CO2 SERPL-SCNC: 23 MMOL/L — SIGNIFICANT CHANGE UP (ref 22–31)
CREAT SERPL-MCNC: 0.69 MG/DL — SIGNIFICANT CHANGE UP (ref 0.5–1.3)
EOSINOPHIL # BLD AUTO: 0.04 K/UL — SIGNIFICANT CHANGE UP (ref 0–0.5)
EOSINOPHIL NFR BLD AUTO: 0.5 % — SIGNIFICANT CHANGE UP (ref 0–6)
GLUCOSE SERPL-MCNC: 90 MG/DL — SIGNIFICANT CHANGE UP (ref 70–99)
HCG SERPL-ACNC: <2 MIU/ML — SIGNIFICANT CHANGE UP
HCT VFR BLD CALC: 41.2 % — SIGNIFICANT CHANGE UP (ref 34.5–45)
HGB BLD-MCNC: 12.7 G/DL — SIGNIFICANT CHANGE UP (ref 11.5–15.5)
IMM GRANULOCYTES NFR BLD AUTO: 0.4 % — SIGNIFICANT CHANGE UP (ref 0–1.5)
LYMPHOCYTES # BLD AUTO: 2.96 K/UL — SIGNIFICANT CHANGE UP (ref 1–3.3)
LYMPHOCYTES # BLD AUTO: 38.4 % — SIGNIFICANT CHANGE UP (ref 13–44)
MCHC RBC-ENTMCNC: 27.4 PG — SIGNIFICANT CHANGE UP (ref 27–34)
MCHC RBC-ENTMCNC: 30.8 GM/DL — LOW (ref 32–36)
MCV RBC AUTO: 88.8 FL — SIGNIFICANT CHANGE UP (ref 80–100)
MONOCYTES # BLD AUTO: 0.31 K/UL — SIGNIFICANT CHANGE UP (ref 0–0.9)
MONOCYTES NFR BLD AUTO: 4 % — SIGNIFICANT CHANGE UP (ref 2–14)
NEUTROPHILS # BLD AUTO: 4.33 K/UL — SIGNIFICANT CHANGE UP (ref 1.8–7.4)
NEUTROPHILS NFR BLD AUTO: 56.3 % — SIGNIFICANT CHANGE UP (ref 43–77)
NRBC # BLD: 0 /100 WBCS — SIGNIFICANT CHANGE UP (ref 0–0)
PLATELET # BLD AUTO: 244 K/UL — SIGNIFICANT CHANGE UP (ref 150–400)
POTASSIUM SERPL-MCNC: 4 MMOL/L — SIGNIFICANT CHANGE UP (ref 3.5–5.3)
POTASSIUM SERPL-SCNC: 4 MMOL/L — SIGNIFICANT CHANGE UP (ref 3.5–5.3)
PROT SERPL-MCNC: 7.1 G/DL — SIGNIFICANT CHANGE UP (ref 6–8.3)
RBC # BLD: 4.64 M/UL — SIGNIFICANT CHANGE UP (ref 3.8–5.2)
RBC # FLD: 13.3 % — SIGNIFICANT CHANGE UP (ref 10.3–14.5)
SARS-COV-2 RNA SPEC QL NAA+PROBE: SIGNIFICANT CHANGE UP
SODIUM SERPL-SCNC: 140 MMOL/L — SIGNIFICANT CHANGE UP (ref 135–145)
WBC # BLD: 7.7 K/UL — SIGNIFICANT CHANGE UP (ref 3.8–10.5)
WBC # FLD AUTO: 7.7 K/UL — SIGNIFICANT CHANGE UP (ref 3.8–10.5)

## 2020-11-17 PROCEDURE — 72128 CT CHEST SPINE W/O DYE: CPT | Mod: 26

## 2020-11-17 PROCEDURE — 99223 1ST HOSP IP/OBS HIGH 75: CPT

## 2020-11-17 PROCEDURE — 72131 CT LUMBAR SPINE W/O DYE: CPT | Mod: 26

## 2020-11-17 PROCEDURE — 72158 MRI LUMBAR SPINE W/O & W/DYE: CPT | Mod: 26

## 2020-11-17 PROCEDURE — 72157 MRI CHEST SPINE W/O & W/DYE: CPT | Mod: 26

## 2020-11-17 RX ORDER — LIDOCAINE 4 G/100G
1 CREAM TOPICAL DAILY
Refills: 0 | Status: DISCONTINUED | OUTPATIENT
Start: 2020-11-17 | End: 2020-11-20

## 2020-11-17 RX ORDER — LANOLIN ALCOHOL/MO/W.PET/CERES
1 CREAM (GRAM) TOPICAL
Qty: 0 | Refills: 0 | DISCHARGE

## 2020-11-17 RX ORDER — HYDROMORPHONE HYDROCHLORIDE 2 MG/ML
0.5 INJECTION INTRAMUSCULAR; INTRAVENOUS; SUBCUTANEOUS ONCE
Refills: 0 | Status: DISCONTINUED | OUTPATIENT
Start: 2020-11-17 | End: 2020-11-17

## 2020-11-17 RX ORDER — OXYCODONE HYDROCHLORIDE 5 MG/1
5 TABLET ORAL EVERY 4 HOURS
Refills: 0 | Status: DISCONTINUED | OUTPATIENT
Start: 2020-11-17 | End: 2020-11-19

## 2020-11-17 RX ORDER — ASPIRIN/ACETAMINOPHEN/CAFFEINE 250-250-65
2 TABLET ORAL
Qty: 0 | Refills: 0 | DISCHARGE

## 2020-11-17 RX ORDER — ONDANSETRON 8 MG/1
4 TABLET, FILM COATED ORAL EVERY 6 HOURS
Refills: 0 | Status: DISCONTINUED | OUTPATIENT
Start: 2020-11-17 | End: 2020-11-20

## 2020-11-17 RX ORDER — PANTOPRAZOLE SODIUM 20 MG/1
40 TABLET, DELAYED RELEASE ORAL
Refills: 0 | Status: DISCONTINUED | OUTPATIENT
Start: 2020-11-17 | End: 2020-11-20

## 2020-11-17 RX ORDER — ACETAMINOPHEN 500 MG
975 TABLET ORAL EVERY 6 HOURS
Refills: 0 | Status: DISCONTINUED | OUTPATIENT
Start: 2020-11-17 | End: 2020-11-20

## 2020-11-17 RX ORDER — POLYETHYLENE GLYCOL 3350 17 G/17G
17 POWDER, FOR SOLUTION ORAL
Refills: 0 | Status: DISCONTINUED | OUTPATIENT
Start: 2020-11-17 | End: 2020-11-20

## 2020-11-17 RX ORDER — ENOXAPARIN SODIUM 100 MG/ML
40 INJECTION SUBCUTANEOUS DAILY
Refills: 0 | Status: DISCONTINUED | OUTPATIENT
Start: 2020-11-17 | End: 2020-11-20

## 2020-11-17 RX ORDER — INFLUENZA VIRUS VACCINE 15; 15; 15; 15 UG/.5ML; UG/.5ML; UG/.5ML; UG/.5ML
0.5 SUSPENSION INTRAMUSCULAR ONCE
Refills: 0 | Status: DISCONTINUED | OUTPATIENT
Start: 2020-11-17 | End: 2020-11-20

## 2020-11-17 RX ORDER — ONDANSETRON 8 MG/1
4 TABLET, FILM COATED ORAL ONCE
Refills: 0 | Status: COMPLETED | OUTPATIENT
Start: 2020-11-17 | End: 2020-11-17

## 2020-11-17 RX ORDER — OXYCODONE HYDROCHLORIDE 5 MG/1
10 TABLET ORAL EVERY 6 HOURS
Refills: 0 | Status: DISCONTINUED | OUTPATIENT
Start: 2020-11-17 | End: 2020-11-19

## 2020-11-17 RX ORDER — DEXAMETHASONE 0.5 MG/5ML
10 ELIXIR ORAL ONCE
Refills: 0 | Status: COMPLETED | OUTPATIENT
Start: 2020-11-17 | End: 2020-11-17

## 2020-11-17 RX ORDER — LEVOTHYROXINE SODIUM 125 MCG
137 TABLET ORAL DAILY
Refills: 0 | Status: DISCONTINUED | OUTPATIENT
Start: 2020-11-17 | End: 2020-11-20

## 2020-11-17 RX ORDER — KETOROLAC TROMETHAMINE 30 MG/ML
15 SYRINGE (ML) INJECTION ONCE
Refills: 0 | Status: DISCONTINUED | OUTPATIENT
Start: 2020-11-17 | End: 2020-11-17

## 2020-11-17 RX ORDER — SENNA PLUS 8.6 MG/1
2 TABLET ORAL AT BEDTIME
Refills: 0 | Status: DISCONTINUED | OUTPATIENT
Start: 2020-11-17 | End: 2020-11-20

## 2020-11-17 RX ORDER — LORATADINE 10 MG/1
1 TABLET ORAL
Qty: 0 | Refills: 0 | DISCHARGE

## 2020-11-17 RX ORDER — HYDROMORPHONE HYDROCHLORIDE 2 MG/ML
1 INJECTION INTRAMUSCULAR; INTRAVENOUS; SUBCUTANEOUS ONCE
Refills: 0 | Status: DISCONTINUED | OUTPATIENT
Start: 2020-11-17 | End: 2020-11-17

## 2020-11-17 RX ORDER — ACETAMINOPHEN 500 MG
2 TABLET ORAL
Qty: 0 | Refills: 0 | DISCHARGE

## 2020-11-17 RX ORDER — GABAPENTIN 400 MG/1
100 CAPSULE ORAL THREE TIMES A DAY
Refills: 0 | Status: DISCONTINUED | OUTPATIENT
Start: 2020-11-17 | End: 2020-11-20

## 2020-11-17 RX ADMIN — HYDROMORPHONE HYDROCHLORIDE 1 MILLIGRAM(S): 2 INJECTION INTRAMUSCULAR; INTRAVENOUS; SUBCUTANEOUS at 11:00

## 2020-11-17 RX ADMIN — Medication 975 MILLIGRAM(S): at 19:30

## 2020-11-17 RX ADMIN — ONDANSETRON 4 MILLIGRAM(S): 8 TABLET, FILM COATED ORAL at 18:35

## 2020-11-17 RX ADMIN — OXYCODONE HYDROCHLORIDE 10 MILLIGRAM(S): 5 TABLET ORAL at 22:14

## 2020-11-17 RX ADMIN — Medication 15 MILLIGRAM(S): at 06:27

## 2020-11-17 RX ADMIN — HYDROMORPHONE HYDROCHLORIDE 0.5 MILLIGRAM(S): 2 INJECTION INTRAMUSCULAR; INTRAVENOUS; SUBCUTANEOUS at 03:13

## 2020-11-17 RX ADMIN — ENOXAPARIN SODIUM 40 MILLIGRAM(S): 100 INJECTION SUBCUTANEOUS at 21:13

## 2020-11-17 RX ADMIN — OXYCODONE HYDROCHLORIDE 10 MILLIGRAM(S): 5 TABLET ORAL at 15:15

## 2020-11-17 RX ADMIN — Medication 15 MILLIGRAM(S): at 05:53

## 2020-11-17 RX ADMIN — HYDROMORPHONE HYDROCHLORIDE 0.5 MILLIGRAM(S): 2 INJECTION INTRAMUSCULAR; INTRAVENOUS; SUBCUTANEOUS at 01:53

## 2020-11-17 RX ADMIN — OXYCODONE HYDROCHLORIDE 10 MILLIGRAM(S): 5 TABLET ORAL at 21:14

## 2020-11-17 RX ADMIN — MORPHINE SULFATE 4 MILLIGRAM(S): 50 CAPSULE, EXTENDED RELEASE ORAL at 00:19

## 2020-11-17 RX ADMIN — OXYCODONE HYDROCHLORIDE 5 MILLIGRAM(S): 5 TABLET ORAL at 00:05

## 2020-11-17 RX ADMIN — HYDROMORPHONE HYDROCHLORIDE 0.5 MILLIGRAM(S): 2 INJECTION INTRAMUSCULAR; INTRAVENOUS; SUBCUTANEOUS at 04:49

## 2020-11-17 RX ADMIN — Medication 600 MILLIGRAM(S): at 00:05

## 2020-11-17 RX ADMIN — LIDOCAINE 1 PATCH: 4 CREAM TOPICAL at 10:00

## 2020-11-17 RX ADMIN — Medication 975 MILLIGRAM(S): at 18:35

## 2020-11-17 RX ADMIN — Medication 975 MILLIGRAM(S): at 00:05

## 2020-11-17 RX ADMIN — GABAPENTIN 100 MILLIGRAM(S): 400 CAPSULE ORAL at 21:14

## 2020-11-17 RX ADMIN — ONDANSETRON 4 MILLIGRAM(S): 8 TABLET, FILM COATED ORAL at 01:08

## 2020-11-17 RX ADMIN — SENNA PLUS 2 TABLET(S): 8.6 TABLET ORAL at 21:16

## 2020-11-17 RX ADMIN — HYDROMORPHONE HYDROCHLORIDE 1 MILLIGRAM(S): 2 INJECTION INTRAMUSCULAR; INTRAVENOUS; SUBCUTANEOUS at 10:50

## 2020-11-17 RX ADMIN — Medication 102 MILLIGRAM(S): at 05:53

## 2020-11-17 RX ADMIN — LIDOCAINE 1 PATCH: 4 CREAM TOPICAL at 06:27

## 2020-11-17 RX ADMIN — HYDROMORPHONE HYDROCHLORIDE 0.5 MILLIGRAM(S): 2 INJECTION INTRAMUSCULAR; INTRAVENOUS; SUBCUTANEOUS at 06:56

## 2020-11-17 NOTE — H&P ADULT - PROBLEM SELECTOR PLAN 1
Patient with hx of L4-L5 transforaminal lumbar interbody fusion on 9/20/2018, now with acute atraumatic low back pain. CT L spine showing post surgical changes, no ftx. No evidence of neuro deficits  - low suspicion for hardware infxn given no leukocytosis/fevers  - start oxycodone 5 q6 PRN/10 q6 for moderate and severe pain respectively  - tylenol 975 mg q6 standing  - start gabapentin 100 TID  - chronic pain consult call  - obtain MR T + L spine  - pending , would consider neurosx consult

## 2020-11-17 NOTE — H&P ADULT - HISTORY OF PRESENT ILLNESS
35F with PMH spinal fusion x2 years ago and papillary thyroid cancer s/p hemithyroidectomy who presents with severe back pain x 1 day. Patient endorses she has low grade  atraumatic back pain described as a stabbing sensation. Reports pain is similar to when she had her spinal fusion. Pain radiates around her waist. Endorsed Tylenol with no relief. Denies loss of bowel or bladder control or loss of sensation. 35F with PMH spinal fusion x2 years ago and papillary thyroid cancer s/p hemithyroidectomy who presents with severe back pain x 1 day. Patient endorses she has chronic low grade back pain at baseline. Yesterday after going to the bathroom she noted severe pain. She denies fall, trauma, or overly strenuous activity. She denies issues the urine or bowel incontinence. She is able to bear weight and ambulate however with difficulty due to the sciatica. She has only tried tylenol and tylneol DM at home. She has never had other therapies such as joint injection before. Denies fevers, chills, chest pain, headache, dyspnea, or dysuria.     In ER, given dexamethasone 10 mg x 1 (5AM), dilaudid 0.5 mg IVP at 2 AM, 3Am and 1 mg at 10 AM. CT noting extensive hardware but difficult to assess due to motion artifact.

## 2020-11-17 NOTE — H&P ADULT - NSHPPHYSICALEXAM_GEN_ALL_CORE
Vital Signs Last 24 Hrs  T(C): 36.6 (17 Nov 2020 06:11), Max: 37.1 (16 Nov 2020 20:59)  T(F): 97.9 (17 Nov 2020 06:11), Max: 98.7 (16 Nov 2020 20:59)  HR: 63 (17 Nov 2020 06:11) (63 - 86)  BP: 100/63 (17 Nov 2020 06:11) (100/63 - 112/74)  BP(mean): 83 (17 Nov 2020 01:54) (83 - 83)  RR: 16 (17 Nov 2020 06:11) (16 - 20)  SpO2: 98% (17 Nov 2020 06:11) (98% - 100%)

## 2020-11-17 NOTE — H&P ADULT - ASSESSMENT
35F with PMH spinal fusion x2 years ago and papillary thyroid cancer s/p hemithyroidectomy who presents with severe back pain x 1 day, unclear etiology 35F with PMH spinal fusion x2 years ago and papillary thyroid cancer s/p hemithyroidectomy who presents with severe back pain x 1 day

## 2020-11-18 ENCOUNTER — NON-APPOINTMENT (OUTPATIENT)
Age: 35
End: 2020-11-18

## 2020-11-18 DIAGNOSIS — I95.9 HYPOTENSION, UNSPECIFIED: ICD-10-CM

## 2020-11-18 PROCEDURE — 99221 1ST HOSP IP/OBS SF/LOW 40: CPT

## 2020-11-18 PROCEDURE — 99233 SBSQ HOSP IP/OBS HIGH 50: CPT

## 2020-11-18 RX ORDER — SIMETHICONE 80 MG/1
80 TABLET, CHEWABLE ORAL ONCE
Refills: 0 | Status: COMPLETED | OUTPATIENT
Start: 2020-11-18 | End: 2020-11-19

## 2020-11-18 RX ORDER — SODIUM CHLORIDE 9 MG/ML
1000 INJECTION INTRAMUSCULAR; INTRAVENOUS; SUBCUTANEOUS ONCE
Refills: 0 | Status: DISCONTINUED | OUTPATIENT
Start: 2020-11-18 | End: 2020-11-18

## 2020-11-18 RX ORDER — KETOROLAC TROMETHAMINE 30 MG/ML
15 SYRINGE (ML) INJECTION ONCE
Refills: 0 | Status: DISCONTINUED | OUTPATIENT
Start: 2020-11-18 | End: 2020-11-18

## 2020-11-18 RX ORDER — SODIUM CHLORIDE 9 MG/ML
1000 INJECTION INTRAMUSCULAR; INTRAVENOUS; SUBCUTANEOUS ONCE
Refills: 0 | Status: COMPLETED | OUTPATIENT
Start: 2020-11-18 | End: 2020-11-18

## 2020-11-18 RX ORDER — CYCLOBENZAPRINE HYDROCHLORIDE 10 MG/1
10 TABLET, FILM COATED ORAL ONCE
Refills: 0 | Status: COMPLETED | OUTPATIENT
Start: 2020-11-18 | End: 2020-11-18

## 2020-11-18 RX ORDER — METHOCARBAMOL 500 MG/1
750 TABLET, FILM COATED ORAL EVERY 6 HOURS
Refills: 0 | Status: DISCONTINUED | OUTPATIENT
Start: 2020-11-18 | End: 2020-11-19

## 2020-11-18 RX ADMIN — Medication 975 MILLIGRAM(S): at 19:40

## 2020-11-18 RX ADMIN — LIDOCAINE 1 PATCH: 4 CREAM TOPICAL at 19:52

## 2020-11-18 RX ADMIN — OXYCODONE HYDROCHLORIDE 10 MILLIGRAM(S): 5 TABLET ORAL at 19:52

## 2020-11-18 RX ADMIN — OXYCODONE HYDROCHLORIDE 10 MILLIGRAM(S): 5 TABLET ORAL at 06:49

## 2020-11-18 RX ADMIN — GABAPENTIN 100 MILLIGRAM(S): 400 CAPSULE ORAL at 21:12

## 2020-11-18 RX ADMIN — Medication 15 MILLIGRAM(S): at 14:05

## 2020-11-18 RX ADMIN — ENOXAPARIN SODIUM 40 MILLIGRAM(S): 100 INJECTION SUBCUTANEOUS at 12:34

## 2020-11-18 RX ADMIN — Medication 975 MILLIGRAM(S): at 06:49

## 2020-11-18 RX ADMIN — METHOCARBAMOL 750 MILLIGRAM(S): 500 TABLET, FILM COATED ORAL at 19:51

## 2020-11-18 RX ADMIN — GABAPENTIN 100 MILLIGRAM(S): 400 CAPSULE ORAL at 14:06

## 2020-11-18 RX ADMIN — ONDANSETRON 4 MILLIGRAM(S): 8 TABLET, FILM COATED ORAL at 19:52

## 2020-11-18 RX ADMIN — ONDANSETRON 4 MILLIGRAM(S): 8 TABLET, FILM COATED ORAL at 00:38

## 2020-11-18 RX ADMIN — Medication 975 MILLIGRAM(S): at 13:05

## 2020-11-18 RX ADMIN — PANTOPRAZOLE SODIUM 40 MILLIGRAM(S): 20 TABLET, DELAYED RELEASE ORAL at 05:51

## 2020-11-18 RX ADMIN — Medication 15 MILLIGRAM(S): at 14:20

## 2020-11-18 RX ADMIN — Medication 975 MILLIGRAM(S): at 05:51

## 2020-11-18 RX ADMIN — METHOCARBAMOL 750 MILLIGRAM(S): 500 TABLET, FILM COATED ORAL at 14:06

## 2020-11-18 RX ADMIN — CYCLOBENZAPRINE HYDROCHLORIDE 10 MILLIGRAM(S): 10 TABLET, FILM COATED ORAL at 09:02

## 2020-11-18 RX ADMIN — POLYETHYLENE GLYCOL 3350 17 GRAM(S): 17 POWDER, FOR SOLUTION ORAL at 05:52

## 2020-11-18 RX ADMIN — GABAPENTIN 100 MILLIGRAM(S): 400 CAPSULE ORAL at 05:51

## 2020-11-18 RX ADMIN — ONDANSETRON 4 MILLIGRAM(S): 8 TABLET, FILM COATED ORAL at 06:45

## 2020-11-18 RX ADMIN — Medication 975 MILLIGRAM(S): at 12:33

## 2020-11-18 RX ADMIN — Medication 975 MILLIGRAM(S): at 18:06

## 2020-11-18 RX ADMIN — Medication 975 MILLIGRAM(S): at 00:36

## 2020-11-18 RX ADMIN — Medication 137 MICROGRAM(S): at 05:51

## 2020-11-18 RX ADMIN — LIDOCAINE 1 PATCH: 4 CREAM TOPICAL at 07:36

## 2020-11-18 RX ADMIN — POLYETHYLENE GLYCOL 3350 17 GRAM(S): 17 POWDER, FOR SOLUTION ORAL at 18:07

## 2020-11-18 RX ADMIN — Medication 975 MILLIGRAM(S): at 01:30

## 2020-11-18 RX ADMIN — SENNA PLUS 2 TABLET(S): 8.6 TABLET ORAL at 21:13

## 2020-11-18 RX ADMIN — OXYCODONE HYDROCHLORIDE 10 MILLIGRAM(S): 5 TABLET ORAL at 05:51

## 2020-11-18 RX ADMIN — SODIUM CHLORIDE 1000 MILLILITER(S): 9 INJECTION INTRAMUSCULAR; INTRAVENOUS; SUBCUTANEOUS at 13:30

## 2020-11-18 RX ADMIN — OXYCODONE HYDROCHLORIDE 10 MILLIGRAM(S): 5 TABLET ORAL at 20:50

## 2020-11-18 NOTE — CHART NOTE - NSCHARTNOTEFT_GEN_A_CORE
Patient c/o severe sharp constnt lower back pain10/10 radiating to left buttocks and upper thigh and new towards the right side. Acute left-sided low back pain with left-sided sciatica with no relief from Flexaril 10mg given at 9AM. On oxycodone 5mg for moderate pain and 10mg for severe pain with no relief, Patient denies numbness or tingling, Continent of bowel and bladder. Able to ambulate to BR with PT but c/o severe pain more with transfers  No Neuro deficits  L spine showing post surgical changes, MRI thoracic and Lumbar on 11/17 shows Mild degenerative changes. No abnormal enhancement in the lumbar region.    Plan: Neurox consult called          Awaiting chronic pain consult    [id number] Patient c/o severe sharp constnt lower back pain10/10 radiating to left buttocks and upper thigh and new towards the right side. Acute left-sided low back pain with left-sided sciatica with no relief from Flexeril 10mg given at 9AM. On oxycodone 5mg for moderate pain and 10mg for severe pain with no relief, Patient denies numbness or tingling, Continent of bowel and bladder. Able to ambulate to BR with PT but c/o severe pain more with transfers  No Neuro deficits  Thoracic and Lumbar spine CT showing post surgical changes, MRI thoracic and Lumbar on 11/17 shows Mild degenerative changes. No abnormal enhancement in the lumbar region.    Plan: Neurox consult called          Awaiting chronic pain consult    [id number]

## 2020-11-18 NOTE — PROGRESS NOTE ADULT - ASSESSMENT
35F with PMH spinal fusion x2 years ago and papillary thyroid cancer s/p hemithyroidectomy who presents with severe back pain x 1 day

## 2020-11-18 NOTE — PHYSICAL THERAPY INITIAL EVALUATION ADULT - PLANNED THERAPY INTERVENTIONS, PT EVAL
transfer training/stair training; GOAL: Pt will negotiate up & down 12 stairs independently with unilateral HR & least restrictive AD, in 2 weeks./bed mobility training/gait training/balance training

## 2020-11-18 NOTE — PHYSICAL THERAPY INITIAL EVALUATION ADULT - DIAGNOSIS, PT EVAL
Pt p/w impaired strength, balance, and ROM due to LBP increased with activity, resulting in mobility deficits.

## 2020-11-18 NOTE — CONSULT NOTE ADULT - ASSESSMENT
35F PMHx L4-L5 spinal fusion x2 years ago ([facility]), papillary thyroid cancer s/p hemithyroidectomy   Admitted with severe low back pain with sciatica    Plan discussed with primary team:  Agree with Oxycodone 5 mg PO every 6 hours PRN moderate pain and 10 mg every 6 hours PRN severe pain  Consider Toradol 15 mg IV while unable to administer PO Oxy 2/2 hypotension - documented allergy but pt received in ER without issue; and/or IV Tylenol  Discontinue Flexeril  Start Robaxin 750 mg PO every 6 hours PRN spasm  Continue Lidoderm  Warm/cool packs for comfort  Bowel Regimen  Incentive Spirometer  PT per primary team  Monitor for sedation, respiratory depression  Out-patient pain practice list provided for pain management after discharge      Time spent on encounter:    30    Minutes    Chronic Pain Service  [phone]

## 2020-11-18 NOTE — PROGRESS NOTE ADULT - PROBLEM SELECTOR PLAN 4
Pt with episodes of hypotension ?in the setting of pain meds  give 1l ns bolus  dc flexaril, lidocaine patch   pt asymptomatic   pending pain management recs

## 2020-11-18 NOTE — CONSULT NOTE ADULT - SUBJECTIVE AND OBJECTIVE BOX
p ([id number])     HPI:  35F with PMH spinal fusion x2 years ago and papillary thyroid cancer s/p hemithyroidectomy who presents with severe back pain x 1 day. Patient endorses she has chronic low grade back pain at baseline. Yesterday after going to the bathroom she noted severe pain. She denies fall, trauma, or overly strenuous activity. She denies issues the urine or bowel incontinence. She is able to bear weight and ambulate however with difficulty due to the sciatica. She has only tried tylenol and tylneol DM at home. She has never had other therapies such as joint injection before. Denies fevers, chills, chest pain, headache, dyspnea, or dysuria.     In ER, given dexamethasone 10 mg x 1 (5AM), dilaudid 0.5 mg IVP at 2 AM, 3Am and 1 mg at 10 AM. CT noting extensive hardware but difficult to assess due to motion artifact.  (17 Nov 2020 13:58)      Imaging: MRI shows mild L4/5 neuroforaminal stenosis, CT shows mild osteophytic changes at that level    Exam: AAOx3, LAKHANI 5/5, SILT, tender to palp at low back    --Anticoagulation:  enoxaparin Injectable 40 milliGRAM(s) SubCutaneous daily    =====================  PAST MEDICAL HISTORY   Paresthesia of right leg    Lumbar herniated disc    Acid reflux    Irregular menstruation    Thyroid cancer    Herniated nucleus pulposus, L4-5      PAST SURGICAL HISTORY   S/P partial thyroidectomy    H/O microdiscectomy    No significant past surgical history    No significant past surgical history    No significant past surgical history      tramadol (Pruritus)  Voltaren (Flushing; Urticaria; Rash; Swelling)  Voltaren (Pruritus; Hives)      MEDICATIONS:  Antibiotics:    Neuro:  acetaminophen   Tablet .. 975 milliGRAM(s) Oral every 6 hours  gabapentin 100 milliGRAM(s) Oral three times a day  ketorolac   Injectable 15 milliGRAM(s) IV Push once  methocarbamol 750 milliGRAM(s) Oral every 6 hours  ondansetron Injectable 4 milliGRAM(s) IV Push every 6 hours PRN  oxyCODONE    IR 5 milliGRAM(s) Oral every 4 hours PRN  oxyCODONE    IR 10 milliGRAM(s) Oral every 6 hours PRN    Other:  influenza   Vaccine 0.5 milliLiter(s) IntraMuscular once  levothyroxine 137 MICROGram(s) Oral daily  pantoprazole    Tablet 40 milliGRAM(s) Oral before breakfast  polyethylene glycol 3350 17 Gram(s) Oral two times a day  senna 2 Tablet(s) Oral at bedtime      SOCIAL HISTORY:   Occupation:   Marital Status:     FAMILY HISTORY:  Family history of thyroid cancer    No pertinent family history in first degree relatives    No pertinent family history in first degree relatives        ROS: Negative except per HPI    LABS:                          12.7   7.70  )-----------( 244      ( 17 Nov 2020 00:38 )             41.2     11-17    140  |  105  |  14  ----------------------------<  90  4.0   |  23  |  0.69    Ca    9.0      17 Nov 2020 00:38    TPro  7.1  /  Alb  4.3  /  TBili  0.3  /  DBili  x   /  AST  15  /  ALT  21  /  AlkPhos  61  11-17

## 2020-11-18 NOTE — PHYSICAL THERAPY INITIAL EVALUATION ADULT - ADDITIONAL COMMENTS
THORACIC SPINE MRI: No spinal canal stenosis or neural foraminal narrowing. No spinal cord compression or abnormal intrinsic cord signal. No abnormal enhancement in the thoracic region.  LUMBAR SPINE MRI: Redemonstration of total L4 laminectomy and posterior fusion of L4 and L5. Susceptibility artifact from spinal hardware limits evaluation of the L4 and L5 vertebral bodies, as well as the spinal canal at those levels. Mild degenerative changes. No abnormal enhancement in the lumbar region.

## 2020-11-18 NOTE — PROGRESS NOTE ADULT - SUBJECTIVE AND OBJECTIVE BOX
Patient is a 35y old  Female who presents with a chief complaint of back pain (17 Nov 2020 13:58)      SUBJECTIVE / OVERNIGHT EVENTS: reports she is having severe back pain exacerbated when she walked to the bathroom, no cp, sob, abdominal pain     MEDICATIONS  (STANDING):  acetaminophen   Tablet .. 975 milliGRAM(s) Oral every 6 hours  enoxaparin Injectable 40 milliGRAM(s) SubCutaneous daily  gabapentin 100 milliGRAM(s) Oral three times a day  influenza   Vaccine 0.5 milliLiter(s) IntraMuscular once  levothyroxine 137 MICROGram(s) Oral daily  lidocaine   Patch 1 Patch Transdermal daily  pantoprazole    Tablet 40 milliGRAM(s) Oral before breakfast  polyethylene glycol 3350 17 Gram(s) Oral two times a day  senna 2 Tablet(s) Oral at bedtime    MEDICATIONS  (PRN):  ondansetron Injectable 4 milliGRAM(s) IV Push every 6 hours PRN Nausea and/or Vomiting  oxyCODONE    IR 5 milliGRAM(s) Oral every 4 hours PRN Moderate Pain (4 - 6)  oxyCODONE    IR 10 milliGRAM(s) Oral every 6 hours PRN Severe Pain (7 - 10)        CAPILLARY BLOOD GLUCOSE        I&O's Summary    17 Nov 2020 07:01  -  18 Nov 2020 07:00  --------------------------------------------------------  IN: 360 mL / OUT: 0 mL / NET: 360 mL        PHYSICAL EXAM:  GENERAL: NAD, well-developed  HEAD:  Atraumatic, Normocephalic  EYES: conjunctiva and sclera clear  NECK:  No JVD  CHEST/LUNG: Clear to auscultation bilaterally; No wheeze  HEART: Regular rate and rhythm; S1S2  ABDOMEN: Soft, Nontender, Nondistended; Bowel sounds present  EXTREMITIES:  2+ Peripheral Pulses, trace edema ankles bl  PSYCH: AAOx3      LABS:                        12.7   7.70  )-----------( 244      ( 17 Nov 2020 00:38 )             41.2     11-17    140  |  105  |  14  ----------------------------<  90  4.0   |  23  |  0.69    Ca    9.0      17 Nov 2020 00:38    TPro  7.1  /  Alb  4.3  /  TBili  0.3  /  DBili  x   /  AST  15  /  ALT  21  /  AlkPhos  61  11-17              RADIOLOGY & ADDITIONAL TESTS:    Imaging Personally Reviewed:    Consultant(s) Notes Reviewed:      Care Discussed with Consultants/Other Providers:

## 2020-11-18 NOTE — CONSULT NOTE ADULT - SUBJECTIVE AND OBJECTIVE BOX
Chief Complaint:  Patient is a 35y old  Female who presents with a chief complaint of back pain (18 Nov 2020 13:20)    HPI:  35F with PMH spinal fusion x2 years ago and papillary thyroid cancer s/p hemithyroidectomy who presents with severe back pain x 1 day. Patient endorses she has chronic low grade back pain at baseline. Yesterday after going to the bathroom she noted severe pain. She denies fall, trauma, or overly strenuous activity. She denies issues the urine or bowel incontinence. She is able to bear weight and ambulate however with difficulty due to the sciatica. She has only tried tylenol and tylneol DM at home. She has never had other therapies such as joint injection before. Denies fevers, chills, chest pain, headache, dyspnea, or dysuria.  (17 Nov 2020 13:58)    Current out- patient pain regimen: None    Out Patient Pain Management provider: None    [facility] Prescription Monitoring Program: Reference #[id number]    Opioid Risk Tool (ORT-OUD) Score: Moderate  father EtOH  Patient uses recreational Marijuana 1-2x/mo    Pain Score: 9/10    Pt seen lying in bed, eating lunch, appears moderately uncomfortable. Endorses history of L4-L5 PLIF. Exam limited 2/2 inability to move/guarding 2/2 pain. C/O low back pain radiating to B/L buttocks and down LLE. Is able to ambulate but endorses difficulty 2/2 pain and weakness LLE. The pain "shoots up" to low back when weight bears on left foot, somewhat improved if she rotates foot and puts weight on lateral aspect. Also c/o left thigh spasm. Denies Burning/paresthesias. Does not experience pain at home usually, this is new but very much like the pain she had prior to the fusion surgery. Pt is sleepy (recently took Flexeril) and states her BP is low, so they are unable to give her pain medication. Denies any other issues.    PHYSICAL EXAM  GENERAL: Moderate distress, well-groomed, well-developed  NEURO: Sleepy but awake & Oriented X3, Good concentration  HEENT: Head normocephalic; PERRLA; speech clear and fluent  RESP: Lungs Clear to auscultation bilaterally; no rales or rhonchi; chest expansion equal  CV: Regular rate and rhythm  GI/: Last BM 2 days ago, voiding without issue, appetite fair.  EXTREMITIES: 2+ Peripheral Pulses, No cyanosis or edema; moves all extremities equally against gravity  MSK: Motor Strength 5/5 B/L upper and lower extremities; exam limited 2/2 pain and guarding  PSYCH: affect normal; good eye contact; no signs of depression or anxiety    PAST MEDICAL & SURGICAL HISTORY:  Paresthesia of right leg    Lumbar herniated disc    Acid reflux    Irregular menstruation    Thyroid cancer  s/p partial thyroidectomy 2017    Herniated nucleus pulposus, L4-5  H/O microdiscectomy  11/2016    S/P partial thyroidectomy  2017    H/O microdiscectomy  11/2016        FAMILY HISTORY:  Family history of thyroid cancer        Allergies    tramadol (Pruritus)  Voltaren (Flushing; Urticaria; Rash; Swelling)  Voltaren (Pruritus; Hives)      MEDICATIONS  (STANDING):  acetaminophen   Tablet .. 975 milliGRAM(s) Oral every 6 hours  enoxaparin Injectable 40 milliGRAM(s) SubCutaneous daily  gabapentin 100 milliGRAM(s) Oral three times a day  influenza   Vaccine 0.5 milliLiter(s) IntraMuscular once  ketorolac   Injectable 15 milliGRAM(s) IV Push once  levothyroxine 137 MICROGram(s) Oral daily  lidocaine   Patch 1 Patch Transdermal daily  methocarbamol 750 milliGRAM(s) Oral every 6 hours  pantoprazole    Tablet 40 milliGRAM(s) Oral before breakfast  polyethylene glycol 3350 17 Gram(s) Oral two times a day  senna 2 Tablet(s) Oral at bedtime    MEDICATIONS  (PRN):  ondansetron Injectable 4 milliGRAM(s) IV Push every 6 hours PRN Nausea and/or Vomiting  oxyCODONE    IR 5 milliGRAM(s) Oral every 4 hours PRN Moderate Pain (4 - 6)  oxyCODONE    IR 10 milliGRAM(s) Oral every 6 hours PRN Severe Pain (7 - 10)      Vital Signs:  T(C): 36.7 (11-18-20 @ 12:23)  HR: 66 (11-18-20 @ 13:23)  BP: 97/63 (11-18-20 @ 13:23)  RR: 16 (11-18-20 @ 12:23)  SpO2: 100% (11-18-20 @ 12:23)    Pertinent labs/radiology:  Reviewed                          12.7   7.70  )-----------( 244      ( 17 Nov 2020 00:38 )             41.2       11-17    140  |  105  |  14  ----------------------------<  90  4.0   |  23  |  0.69    Ca    9.0      17 Nov 2020 00:38    TPro  7.1  /  Alb  4.3  /  TBili  0.3  /  DBili  x   /  AST  15  /  ALT  21  /  AlkPhos  61  11-17      < from: MR Lumbar Spine w/wo IV Cont (11.17.20 @ 18:17) >  LUMBAR SPINE MRI:    Redemonstration of total L4 laminectomy and posterior fusion of L4 and L5. Susceptibility artifact from spinal hardware limits evaluation of the L4 and L5 vertebral bodies,as well as the spinal canal at those levels.    Remaining lumbar and visualized sacral vertebral bodies demonstrate normal height, alignment, and marrow signal homogeneity. Disc space narrowing at L4-L5 and to a lesser degree at L2-L3. The conus is normal in size, position, and signal characteristics, ending at L1-L2.    No abnormal enhancement in the lumbar region.    T12-L1: No spinal canal stenosis or neural foraminal narrowing.    L1-L2: No spinal canal stenosis or neural foraminal narrowing.    L2-L3: No spinal canal stenosis or neural foraminal narrowing.    L3-L4: Broad-based disc protrusion deforms the ventral thecal sac. No neural foraminal narrowing.    L4-L5: Poorly evaluated due to susceptibility artifact. Central disc protrusion deforms the ventral thecal sac. No gross evidence for neural foraminal narrowing.    L5-S1: No spinal canal stenosis or neural foraminal narrowing.    IMPRESSION:    THORACIC SPINE MRI:  No spinal canal stenosis or neural foraminal narrowing.  No spinal cord compression or abnormal intrinsic cord signal.  No abnormal enhancement in the thoracic region.    LUMBAR SPINE MRI:  Redemonstration of total L4 laminectomy and posterior fusion of L4 and L5. Susceptibility artifact from spinal hardware limits evaluation of the L4 and L5 vertebral bodies, as well as the spinal canal at those levels.    Mild degenerative changes.  No abnormal enhancement in the lumbar region.    < end of copied text >

## 2020-11-18 NOTE — PHYSICAL THERAPY INITIAL EVALUATION ADULT - LIVES WITH, PROFILE
Pt lives with family in home with ~5 stairs to entrance, and +flight done to basement +HR. Prior to admission pt independent with all functional mobility including ambulation without AD.

## 2020-11-18 NOTE — PHYSICAL THERAPY INITIAL EVALUATION ADULT - MANUAL MUSCLE TESTING RESULTS, REHAB EVAL
BUE/BLE grossly at least 3-/5 throughout , not formally assessed, limited due to pain/grossly assessed due to

## 2020-11-18 NOTE — CONSULT NOTE ADULT - ASSESSMENT
Mitali Banegas    35F s/p L4/5 TLIF with Dr. Wooten 2 years ago p/w LBP. Denies B/B symptoms, radicular pain, numbness, tingling. Pain is worst at midline LB, left buttocks, with L Leg spasm. Imaging: MRI shows mild L4/5 neuroforaminal stenosis, CT shows mild osteophytic changes at that level. Exam: AAOx3, LAKHANI 5/5, SILT, tender to palp at low back,  -No neurosurgical intervention, pain not consisistent with neuroforaminal stenosis,  -Recommend continued medical management for pain  -PT Mitali Banegas    35F s/p L4/5 TLIF with Dr. Wooten 2 years ago p/w LBP. Denies B/B symptoms, radicular pain, numbness, tingling. Pain is worst at midline LB, left buttocks, with L Leg spasm. Imaging: MRI shows mild L4/5 neuroforaminal stenosis, CT shows mild osteophytic changes at that level. Exam: AAOx3, LAKHANI 5/5, SILT, tender to palp at low back,  -No neurosurgical intervention, pain not consisistent with neuroforaminal stenosis,  -Recommend continued medical management for pain  -PT   -F/U with Dr. Wooten in 2-4 weeks outpatient

## 2020-11-18 NOTE — PROGRESS NOTE ADULT - PROBLEM SELECTOR PLAN 1
Patient with hx of L4-L5 transforaminal lumbar interbody fusion on 9/20/2018, now with acute atraumatic low back pain. CT L spine showing post surgical changes, no ftx. No evidence of neuro deficits  low suspicion for hardware infxn given no leukocytosis/fevers  start oxycodone 5 q6 PRN/10 q6 for moderate and severe pain respectively  tylenol 975 mg q6 standing  gabapentin 100 TID  chronic pain consult   MR T + L spine noted   pending neurosx eval

## 2020-11-18 NOTE — PHYSICAL THERAPY INITIAL EVALUATION ADULT - ACTIVE RANGE OF MOTION EXAMINATION, REHAB EVAL
not formally assessed, limited due to pain/bilateral  lower extremity Active ROM was WFL (within functional limits)/bilateral upper extremity Active ROM was WFL (within functional limits)

## 2020-11-18 NOTE — PHYSICAL THERAPY INITIAL EVALUATION ADULT - PERTINENT HX OF CURRENT PROBLEM, REHAB EVAL
35F with PMH spinal fusion x2 years prior, papillary thyroid cancer s/p hemithyroidectomy; now p/w severe back pain x 1 day. Pt endorses chronic low grade back pain at baseline. Pt denies fall, trauma, or overly strenuous activity; denies  urine or bowel incontinence.

## 2020-11-18 NOTE — PHYSICAL THERAPY INITIAL EVALUATION ADULT - CRITERIA FOR SKILLED THERAPEUTIC INTERVENTIONS
therapy frequency/anticipated discharge recommendation/functional limitations in following categories/rehab potential/impairments found/predicted duration of therapy intervention/anticipated equipment needs at discharge

## 2020-11-19 ENCOUNTER — TRANSCRIPTION ENCOUNTER (OUTPATIENT)
Age: 35
End: 2020-11-19

## 2020-11-19 LAB
ANION GAP SERPL CALC-SCNC: 9 MMOL/L — SIGNIFICANT CHANGE UP (ref 5–17)
APPEARANCE UR: CLEAR — SIGNIFICANT CHANGE UP
BILIRUB UR-MCNC: NEGATIVE — SIGNIFICANT CHANGE UP
BUN SERPL-MCNC: 13 MG/DL — SIGNIFICANT CHANGE UP (ref 7–23)
CALCIUM SERPL-MCNC: 8.1 MG/DL — LOW (ref 8.4–10.5)
CHLORIDE SERPL-SCNC: 106 MMOL/L — SIGNIFICANT CHANGE UP (ref 96–108)
CO2 SERPL-SCNC: 26 MMOL/L — SIGNIFICANT CHANGE UP (ref 22–31)
COLOR SPEC: SIGNIFICANT CHANGE UP
CREAT SERPL-MCNC: 0.67 MG/DL — SIGNIFICANT CHANGE UP (ref 0.5–1.3)
DIFF PNL FLD: NEGATIVE — SIGNIFICANT CHANGE UP
GLUCOSE SERPL-MCNC: 91 MG/DL — SIGNIFICANT CHANGE UP (ref 70–99)
GLUCOSE UR QL: NEGATIVE — SIGNIFICANT CHANGE UP
HCT VFR BLD CALC: 38.7 % — SIGNIFICANT CHANGE UP (ref 34.5–45)
HGB BLD-MCNC: 12 G/DL — SIGNIFICANT CHANGE UP (ref 11.5–15.5)
KETONES UR-MCNC: NEGATIVE — SIGNIFICANT CHANGE UP
LEUKOCYTE ESTERASE UR-ACNC: NEGATIVE — SIGNIFICANT CHANGE UP
MCHC RBC-ENTMCNC: 27.5 PG — SIGNIFICANT CHANGE UP (ref 27–34)
MCHC RBC-ENTMCNC: 31 GM/DL — LOW (ref 32–36)
MCV RBC AUTO: 88.8 FL — SIGNIFICANT CHANGE UP (ref 80–100)
NITRITE UR-MCNC: NEGATIVE — SIGNIFICANT CHANGE UP
NRBC # BLD: 0 /100 WBCS — SIGNIFICANT CHANGE UP (ref 0–0)
PH UR: 6.5 — SIGNIFICANT CHANGE UP (ref 5–8)
PLATELET # BLD AUTO: 205 K/UL — SIGNIFICANT CHANGE UP (ref 150–400)
POTASSIUM SERPL-MCNC: 4.7 MMOL/L — SIGNIFICANT CHANGE UP (ref 3.5–5.3)
POTASSIUM SERPL-SCNC: 4.7 MMOL/L — SIGNIFICANT CHANGE UP (ref 3.5–5.3)
PROT UR-MCNC: NEGATIVE — SIGNIFICANT CHANGE UP
RBC # BLD: 4.36 M/UL — SIGNIFICANT CHANGE UP (ref 3.8–5.2)
RBC # FLD: 13.4 % — SIGNIFICANT CHANGE UP (ref 10.3–14.5)
SODIUM SERPL-SCNC: 141 MMOL/L — SIGNIFICANT CHANGE UP (ref 135–145)
SP GR SPEC: 1.02 — SIGNIFICANT CHANGE UP (ref 1.01–1.02)
UROBILINOGEN FLD QL: SIGNIFICANT CHANGE UP
WBC # BLD: 8.6 K/UL — SIGNIFICANT CHANGE UP (ref 3.8–10.5)
WBC # FLD AUTO: 8.6 K/UL — SIGNIFICANT CHANGE UP (ref 3.8–10.5)

## 2020-11-19 PROCEDURE — 99233 SBSQ HOSP IP/OBS HIGH 50: CPT

## 2020-11-19 PROCEDURE — 99231 SBSQ HOSP IP/OBS SF/LOW 25: CPT

## 2020-11-19 RX ORDER — METHOCARBAMOL 500 MG/1
1000 TABLET, FILM COATED ORAL EVERY 6 HOURS
Refills: 0 | Status: DISCONTINUED | OUTPATIENT
Start: 2020-11-20 | End: 2020-11-20

## 2020-11-19 RX ORDER — OXYCODONE HYDROCHLORIDE 5 MG/1
15 TABLET ORAL EVERY 6 HOURS
Refills: 0 | Status: DISCONTINUED | OUTPATIENT
Start: 2020-11-19 | End: 2020-11-20

## 2020-11-19 RX ORDER — CELECOXIB 200 MG/1
200 CAPSULE ORAL DAILY
Refills: 0 | Status: DISCONTINUED | OUTPATIENT
Start: 2020-11-19 | End: 2020-11-20

## 2020-11-19 RX ORDER — METHOCARBAMOL 500 MG/1
1000 TABLET, FILM COATED ORAL EVERY 6 HOURS
Refills: 0 | Status: COMPLETED | OUTPATIENT
Start: 2020-11-19 | End: 2020-11-20

## 2020-11-19 RX ORDER — SODIUM CHLORIDE 9 MG/ML
1000 INJECTION INTRAMUSCULAR; INTRAVENOUS; SUBCUTANEOUS ONCE
Refills: 0 | Status: COMPLETED | OUTPATIENT
Start: 2020-11-19 | End: 2020-11-19

## 2020-11-19 RX ADMIN — ONDANSETRON 4 MILLIGRAM(S): 8 TABLET, FILM COATED ORAL at 10:40

## 2020-11-19 RX ADMIN — LIDOCAINE 1 PATCH: 4 CREAM TOPICAL at 23:37

## 2020-11-19 RX ADMIN — Medication 975 MILLIGRAM(S): at 11:04

## 2020-11-19 RX ADMIN — OXYCODONE HYDROCHLORIDE 10 MILLIGRAM(S): 5 TABLET ORAL at 10:40

## 2020-11-19 RX ADMIN — PANTOPRAZOLE SODIUM 40 MILLIGRAM(S): 20 TABLET, DELAYED RELEASE ORAL at 06:09

## 2020-11-19 RX ADMIN — OXYCODONE HYDROCHLORIDE 15 MILLIGRAM(S): 5 TABLET ORAL at 20:59

## 2020-11-19 RX ADMIN — Medication 975 MILLIGRAM(S): at 06:09

## 2020-11-19 RX ADMIN — POLYETHYLENE GLYCOL 3350 17 GRAM(S): 17 POWDER, FOR SOLUTION ORAL at 06:09

## 2020-11-19 RX ADMIN — SIMETHICONE 80 MILLIGRAM(S): 80 TABLET, CHEWABLE ORAL at 00:05

## 2020-11-19 RX ADMIN — Medication 975 MILLIGRAM(S): at 11:30

## 2020-11-19 RX ADMIN — ENOXAPARIN SODIUM 40 MILLIGRAM(S): 100 INJECTION SUBCUTANEOUS at 11:04

## 2020-11-19 RX ADMIN — GABAPENTIN 100 MILLIGRAM(S): 400 CAPSULE ORAL at 21:00

## 2020-11-19 RX ADMIN — METHOCARBAMOL 750 MILLIGRAM(S): 500 TABLET, FILM COATED ORAL at 11:04

## 2020-11-19 RX ADMIN — METHOCARBAMOL 1000 MILLIGRAM(S): 500 TABLET, FILM COATED ORAL at 23:36

## 2020-11-19 RX ADMIN — OXYCODONE HYDROCHLORIDE 10 MILLIGRAM(S): 5 TABLET ORAL at 02:55

## 2020-11-19 RX ADMIN — ONDANSETRON 4 MILLIGRAM(S): 8 TABLET, FILM COATED ORAL at 01:55

## 2020-11-19 RX ADMIN — LIDOCAINE 1 PATCH: 4 CREAM TOPICAL at 19:25

## 2020-11-19 RX ADMIN — Medication 975 MILLIGRAM(S): at 06:39

## 2020-11-19 RX ADMIN — Medication 137 MICROGRAM(S): at 06:09

## 2020-11-19 RX ADMIN — METHOCARBAMOL 750 MILLIGRAM(S): 500 TABLET, FILM COATED ORAL at 06:09

## 2020-11-19 RX ADMIN — CELECOXIB 200 MILLIGRAM(S): 200 CAPSULE ORAL at 17:12

## 2020-11-19 RX ADMIN — OXYCODONE HYDROCHLORIDE 10 MILLIGRAM(S): 5 TABLET ORAL at 01:56

## 2020-11-19 RX ADMIN — Medication 975 MILLIGRAM(S): at 17:30

## 2020-11-19 RX ADMIN — METHOCARBAMOL 1000 MILLIGRAM(S): 500 TABLET, FILM COATED ORAL at 17:12

## 2020-11-19 RX ADMIN — LIDOCAINE 1 PATCH: 4 CREAM TOPICAL at 11:03

## 2020-11-19 RX ADMIN — SENNA PLUS 2 TABLET(S): 8.6 TABLET ORAL at 21:00

## 2020-11-19 RX ADMIN — Medication 975 MILLIGRAM(S): at 01:00

## 2020-11-19 RX ADMIN — GABAPENTIN 100 MILLIGRAM(S): 400 CAPSULE ORAL at 06:09

## 2020-11-19 RX ADMIN — OXYCODONE HYDROCHLORIDE 15 MILLIGRAM(S): 5 TABLET ORAL at 21:55

## 2020-11-19 RX ADMIN — METHOCARBAMOL 750 MILLIGRAM(S): 500 TABLET, FILM COATED ORAL at 00:04

## 2020-11-19 RX ADMIN — GABAPENTIN 100 MILLIGRAM(S): 400 CAPSULE ORAL at 13:27

## 2020-11-19 RX ADMIN — ONDANSETRON 4 MILLIGRAM(S): 8 TABLET, FILM COATED ORAL at 22:40

## 2020-11-19 RX ADMIN — SODIUM CHLORIDE 1000 MILLILITER(S): 9 INJECTION INTRAMUSCULAR; INTRAVENOUS; SUBCUTANEOUS at 13:27

## 2020-11-19 RX ADMIN — Medication 975 MILLIGRAM(S): at 23:36

## 2020-11-19 RX ADMIN — Medication 975 MILLIGRAM(S): at 00:04

## 2020-11-19 RX ADMIN — Medication 975 MILLIGRAM(S): at 17:13

## 2020-11-19 NOTE — DISCHARGE NOTE PROVIDER - CARE PROVIDER_API CALL
Cyrus Wooten  NEUROSURGERY  [address] [place] [address], [facility] [address]  [place] [address]  Phone: [phone]  Fax: [phone]  Follow Up Time: 2 weeks

## 2020-11-19 NOTE — PROGRESS NOTE ADULT - PROBLEM SELECTOR PLAN 1
Patient with hx of L4-L5 transforaminal lumbar interbody fusion on 9/20/2018, now with acute atraumatic low back pain. CT L spine showing post surgical changes, no ftx. No evidence of neuro deficits  Pain management recs:  Increase Oxycodone to 15 mg PO every 6 hours PRN severe pain while in patient to limit Tylenol intake, for discharge please order Percocet 15 mg every 6 hours  Start Celebrex 200 mg PO daily (first dose now)  Increase Robaxin to 1000 mg PO every 6 hours, order standing for 24 hrs then change to PRN spasm  Continue Gabapentin 100 mg every 8 hours  Lidoderm  Warm/cool packs for comfort  MR T + L spine noted   no surgical intervention   outpt PT  likely dc in AM

## 2020-11-19 NOTE — DISCHARGE NOTE PROVIDER - HOSPITAL COURSE
35F PMH Herniated disc s/p microdiscectomy in 11/2016, spinal fusion (L4-L5 transforaminal lumbar interbody fusion on 9/20/2018), and papillary thyroid CA s/p hemithyroidectomy presents with atraumatic severe back pain x 1 day. Denies bladder and bladder symptoms, radicular pain, numbness, tingling. Pain is worst at midline LB, left buttocks, with L Leg spasm.   Imaging: MRI shows mild L4/5 neuroforaminal stenosis, CT L spine showing post surgical changes, mild osteophytic changes at that level. Low suspicion for hardware infection given no leukocytosis/fevers  Neurosurgery consulted - Exam: AAOx3, LAKHANI 5/5, SILT, tender to palp at low back. No neurosurgical intervention, pain not consistent with neuroforaminal stenosis,  -Recommended medical management for pain. Pain management consulted and pain meds adjusted to comfort. On discharge------------------  -PT rec outpatient PT  Follow up with Dr. Wooten in 2-4 weeks outpatient. 35F PMH Herniated disc s/p microdiscectomy in 11/2016, spinal fusion (L4-L5 transforaminal lumbar interbody fusion on 9/20/2018), and papillary thyroid CA s/p hemithyroidectomy presents with atraumatic severe back pain x 1 day. Denies bladder and bladder symptoms, radicular pain, numbness, tingling. Pain is worst at midline LB, left buttocks, with L Leg spasm.   Imaging: MRI shows mild L4/5 neuroforaminal stenosis, CT L spine showing post surgical changes, mild osteophytic changes at that level. Low suspicion for hardware infection given no leukocytosis/fevers  Neurosurgery consulted - Exam: AAOx3, LAKHANI 5/5, SILT, tender to palp at low back. No neurosurgical intervention, pain not consistent with neuroforaminal stenosis,  -Recommended medical management for pain. Pain management consulted and pain meds adjusted to comfort. Discharged on Percocet 10/325 mg 5 x day PRN, Celebrex 200 mg PO daily, Robaxin to 1000 mg PO every 6 hours PRN, Gabapentin 100 mg every 8 hours  Follow up with Dr. Wooten in 2-4 weeks outpatient and pain management 35F PMH Herniated disc s/p microdiscectomy in 11/2016, spinal fusion (L4-L5 transforaminal lumbar interbody fusion on 9/20/2018), and papillary thyroid CA s/p hemithyroidectomy presents with atraumatic severe back pain x 1 day. Denies bladder and bladder symptoms, radicular pain, numbness, tingling. Pain is worst at midline LB, left buttocks, with L Leg spasm.   Imaging: MRI shows mild L4/5 neuroforaminal stenosis, CT L spine showing post surgical changes, mild osteophytic changes at that level. Low suspicion for hardware infection given no leukocytosis/fevers  Neurosurgery consulted - Exam: AAOx3, LAKHANI 5/5, SILT, tender to palp at low back. No neurosurgical intervention, pain not consistent with neuroforaminal stenosis,  -Recommended medical management for pain. Pain management consulted and pain meds adjusted to comfort. Discharged on Percocet 10/325 mg 5 x day PRN, Celebrex 200 mg PO daily, Robaxin to 1000 mg PO every 6 hours PRN, Gabapentin 100 mg every 8 hours. Follow up with Dr. Wooten in 2-4 weeks outpatient and pain management

## 2020-11-19 NOTE — DISCHARGE NOTE PROVIDER - NSDCCPCAREPLAN_GEN_ALL_CORE_FT
PRINCIPAL DISCHARGE DIAGNOSIS  Diagnosis: Back pain  Assessment and Plan of Treatment: MRI shows mild L4/5 neuroforaminal stenosis  CT L spine showing post surgical changes, mild osteophytic changes at that level.   Low suspicion for hardware infection given no fevers or rise in WBCs  Per Neurosurgery - pain not consistent with spinal stenosis,  Continue pain control with prescribed pain meds  -PT recommends outpatient PT  Increase activity as tolerated  Follow up with Dr. Wooten in 2-4 weeks outpatient.      SECONDARY DISCHARGE DIAGNOSES  Diagnosis: Papillary thyroid carcinoma  Assessment and Plan of Treatment: Patient with papillary thyroid Ca s/p hemithyroidectomy   Conitnue synthroid 137.       Diagnosis: Hypotension  Assessment and Plan of Treatment: Pt with episodes of hypotension likley due to pain meds  Given IVF  You had no symptoms with soft blood pressures    Diagnosis: GERD (gastroesophageal reflux disease)  Assessment and Plan of Treatment: Started pantoprazole daily for reflux and dyspepsia  Measures to follow to avoid symptoms are lose weight (if you are overweight), raise the head of your bed by 6 to 8 inches, avoid foods that make your symptoms worse like coffee, chocolate, alcohol, fatty foods.  Seek medical if your symptoms are severe or last a long time.        PRINCIPAL DISCHARGE DIAGNOSIS  Diagnosis: Back pain  Assessment and Plan of Treatment: MRI shows mild L4/5 neuroforaminal stenosis  CT L spine showing post surgical changes, mild osteophytic changes at that level.   Low suspicion for hardware infection given no fevers or rise in WBCs  Per Neurosurgery - pain not consistent with spinal stenosis,  Continue pain control with prescribed pain meds  -PT recommends outpatient PT  Increase activity as tolerated  Follow up with Dr. Wooten in 2-4 weeks and Out-patient pain practice (list provided for discharge)      SECONDARY DISCHARGE DIAGNOSES  Diagnosis: Papillary thyroid carcinoma  Assessment and Plan of Treatment: Patient with papillary thyroid Ca s/p hemithyroidectomy   Conitnue synthroid 137.       Diagnosis: Hypotension  Assessment and Plan of Treatment: Pt with episodes of hypotension likley due to pain meds  Given IVF  You had no symptoms with soft blood pressures    Diagnosis: GERD (gastroesophageal reflux disease)  Assessment and Plan of Treatment: Started pantoprazole daily for reflux and dyspepsia  Measures to follow to avoid symptoms are lose weight (if you are overweight), raise the head of your bed by 6 to 8 inches, avoid foods that make your symptoms worse like coffee, chocolate, alcohol, fatty foods.  Seek medical if your symptoms are severe or last a long time.

## 2020-11-19 NOTE — PROGRESS NOTE ADULT - ASSESSMENT
35F PMHx L4-L5 spinal fusion x2 years ago (Latefi), papillary thyroid cancer s/p hemithyroidectomy   Admitted with severe low back pain with sciatica    Plan discussed with primary team:  Increase Oxycodone to 15 mg PO every 6 hours PRN severe pain while in patient to limit Tylenol intake, for discharge please order Percocet 15 mg every 6 hours  Start Celebrex 200 mg PO daily (first dose now)  Increase Robaxin to 1000 mg PO every 6 hours, order standing for 24 hrs then change to PRN spasm  Continue Gabapentin 100 mg every 8 hours  Lidoderm  Warm/cool packs for comfort  Bowel Regimen  Incentive Spirometer  PT per primary team - pt very concerned about ability to ambulate and deal with pain, lives in basement, stairs, no help  Monitor for sedation, respiratory depression  Out-patient pain practice list provided for pain management after discharge      Time spent on encounter:    30    Minutes    Chronic Pain Service  [phone] 35F PMHx L4-L5 spinal fusion x2 years ago (Latefi), papillary thyroid cancer s/p hemithyroidectomy   Admitted with severe low back pain with sciatica    Possible Post-Lumbar Laminectomy Syndrome - pt encouraged to see out patient interventional pain specialist for possible caudal epidural or other intervention    Plan discussed with primary team:  Increase Oxycodone to 15 mg PO every 6 hours PRN severe pain while in patient to limit Tylenol intake, for discharge please order Percocet 15 mg every 6 hours  Start Celebrex 200 mg PO daily (first dose now)  Increase Robaxin to 1000 mg PO every 6 hours, order standing for 24 hrs then change to PRN spasm  Continue Gabapentin 100 mg every 8 hours  Lidoderm  Warm/cool packs for comfort  Bowel Regimen  Incentive Spirometer  PT per primary team - pt very concerned about ability to ambulate and deal with pain, lives in basement, stairs, no help  Monitor for sedation, respiratory depression  Out-patient pain practice list provided for pain management after discharge      Time spent on encounter:    30    Minutes    Chronic Pain Service  [phone]

## 2020-11-19 NOTE — DISCHARGE NOTE PROVIDER - NSDCMRMEDTOKEN_GEN_ALL_CORE_FT
acetaminophen-diphenhydramine: 2 tab(s) orally 3 times a day, As Needed  Rolling Walker:   Synthroid 137 mcg (0.137 mg) oral tablet: 1 tab(s) orally once a day   celecoxib 200 mg oral capsule: 1 cap(s) orally once a day  gabapentin 100 mg oral capsule: 1 cap(s) orally 3 times a day  methocarbamol 500 mg oral tablet: 2 tab(s) orally every 6 hours, As needed, moderate pain  pantoprazole 40 mg oral delayed release tablet: 1 tab(s) orally once a day (before a meal)  Percocet 10 mg-325 mg oral tablet: 1 tab(s) orally 5 times a day, As Needed fro severe pain MDD:5  polyethylene glycol 3350 oral powder for reconstitution: 17 gram(s) orally 2 times a day  Rolling Walker:   senna oral tablet: 2 tab(s) orally once a day (at bedtime)  Synthroid 137 mcg (0.137 mg) oral tablet: 1 tab(s) orally once a day  Zofran 4 mg oral tablet: 1 tab(s) orally every 8 hours, As Needed    celecoxib 200 mg oral capsule: 1 cap(s) orally once a day  gabapentin 100 mg oral capsule: 1 cap(s) orally 3 times a day  methocarbamol 500 mg oral tablet: 2 tab(s) orally every 6 hours, As needed, moderate pain  pantoprazole 40 mg oral delayed release tablet: 1 tab(s) orally once a day (before a meal)  Percocet 10 mg-325 mg oral tablet: 1 tab(s) orally 5 times a day, As Needed for severe pain MDD:5  polyethylene glycol 3350 oral powder for reconstitution: 17 gram(s) orally 2 times a day  Rolling Walker:   senna oral tablet: 2 tab(s) orally once a day (at bedtime)  Synthroid 137 mcg (0.137 mg) oral tablet: 1 tab(s) orally once a day  Zofran 4 mg oral tablet: 1 tab(s) orally every 8 hours, As Needed for nausea

## 2020-11-19 NOTE — PROGRESS NOTE ADULT - SUBJECTIVE AND OBJECTIVE BOX
Chief Complaint:  Patient is a 35y old  Female who presents with a chief complaint of back pain (18 Nov 2020 13:20)    Interval HPI:  35F PMHx L4-L5 spinal fusion x2 years ago (Latefi), papillary thyroid cancer s/p hemithyroidectomy   Admitted with severe low back pain with sciatica    Current out- patient pain regimen: None    Out Patient Pain Management provider: None    Opioid Risk Tool (ORT-OUD) Score: Moderate  father EtOH  Patient uses recreational Marijuana 1-2x/mo    Pt seen ambulating with walker, walks slowly and inverts left foot - rotates and weight bears on lateral foot - because when places foot flat on floor the pain shoots all the way up her leg to buttock. Pt became tearful upon explaining fear of going home and experiencing this severe pain, then having to return to hospital. She lives in a basement apartment with steps and there is no one to help her if she falls or develops the severe pain. Continues to endorse B/L buttock pain but states it is worse and now having cramping/spasms B/L, lateral thighs. Pain Medicine brings level from 10/10 to 8/10 and Robaxin provides some relief from spasm. Discussed interventional pain management consultation on discharge and types of interventions - ex. caudal epidural. Pt has out pt pain practice list and appreciative/agreeable.  Although this is new pain, it is very much like the pain she had prior to the fusion surgery. Denies any other issues.    PHYSICAL EXAM  GENERAL: Mild distress, well-groomed, well-developed  NEURO: Alert & Oriented X3, Good concentration  HEENT: Head normocephalic; PERRLA; speech clear and fluent  GI/: +BM, voiding without issue, appetite fair.  EXTREMITIES: moves all extremities equally against gravity  PSYCH: affect normal; good eye contact; no signs of depression or anxiety    T(C): 36.2 (11-19-20 @ 12:51)  HR: 69 (11-19-20 @ 12:51)  BP: 130/90 (11-19-20 @ 12:51)  RR: 16 (11-19-20 @ 12:51)  SpO2: 100% (11-19-20 @ 12:51)    Current in-patient pain therapy:  MEDICATIONS  (STANDING):  acetaminophen   Tablet .. 975 milliGRAM(s) Oral every 6 hours  celecoxib 200 milliGRAM(s) Oral daily  enoxaparin Injectable 40 milliGRAM(s) SubCutaneous daily  gabapentin 100 milliGRAM(s) Oral three times a day  influenza   Vaccine 0.5 milliLiter(s) IntraMuscular once  levothyroxine 137 MICROGram(s) Oral daily  lidocaine   Patch 1 Patch Transdermal daily  methocarbamol 1000 milliGRAM(s) Oral every 6 hours  pantoprazole    Tablet 40 milliGRAM(s) Oral before breakfast  polyethylene glycol 3350 17 Gram(s) Oral two times a day  senna 2 Tablet(s) Oral at bedtime    MEDICATIONS  (PRN):  ondansetron Injectable 4 milliGRAM(s) IV Push every 6 hours PRN Nausea and/or Vomiting  oxyCODONE    IR 15 milliGRAM(s) Oral every 6 hours PRN Severe Pain (7 - 10)      Allergies    tramadol (Pruritus)  Voltaren (Flushing; Urticaria; Rash; Swelling)  Voltaren (Pruritus; Hives)      Pertinent labs, radiology, additional studies:  Reviewed                          12.0   8.60  )-----------( 205      ( 19 Nov 2020 06:19 )             38.7       11-19    141  |  106  |  13  ----------------------------<  91  4.7   |  26  |  0.67    Ca    8.1<L>      19 Nov 2020 06:19                < from: MR Lumbar Spine w/wo IV Cont (11.17.20 @ 18:17) >  LUMBAR SPINE MRI:    Redemonstration of total L4 laminectomy and posterior fusion of L4 and L5. Susceptibility artifact from spinal hardware limits evaluation of the L4 and L5 vertebral bodies,as well as the spinal canal at those levels.    Remaining lumbar and visualized sacral vertebral bodies demonstrate normal height, alignment, and marrow signal homogeneity. Disc space narrowing at L4-L5 and to a lesser degree at L2-L3. The conus is normal in size, position, and signal characteristics, ending at L1-L2.    No abnormal enhancement in the lumbar region.    T12-L1: No spinal canal stenosis or neural foraminal narrowing.    L1-L2: No spinal canal stenosis or neural foraminal narrowing.    L2-L3: No spinal canal stenosis or neural foraminal narrowing.    L3-L4: Broad-based disc protrusion deforms the ventral thecal sac. No neural foraminal narrowing.    L4-L5: Poorly evaluated due to susceptibility artifact. Central disc protrusion deforms the ventral thecal sac. No gross evidence for neural foraminal narrowing.    L5-S1: No spinal canal stenosis or neural foraminal narrowing.    IMPRESSION:    THORACIC SPINE MRI:  No spinal canal stenosis or neural foraminal narrowing.  No spinal cord compression or abnormal intrinsic cord signal.  No abnormal enhancement in the thoracic region.    LUMBAR SPINE MRI:  Redemonstration of total L4 laminectomy and posterior fusion of L4 and L5. Susceptibility artifact from spinal hardware limits evaluation of the L4 and L5 vertebral bodies, as well as the spinal canal at those levels.    Mild degenerative changes.  No abnormal enhancement in the lumbar region.    < end of copied text >

## 2020-11-19 NOTE — DISCHARGE NOTE NURSING/CASE MANAGEMENT/SOCIAL WORK - PATIENT PORTAL LINK FT
You can access the FollowMyHealth Patient Portal offered by [facility] by registering at the following website: [email or web address]. By joining [employer]’s FollowMyHealth portal, you will also be able to view your health information using other applications (apps) compatible with our system.

## 2020-11-20 VITALS
SYSTOLIC BLOOD PRESSURE: 118 MMHG | OXYGEN SATURATION: 100 % | HEART RATE: 61 BPM | RESPIRATION RATE: 16 BRPM | DIASTOLIC BLOOD PRESSURE: 79 MMHG | TEMPERATURE: 98 F

## 2020-11-20 LAB
SARS-COV-2 IGG SERPL QL IA: POSITIVE
SARS-COV-2 IGM SERPL IA-ACNC: 2.3 INDEX — HIGH

## 2020-11-20 PROCEDURE — U0003: CPT

## 2020-11-20 PROCEDURE — 97161 PT EVAL LOW COMPLEX 20 MIN: CPT

## 2020-11-20 PROCEDURE — 72128 CT CHEST SPINE W/O DYE: CPT

## 2020-11-20 PROCEDURE — 85025 COMPLETE CBC W/AUTO DIFF WBC: CPT

## 2020-11-20 PROCEDURE — 72157 MRI CHEST SPINE W/O & W/DYE: CPT

## 2020-11-20 PROCEDURE — 84702 CHORIONIC GONADOTROPIN TEST: CPT

## 2020-11-20 PROCEDURE — 99239 HOSP IP/OBS DSCHRG MGMT >30: CPT

## 2020-11-20 PROCEDURE — 96376 TX/PRO/DX INJ SAME DRUG ADON: CPT

## 2020-11-20 PROCEDURE — 72131 CT LUMBAR SPINE W/O DYE: CPT

## 2020-11-20 PROCEDURE — 86769 SARS-COV-2 COVID-19 ANTIBODY: CPT

## 2020-11-20 PROCEDURE — 93005 ELECTROCARDIOGRAM TRACING: CPT

## 2020-11-20 PROCEDURE — 97116 GAIT TRAINING THERAPY: CPT

## 2020-11-20 PROCEDURE — A9585: CPT

## 2020-11-20 PROCEDURE — 97530 THERAPEUTIC ACTIVITIES: CPT

## 2020-11-20 PROCEDURE — 80048 BASIC METABOLIC PNL TOTAL CA: CPT

## 2020-11-20 PROCEDURE — 81003 URINALYSIS AUTO W/O SCOPE: CPT

## 2020-11-20 PROCEDURE — 72158 MRI LUMBAR SPINE W/O & W/DYE: CPT

## 2020-11-20 PROCEDURE — 99285 EMERGENCY DEPT VISIT HI MDM: CPT | Mod: 25

## 2020-11-20 PROCEDURE — 86140 C-REACTIVE PROTEIN: CPT

## 2020-11-20 PROCEDURE — 80053 COMPREHEN METABOLIC PANEL: CPT

## 2020-11-20 PROCEDURE — 96375 TX/PRO/DX INJ NEW DRUG ADDON: CPT

## 2020-11-20 PROCEDURE — 85027 COMPLETE CBC AUTOMATED: CPT

## 2020-11-20 PROCEDURE — 96374 THER/PROPH/DIAG INJ IV PUSH: CPT

## 2020-11-20 RX ORDER — ASPIRIN/ACETAMINOPHEN/CAFFEINE 250-250-65
2 TABLET ORAL
Qty: 0 | Refills: 0 | DISCHARGE

## 2020-11-20 RX ORDER — METHOCARBAMOL 500 MG/1
2 TABLET, FILM COATED ORAL
Qty: 120 | Refills: 0
Start: 2020-11-20 | End: 2020-12-04

## 2020-11-20 RX ORDER — ONDANSETRON 8 MG/1
1 TABLET, FILM COATED ORAL
Qty: 45 | Refills: 0
Start: 2020-11-20 | End: 2020-12-04

## 2020-11-20 RX ORDER — SENNA PLUS 8.6 MG/1
2 TABLET ORAL
Qty: 60 | Refills: 0
Start: 2020-11-20 | End: 2020-12-19

## 2020-11-20 RX ORDER — GABAPENTIN 400 MG/1
1 CAPSULE ORAL
Qty: 90 | Refills: 0
Start: 2020-11-20 | End: 2020-12-19

## 2020-11-20 RX ORDER — POLYETHYLENE GLYCOL 3350 17 G/17G
17 POWDER, FOR SOLUTION ORAL
Qty: 170 | Refills: 0
Start: 2020-11-20 | End: 2020-11-24

## 2020-11-20 RX ORDER — CELECOXIB 200 MG/1
1 CAPSULE ORAL
Qty: 30 | Refills: 0
Start: 2020-11-20 | End: 2020-12-19

## 2020-11-20 RX ORDER — OXYCODONE AND ACETAMINOPHEN 5; 325 MG/1; MG/1
1 TABLET ORAL
Qty: 15 | Refills: 0
Start: 2020-11-20 | End: 2020-11-22

## 2020-11-20 RX ORDER — ONDANSETRON 8 MG/1
1 TABLET, FILM COATED ORAL
Qty: 15 | Refills: 0
Start: 2020-11-20 | End: 2020-11-24

## 2020-11-20 RX ORDER — ONDANSETRON 8 MG/1
1 TABLET, FILM COATED ORAL
Qty: 30 | Refills: 0
Start: 2020-11-20 | End: 2020-11-29

## 2020-11-20 RX ORDER — OXYCODONE AND ACETAMINOPHEN 5; 325 MG/1; MG/1
1 TABLET ORAL
Qty: 25 | Refills: 0
Start: 2020-11-20 | End: 2020-11-24

## 2020-11-20 RX ORDER — ONDANSETRON 8 MG/1
1 TABLET, FILM COATED ORAL
Qty: 0 | Refills: 0 | DISCHARGE

## 2020-11-20 RX ORDER — PANTOPRAZOLE SODIUM 20 MG/1
1 TABLET, DELAYED RELEASE ORAL
Qty: 15 | Refills: 0
Start: 2020-11-20 | End: 2020-12-04

## 2020-11-20 RX ADMIN — CELECOXIB 200 MILLIGRAM(S): 200 CAPSULE ORAL at 11:15

## 2020-11-20 RX ADMIN — ENOXAPARIN SODIUM 40 MILLIGRAM(S): 100 INJECTION SUBCUTANEOUS at 11:15

## 2020-11-20 RX ADMIN — ONDANSETRON 4 MILLIGRAM(S): 8 TABLET, FILM COATED ORAL at 05:49

## 2020-11-20 RX ADMIN — Medication 137 MICROGRAM(S): at 05:43

## 2020-11-20 RX ADMIN — OXYCODONE HYDROCHLORIDE 15 MILLIGRAM(S): 5 TABLET ORAL at 11:14

## 2020-11-20 RX ADMIN — Medication 975 MILLIGRAM(S): at 06:40

## 2020-11-20 RX ADMIN — OXYCODONE HYDROCHLORIDE 15 MILLIGRAM(S): 5 TABLET ORAL at 11:45

## 2020-11-20 RX ADMIN — PANTOPRAZOLE SODIUM 40 MILLIGRAM(S): 20 TABLET, DELAYED RELEASE ORAL at 05:49

## 2020-11-20 RX ADMIN — CELECOXIB 200 MILLIGRAM(S): 200 CAPSULE ORAL at 11:45

## 2020-11-20 RX ADMIN — METHOCARBAMOL 1000 MILLIGRAM(S): 500 TABLET, FILM COATED ORAL at 05:43

## 2020-11-20 RX ADMIN — Medication 975 MILLIGRAM(S): at 00:30

## 2020-11-20 RX ADMIN — GABAPENTIN 100 MILLIGRAM(S): 400 CAPSULE ORAL at 05:43

## 2020-11-20 RX ADMIN — OXYCODONE HYDROCHLORIDE 15 MILLIGRAM(S): 5 TABLET ORAL at 05:42

## 2020-11-20 RX ADMIN — METHOCARBAMOL 1000 MILLIGRAM(S): 500 TABLET, FILM COATED ORAL at 11:15

## 2020-11-20 RX ADMIN — LIDOCAINE 1 PATCH: 4 CREAM TOPICAL at 11:26

## 2020-11-20 RX ADMIN — Medication 975 MILLIGRAM(S): at 11:44

## 2020-11-20 RX ADMIN — OXYCODONE HYDROCHLORIDE 15 MILLIGRAM(S): 5 TABLET ORAL at 06:40

## 2020-11-20 RX ADMIN — Medication 975 MILLIGRAM(S): at 11:14

## 2020-11-20 RX ADMIN — Medication 975 MILLIGRAM(S): at 05:42

## 2020-11-20 RX ADMIN — ONDANSETRON 4 MILLIGRAM(S): 8 TABLET, FILM COATED ORAL at 11:14

## 2020-11-20 NOTE — PROGRESS NOTE ADULT - PROBLEM SELECTOR PLAN 5
Diet: regular  Patient is medically stable for d/c home today with walker, to f/u with pain management outpt. Time spent 40 min

## 2020-11-20 NOTE — PROGRESS NOTE ADULT - SUBJECTIVE AND OBJECTIVE BOX
Patient is a 35y old  Female who presents with a chief complaint of back pain (2020 15:33)      SUBJECTIVE / OVERNIGHT EVENTS: Patient seen and examined at bedside. States that she feels well pain is well controlled, denies any CP, SOB, abd pain and n/v. No acute events overnight.     ROS:  All other review of systems negative    Allergies    tramadol (Pruritus)  Voltaren (Flushing; Urticaria; Rash; Swelling)  Voltaren (Pruritus; Hives)    Intolerances        MEDICATIONS  (STANDING):  acetaminophen   Tablet .. 975 milliGRAM(s) Oral every 6 hours  celecoxib 200 milliGRAM(s) Oral daily  enoxaparin Injectable 40 milliGRAM(s) SubCutaneous daily  gabapentin 100 milliGRAM(s) Oral three times a day  influenza   Vaccine 0.5 milliLiter(s) IntraMuscular once  levothyroxine 137 MICROGram(s) Oral daily  lidocaine   Patch 1 Patch Transdermal daily  pantoprazole    Tablet 40 milliGRAM(s) Oral before breakfast  polyethylene glycol 3350 17 Gram(s) Oral two times a day  senna 2 Tablet(s) Oral at bedtime    MEDICATIONS  (PRN):  methocarbamol 1000 milliGRAM(s) Oral every 6 hours PRN moderate pain  ondansetron Injectable 4 milliGRAM(s) IV Push every 6 hours PRN Nausea and/or Vomiting  oxyCODONE    IR 15 milliGRAM(s) Oral every 6 hours PRN Severe Pain (7 - 10)      Vital Signs Last 24 Hrs  T(C): 36.6 (2020 12:05), Max: 37.1 (2020 20:10)  T(F): 97.9 (2020 12:05), Max: 98.8 (2020 20:10)  HR: 61 (2020 12:05) (59 - 69)  BP: 118/79 (2020 12:05) (96/61 - 130/90)  BP(mean): --  RR: 16 (2020 12:05) (16 - 16)  SpO2: 100% (2020 12:05) (98% - 100%)  CAPILLARY BLOOD GLUCOSE        I&O's Summary    2020 07:01  -  2020 07:00  --------------------------------------------------------  IN: 240 mL / OUT: 0 mL / NET: 240 mL        PHYSICAL EXAM:  GENERAL: NAD, well-developed  HEAD:  Atraumatic, Normocephalic  EYES: EOMI, PERRLA, conjunctiva and sclera clear  NECK: Supple, No JVD  CHEST/LUNG: Clear to auscultation bilaterally; No wheeze  HEART: Regular rate and rhythm; No murmurs, rubs, or gallops  ABDOMEN: Soft, Nontender, Nondistended; Bowel sounds present  EXTREMITIES:  2+ Peripheral Pulses, b/l LE trace edema   NEUROLOGY: AAOx3, non-focal  PSYCH: calm  SKIN: No rashes or lesions    LABS:                        12.0   8.60  )-----------( 205      ( 2020 06:19 )             38.7     -    141  |  106  |  13  ----------------------------<  91  4.7   |  26  |  0.67    Ca    8.1<L>      2020 06:19            Urinalysis Basic - ( 2020 05:02 )    Color: Light Yellow / Appearance: Clear / S.017 / pH: x  Gluc: x / Ketone: Negative  / Bili: Negative / Urobili: <2 mg/dL   Blood: x / Protein: Negative / Nitrite: Negative   Leuk Esterase: Negative / RBC: x / WBC x   Sq Epi: x / Non Sq Epi: x / Bacteria: x        RADIOLOGY & ADDITIONAL TESTS:    Consultant(s) Notes Reviewed:  Pain management rec noted     Care Discussed with Consultants/Other Providers: Medicine NP and CM

## 2020-11-20 NOTE — PROGRESS NOTE ADULT - PROBLEM SELECTOR PLAN 1
Patient with hx of L4-L5 transforaminal lumbar interbody fusion on 9/20/2018, now with acute atraumatic low back pain. CT L spine showing post surgical changes, no ftx. No evidence of neuro deficits  Pain management recs:  Percocet 10/325 mg 5 x day PRN (10/325 is highest dose of oxy/tylenol combo)  Continue Celebrex 200 mg PO daily    Continue Robaxin to 1000 mg PO every 6 hours PRN  Continue Gabapentin 100 mg every 8 hours  MR T + L spine noted   no surgical intervention   outpt PT  Pain well controlled, stable for d/c today to f/u with pain management outpt

## 2020-11-20 NOTE — CHART NOTE - NSCHARTNOTEFT_GEN_A_CORE
35F PMHx L4-L5 spinal fusion x2 years ago (Latefi), papillary thyroid cancer s/p hemithyroidectomy   Admitted with severe low back pain with sciatica    Possible Post-Lumbar Laminectomy Syndrome - pt encouraged to see out patient interventional pain specialist for possible caudal MONTSE.  Out-patient pain practice list provided for discharge.    Possible discharge home today  Continue Oxycodone to 15 mg PO every 6 hours PRN   Continue Celebrex 200 mg PO daily    Continue Robaxin to 1000 mg PO every 6 hours PRN  Continue Gabapentin 100 mg every 8 hours     For discharge please order Percocet 10/325 mg 5 x day PRN (10/325 is highest dose of oxy/tylenol combo)      Signing off at this time.      Chronic Pain Service  [phone]

## 2020-11-20 NOTE — PROGRESS NOTE ADULT - PROBLEM SELECTOR PLAN 4
improving, BP stable 110/s 60s   Pt with episodes of hypotension ?in the setting of pain meds s/p 1l ns bolus  pt asymptomatic

## 2020-11-20 NOTE — CHART NOTE - NSCHARTNOTEFT_GEN_A_CORE
Patient reports improvement in pain, ambulating. Request from Dr. Escobar to facilitate patient discharge. Medication reconciliation reviewed, revised, and resolved with Dr. Escobar who had medically cleared patient for discharge with follow-up as advised. Please refer to discharge note for detailed hospital course. Patient is currently stable for discharge to home at this time.    Contact # [phone]

## 2021-01-01 NOTE — PROGRESS NOTE ADULT - SUBJECTIVE AND OBJECTIVE BOX
Day _2__ of Anesthesia Pain Management Service    SUBJECTIVE:    Pain Scale Score	At rest: ___ 	With Activity: ___ 	[x ] Refer to charted pain scores    THERAPY:    [ ] IV PCA Morphine		[ ] 5 mg/mL	[ ] 1 mg/mL  [x ] IV PCA Hydromorphone	[ ] 5 mg/mL	[x ] 1 mg/mL  [ ] IV PCA Fentanyl		[ ] 50 micrograms/mL    Demand dose 0.25    lockout 6   (minutes) Continuous Rate 0      MEDICATIONS  (STANDING):  ascorbic acid 500 milliGRAM(s) Oral two times a day  benzocaine 15 mG/menthol 3.6 mG Lozenge 1 Lozenge Oral every 6 hours  ceFAZolin   IVPB 2000 milliGRAM(s) IV Intermittent once  dextrose 5%. 1000 milliLiter(s) (50 mL/Hr) IV Continuous <Continuous>  dextrose 50% Injectable 12.5 Gram(s) IV Push once  dextrose 50% Injectable 25 Gram(s) IV Push once  dextrose 50% Injectable 25 Gram(s) IV Push once  docusate sodium 100 milliGRAM(s) Oral three times a day  enoxaparin Injectable 40 milliGRAM(s) SubCutaneous at bedtime  famotidine    Tablet 20 milliGRAM(s) Oral two times a day  gabapentin 300 milliGRAM(s) Oral two times a day  HYDROmorphone PCA (1 mG/mL) 30 milliLiter(s) PCA Continuous PCA Continuous  influenza   Vaccine 0.5 milliLiter(s) IntraMuscular once  insulin lispro (HumaLOG) corrective regimen sliding scale   SubCutaneous three times a day before meals  insulin lispro (HumaLOG) corrective regimen sliding scale   SubCutaneous at bedtime  senna 2 Tablet(s) Oral at bedtime  sodium chloride 0.9% with potassium chloride 20 mEq/L 1000 milliLiter(s) (110 mL/Hr) IV Continuous <Continuous>    MEDICATIONS  (PRN):  acetaminophen   Tablet .. 650 milliGRAM(s) Oral every 6 hours PRN Temp greater or equal to 38C (100.4F)  aluminum hydroxide/magnesium hydroxide/simethicone Suspension 30 milliLiter(s) Oral every 12 hours PRN Indigestion  dextrose 40% Gel 15 Gram(s) Oral once PRN Blood Glucose LESS THAN 70 milliGRAM(s)/deciliter  glucagon  Injectable 1 milliGRAM(s) IntraMuscular once PRN Glucose LESS THAN 70 milligrams/deciliter  HYDROmorphone  Injectable 0.5 milliGRAM(s) IV Push every 10 minutes PRN Moderate Pain (4 - 6)  HYDROmorphone  Injectable 0.5 milliGRAM(s) IV Push three times a day PRN Moderate Pain (4 - 6)  HYDROmorphone PCA (1 mG/mL) Rescue Clinician Bolus 0.5 milliGRAM(s) IV Push every 15 minutes PRN for Pain Scale GREATER THAN 6  methocarbamol 500 milliGRAM(s) Oral every 6 hours PRN Back Spasms  ondansetron Injectable 4 milliGRAM(s) IV Push every 6 hours PRN Nausea      OBJECTIVE:    Sedation Score:	[x ] Alert	[ ] Drowsy 	[ ] Arousable	[ ] Asleep	[ ] Unresponsive    Side Effects:	[x ] None	[ ] Nausea	[ ] Vomiting	[ ] Pruritus  		[ ] Other:    Vital Signs Last 24 Hrs  T(C): 36.7 (22 Sep 2018 10:10), Max: 37.2 (22 Sep 2018 01:17)  T(F): 98 (22 Sep 2018 10:10), Max: 99 (22 Sep 2018 01:17)  HR: 80 (22 Sep 2018 10:10) (76 - 80)  BP: 108/70 (22 Sep 2018 10:10) (93/58 - 108/70)  BP(mean): --  RR: 18 (22 Sep 2018 10:10) (17 - 18)  SpO2: 98% (22 Sep 2018 10:10) (97% - 99%)    ASSESSMENT/ PLAN    Therapy to  be:	[x ] Continue   [ ] Discontinued   [ ] Change to prn Analgesics    Documentation and Verification of current medications:   [X] Done	[ ] Not done, not eligible    Comments: (2) well flexed

## 2021-01-27 ENCOUNTER — NON-APPOINTMENT (OUTPATIENT)
Age: 36
End: 2021-01-27

## 2021-02-03 ENCOUNTER — APPOINTMENT (OUTPATIENT)
Dept: SPINE | Facility: CLINIC | Age: 36
End: 2021-02-03
Payer: MEDICAID

## 2021-02-03 PROCEDURE — 99212 OFFICE O/P EST SF 10 MIN: CPT | Mod: 95

## 2021-02-03 NOTE — REASON FOR VISIT
[Follow-Up: _____] : a [unfilled] follow-up visit [FreeTextEntry1] : Pt reports that she get intermittent lowerback pain without neurological deficits.\par She takes gabapentin as needed

## 2021-02-03 NOTE — ASSESSMENT
[FreeTextEntry1] : 35 Year old woman S/P Lumbar Fusion 2018\par \par MRI workup shows intact hardware\par Wean off Gabapentin\par No additional refill needed\par \par \par Follow up as needed

## 2021-03-03 ENCOUNTER — APPOINTMENT (OUTPATIENT)
Dept: SPINE | Facility: CLINIC | Age: 36
End: 2021-03-03

## 2021-04-27 ENCOUNTER — RX RENEWAL (OUTPATIENT)
Age: 36
End: 2021-04-27

## 2021-04-27 RX ORDER — GABAPENTIN 300 MG/1
300 CAPSULE ORAL
Qty: 90 | Refills: 2 | Status: DISCONTINUED | COMMUNITY
Start: 2018-08-14 | End: 2021-04-27

## 2021-07-06 NOTE — ASU PATIENT PROFILE, ADULT - TEACHING/LEARNING FACTORS IMPACT ABILITY TO LEARN
Duplicate refill request for atorvastatin 20 mg. See other refill request for 7/5/2021.     Venus Mares RN  [facility]    
none

## 2021-07-29 ENCOUNTER — EMERGENCY (EMERGENCY)
Facility: HOSPITAL | Age: 36
LOS: 1 days | Discharge: HOME | End: 2021-07-29
Admitting: EMERGENCY MEDICINE
Payer: MEDICAID

## 2021-07-29 VITALS
RESPIRATION RATE: 20 BRPM | DIASTOLIC BLOOD PRESSURE: 88 MMHG | SYSTOLIC BLOOD PRESSURE: 124 MMHG | TEMPERATURE: 99 F | HEIGHT: 65 IN | OXYGEN SATURATION: 100 % | HEART RATE: 65 BPM

## 2021-07-29 DIAGNOSIS — Z98.890 OTHER SPECIFIED POSTPROCEDURAL STATES: Chronic | ICD-10-CM

## 2021-07-29 DIAGNOSIS — E89.0 POSTPROCEDURAL HYPOTHYROIDISM: Chronic | ICD-10-CM

## 2021-07-29 PROCEDURE — 99284 EMERGENCY DEPT VISIT MOD MDM: CPT

## 2021-07-29 RX ORDER — DIAZEPAM 5 MG
5 TABLET ORAL ONCE
Refills: 0 | Status: DISCONTINUED | OUTPATIENT
Start: 2021-07-29 | End: 2021-07-29

## 2021-07-29 NOTE — ED ADULT TRIAGE NOTE - CHIEF COMPLAINT QUOTE
Pt has PMH of L4-L5 herniation and had spinal fusion two years ago . Has severe pain to lower back which is radiating to both legs and buttocks. Took Tylenol 650 mg every four hours with no relief. Had been taking Advil and gabapentin also.

## 2021-07-30 RX ORDER — CYCLOBENZAPRINE HYDROCHLORIDE 10 MG/1
1 TABLET, FILM COATED ORAL
Qty: 9 | Refills: 0
Start: 2021-07-30 | End: 2021-08-01

## 2021-07-30 RX ORDER — DIAZEPAM 5 MG
1 TABLET ORAL
Qty: 9 | Refills: 0
Start: 2021-07-30 | End: 2021-08-01

## 2021-07-30 RX ADMIN — Medication 5 MILLIGRAM(S): at 00:06

## 2021-07-30 NOTE — ED PROVIDER NOTE - PROGRESS NOTE DETAILS
MARITA CAMPOS:  Pt ambulatory without assistance.  Pt medically stable for discharge.  Strict return precautions given.  Pt to follow up with PMD and spine specialist.

## 2021-07-30 NOTE — ED PROVIDER NOTE - NSFOLLOWUPINSTRUCTIONS_ED_ALL_ED_FT
Advance activity as tolerated.  Continue all previously prescribed medications as directed unless otherwise instructed.  Take Tylenol 650mg (Two 325 mg pills) every 4-6 hours as needed for pain or fevers.  Take Motrin (also sold as Advil or Ibuprofen) 400-600 mg (two or three 200 mg over the counter pills) every 8 hours as needed for moderate pain or fevers-- take with food. Take Valium 5 mg every 8 hours as needed for muscle spasm -- causes drowsiness; DO NOT drink alcohol, drive, or operate heavy machinery with this medication.  Follow up with your primary care physician and your spine specialist in 48-72 hours- bring copies of your results.  Return to the ER for worsening or persistent symptoms, including but not limited to worsening/persistent pain, fevers, numbness, weakness, difficulty urinating, difficulty passing bowel movements, incontinence, difficulty walking, falls, and/or ANY NEW OR CONCERNING SYMPTOMS. If you have issues obtaining follow up, please call: [phone] to obtain a doctor or specialist who takes your insurance in your area.  You may call [phone] to make an appointment with the internal medicine clinic.

## 2021-07-30 NOTE — ED PROVIDER NOTE - CLINICAL SUMMARY MEDICAL DECISION MAKING FREE TEXT BOX
Pt is a 37 y/o F PMHx partial thyroidectomy, lumbar discectomy p/w back pain today. -- acute on chronic back pain w/o neurological deficits -- pain control, outpatient follow up

## 2021-07-30 NOTE — ED PROVIDER NOTE - PATIENT PORTAL LINK FT
You can access the FollowMyHealth Patient Portal offered by [facility] by registering at the following website: [email or web address]. By joining [employer] FollowMyHealth portal, you will also be able to view your health information using other applications (apps) compatible with our system.

## 2021-07-30 NOTE — ED PROVIDER NOTE - OBJECTIVE STATEMENT
Pt is a 37 y/o F PMHx partial thyroidectomy, lumbar discectomy p/w back pain today.  Pt reports she developed recurrence of her chronic back pain 4 days ago, described as aching pain radiating down bilateral buttocks, bilateral thighs and down left posterior lower leg associated with minimal numbness at dorsum of left foot.  She reports pain worsens with movement.  She states she has had identical exacerbations over the past 5 years.  Pt denies any fevers, chills, weakness, paresthesias, difficulty voiding, difficulty passing bowel movements, incontinence, trauma, falls, illicit drug use, or any other specific complaints.

## 2021-07-30 NOTE — ED PROVIDER NOTE - PHYSICAL EXAMINATION
Bilateral LE:  5/5 strength; sensation intact to light touch; < 2 sec capillary refill; 2+ pulses    Rectal exam: normal tone; no saddle anesthesia (Chaperone: BERTRAM Myers)

## 2021-08-02 ENCOUNTER — APPOINTMENT (OUTPATIENT)
Dept: SPINE | Facility: CLINIC | Age: 36
End: 2021-08-02
Payer: MEDICAID

## 2021-08-02 DIAGNOSIS — M54.16 RADICULOPATHY, LUMBAR REGION: ICD-10-CM

## 2021-08-02 DIAGNOSIS — M54.9 DORSALGIA, UNSPECIFIED: ICD-10-CM

## 2021-08-02 DIAGNOSIS — M62.830 MUSCLE SPASM OF BACK: ICD-10-CM

## 2021-08-02 DIAGNOSIS — R20.2 PARESTHESIA OF SKIN: ICD-10-CM

## 2021-08-02 DIAGNOSIS — G89.29 DORSALGIA, UNSPECIFIED: ICD-10-CM

## 2021-08-02 PROCEDURE — 99443: CPT

## 2021-08-02 RX ORDER — GABAPENTIN 300 MG/1
300 CAPSULE ORAL
Refills: 0 | Status: ACTIVE | COMMUNITY

## 2021-08-02 RX ORDER — CYCLOBENZAPRINE HYDROCHLORIDE 5 MG/1
5 TABLET, FILM COATED ORAL 3 TIMES DAILY
Qty: 45 | Refills: 0 | Status: DISCONTINUED | COMMUNITY
Start: 2019-06-10 | End: 2021-08-02

## 2021-08-02 RX ORDER — ACETAMINOPHEN 500 MG
500 TABLET ORAL
Refills: 0 | Status: ACTIVE | COMMUNITY

## 2021-08-02 RX ORDER — METHOCARBAMOL 500 MG/1
500 TABLET, FILM COATED ORAL
Qty: 21 | Refills: 0 | Status: ACTIVE | COMMUNITY
Start: 2021-08-02 | End: 1900-01-01

## 2021-08-02 NOTE — ASSESSMENT
[FreeTextEntry1] : 36 Year Old Woman S/P Lumbar Fusion 2018\par Recurrent Lowerback and Leg pain\par Begin PT to strengthen abdominal core, promote ambulation and gait/ posture training\par Begin pain management to manage medication regimen\par Muscle Relaxants renewed\par Medication Purpose, Action, Possible interactions, Side effects as well as adverse effects explain to pt.\par Teachback Protocol implemented\par \par LUMBAR SPINE Xray to assess hardware integrity\par MRI LUMBAR SPINE to r/o Adjacent level disease and assess Lumbar stenosis..\par She will follow up on 8/17/21 with New images to see Dr. Wooten\par

## 2021-08-02 NOTE — HISTORY OF PRESENT ILLNESS
[Home] : at home, [unfilled] , at the time of the visit. [Medical Office: ([facility])___] : at the medical office located in  [Verbal consent obtained from patient] : the patient, [unfilled] [FreeTextEntry3] : Jaya Yoon, NP [FreeTextEntry1] : Ms Lynda Dias is here for comprehensive evaluation of her lumbar spine.\par She is S/P L4-L5 Laminectomy, L4-5 Bilateral facetectomy, L4-L5 radical discectomy, L4-L5 Intervertebral Expandable cage for the purpose of Arthrodesis with L4-L5 Pedicle screws fixation posteriolateral arthrodesis on 9/20/18 for the treatment of Spinal Stenosis with grade 1-2 Spondylolisthesis.\par \par She reports that she was recent seen in ER for recurrent  for severe Lowerback and bilateral leg pain with worse pain in her Right leg. She describes wilfredo pain a Sharp electric sensation radiating into her Right buttock and leg. The pain is worse with ambulation and has not been significantly relieved with Gabapentin and muscle relaxants. She has not had PT or MONTSE injections. She reports ongoing difficulty walking but no weakness in her legs.

## 2021-08-02 NOTE — REVIEW OF SYSTEMS
[Poor Coordination] : poor coordination [Numbness] : numbness [Tingling] : tingling [Arthralgias] : arthralgias [Joint Pain] : joint pain [Negative] : Heme/Lymph

## 2021-08-03 ENCOUNTER — APPOINTMENT (OUTPATIENT)
Dept: RADIOLOGY | Facility: IMAGING CENTER | Age: 36
End: 2021-08-03

## 2021-08-03 ENCOUNTER — RESULT REVIEW (OUTPATIENT)
Age: 36
End: 2021-08-03

## 2021-08-03 ENCOUNTER — OUTPATIENT (OUTPATIENT)
Dept: OUTPATIENT SERVICES | Facility: HOSPITAL | Age: 36
LOS: 1 days | End: 2021-08-03
Payer: MEDICAID

## 2021-08-03 DIAGNOSIS — Z98.890 OTHER SPECIFIED POSTPROCEDURAL STATES: Chronic | ICD-10-CM

## 2021-08-03 DIAGNOSIS — M54.9 DORSALGIA, UNSPECIFIED: ICD-10-CM

## 2021-08-03 DIAGNOSIS — R20.2 PARESTHESIA OF SKIN: ICD-10-CM

## 2021-08-03 DIAGNOSIS — Z98.890 OTHER SPECIFIED POSTPROCEDURAL STATES: ICD-10-CM

## 2021-08-03 DIAGNOSIS — M79.606 PAIN IN LEG, UNSPECIFIED: ICD-10-CM

## 2021-08-03 DIAGNOSIS — E89.0 POSTPROCEDURAL HYPOTHYROIDISM: Chronic | ICD-10-CM

## 2021-08-03 DIAGNOSIS — M48.00 SPINAL STENOSIS, SITE UNSPECIFIED: ICD-10-CM

## 2021-08-03 PROCEDURE — 72100 X-RAY EXAM L-S SPINE 2/3 VWS: CPT

## 2021-08-03 PROCEDURE — 72100 X-RAY EXAM L-S SPINE 2/3 VWS: CPT | Mod: 26

## 2021-08-10 ENCOUNTER — TRANSCRIPTION ENCOUNTER (OUTPATIENT)
Age: 36
End: 2021-08-10

## 2021-08-11 ENCOUNTER — RESULT REVIEW (OUTPATIENT)
Age: 36
End: 2021-08-11

## 2021-08-11 ENCOUNTER — APPOINTMENT (OUTPATIENT)
Dept: MRI IMAGING | Facility: CLINIC | Age: 36
End: 2021-08-11
Payer: MEDICAID

## 2021-08-11 ENCOUNTER — OUTPATIENT (OUTPATIENT)
Dept: OUTPATIENT SERVICES | Facility: HOSPITAL | Age: 36
LOS: 1 days | End: 2021-08-11

## 2021-08-11 DIAGNOSIS — Z98.890 OTHER SPECIFIED POSTPROCEDURAL STATES: Chronic | ICD-10-CM

## 2021-08-11 DIAGNOSIS — E89.0 POSTPROCEDURAL HYPOTHYROIDISM: Chronic | ICD-10-CM

## 2021-08-11 PROCEDURE — 72148 MRI LUMBAR SPINE W/O DYE: CPT | Mod: 26

## 2021-08-17 ENCOUNTER — APPOINTMENT (OUTPATIENT)
Dept: SPINE | Facility: CLINIC | Age: 36
End: 2021-08-17
Payer: MEDICAID

## 2021-08-17 VITALS
HEIGHT: 65 IN | HEART RATE: 82 BPM | BODY MASS INDEX: 34.16 KG/M2 | WEIGHT: 205 LBS | DIASTOLIC BLOOD PRESSURE: 74 MMHG | SYSTOLIC BLOOD PRESSURE: 109 MMHG

## 2021-08-17 DIAGNOSIS — Z98.890 OTHER SPECIFIED POSTPROCEDURAL STATES: ICD-10-CM

## 2021-08-17 DIAGNOSIS — M48.00 SPINAL STENOSIS, SITE UNSPECIFIED: ICD-10-CM

## 2021-08-17 PROCEDURE — 99213 OFFICE O/P EST LOW 20 MIN: CPT

## 2021-08-17 NOTE — PHYSICAL EXAM
[General Appearance - Alert] : alert [General Appearance - In No Acute Distress] : in no acute distress [Oriented To Time, Place, And Person] : oriented to person, place, and time [Cranial Nerves Optic (II)] : visual acuity intact bilaterally,  pupils equal round and reactive to light [Cranial Nerves Oculomotor (III)] : extraocular motion intact [Cranial Nerves Trigeminal (V)] : facial sensation intact symmetrically [Cranial Nerves Facial (VII)] : face symmetrical [Cranial Nerves Vestibulocochlear (VIII)] : hearing was intact bilaterally [Cranial Nerves Glossopharyngeal (IX)] : tongue and palate midline [Cranial Nerves Accessory (XI - Cranial And Spinal)] : head turning and shoulder shrug symmetric [Cranial Nerves Hypoglossal (XII)] : there was no tongue deviation with protrusion [] : no respiratory distress [Heart Rate And Rhythm] : heart rate was normal and rhythm regular [Abnormal Walk] : normal gait

## 2021-08-17 NOTE — END OF VISIT
[FreeTextEntry3] : I, Dr. Cyrus Wooten, evaluated the patient with the nurse practitioner Jaya Yoon and established the plan of care. I personally discuss this patient with the nurse practitioner at the time of the visit. I agree with the assessment and plan as written, unless noted below.\par \par

## 2021-10-19 ENCOUNTER — RX RENEWAL (OUTPATIENT)
Age: 36
End: 2021-10-19

## 2021-11-04 ENCOUNTER — INPATIENT (INPATIENT)
Facility: HOSPITAL | Age: 36
LOS: 5 days | Discharge: HOME | End: 2021-11-10
Attending: HOSPITALIST | Admitting: HOSPITALIST
Payer: MEDICAID

## 2021-11-04 VITALS
DIASTOLIC BLOOD PRESSURE: 76 MMHG | SYSTOLIC BLOOD PRESSURE: 123 MMHG | TEMPERATURE: 97 F | RESPIRATION RATE: 16 BRPM | HEIGHT: 65 IN | HEART RATE: 90 BPM | OXYGEN SATURATION: 100 %

## 2021-11-04 DIAGNOSIS — E89.0 POSTPROCEDURAL HYPOTHYROIDISM: Chronic | ICD-10-CM

## 2021-11-04 DIAGNOSIS — M54.9 DORSALGIA, UNSPECIFIED: ICD-10-CM

## 2021-11-04 DIAGNOSIS — Z98.890 OTHER SPECIFIED POSTPROCEDURAL STATES: Chronic | ICD-10-CM

## 2021-11-04 LAB
ALBUMIN SERPL ELPH-MCNC: 4 G/DL — SIGNIFICANT CHANGE UP (ref 3.3–5)
ALP SERPL-CCNC: 46 U/L — SIGNIFICANT CHANGE UP (ref 40–120)
ALT FLD-CCNC: 15 U/L — SIGNIFICANT CHANGE UP (ref 4–33)
ANION GAP SERPL CALC-SCNC: 11 MMOL/L — SIGNIFICANT CHANGE UP (ref 7–14)
AST SERPL-CCNC: 13 U/L — SIGNIFICANT CHANGE UP (ref 4–32)
BASOPHILS # BLD AUTO: 0.03 K/UL — SIGNIFICANT CHANGE UP (ref 0–0.2)
BASOPHILS NFR BLD AUTO: 0.4 % — SIGNIFICANT CHANGE UP (ref 0–2)
BILIRUB SERPL-MCNC: <0.2 MG/DL — SIGNIFICANT CHANGE UP (ref 0.2–1.2)
BUN SERPL-MCNC: 12 MG/DL — SIGNIFICANT CHANGE UP (ref 7–23)
CALCIUM SERPL-MCNC: 8.5 MG/DL — SIGNIFICANT CHANGE UP (ref 8.4–10.5)
CHLORIDE SERPL-SCNC: 105 MMOL/L — SIGNIFICANT CHANGE UP (ref 98–107)
CO2 SERPL-SCNC: 23 MMOL/L — SIGNIFICANT CHANGE UP (ref 22–31)
CREAT SERPL-MCNC: 0.66 MG/DL — SIGNIFICANT CHANGE UP (ref 0.5–1.3)
EOSINOPHIL # BLD AUTO: 0.09 K/UL — SIGNIFICANT CHANGE UP (ref 0–0.5)
EOSINOPHIL NFR BLD AUTO: 1.3 % — SIGNIFICANT CHANGE UP (ref 0–6)
GLUCOSE SERPL-MCNC: 103 MG/DL — HIGH (ref 70–99)
HCT VFR BLD CALC: 37.4 % — SIGNIFICANT CHANGE UP (ref 34.5–45)
HGB BLD-MCNC: 11.6 G/DL — SIGNIFICANT CHANGE UP (ref 11.5–15.5)
IANC: 4.3 K/UL — SIGNIFICANT CHANGE UP (ref 1.5–8.5)
IMM GRANULOCYTES NFR BLD AUTO: 0.1 % — SIGNIFICANT CHANGE UP (ref 0–1.5)
LYMPHOCYTES # BLD AUTO: 2.24 K/UL — SIGNIFICANT CHANGE UP (ref 1–3.3)
LYMPHOCYTES # BLD AUTO: 32.4 % — SIGNIFICANT CHANGE UP (ref 13–44)
MCHC RBC-ENTMCNC: 27.9 PG — SIGNIFICANT CHANGE UP (ref 27–34)
MCHC RBC-ENTMCNC: 31 GM/DL — LOW (ref 32–36)
MCV RBC AUTO: 89.9 FL — SIGNIFICANT CHANGE UP (ref 80–100)
MONOCYTES # BLD AUTO: 0.25 K/UL — SIGNIFICANT CHANGE UP (ref 0–0.9)
MONOCYTES NFR BLD AUTO: 3.6 % — SIGNIFICANT CHANGE UP (ref 2–14)
NEUTROPHILS # BLD AUTO: 4.3 K/UL — SIGNIFICANT CHANGE UP (ref 1.8–7.4)
NEUTROPHILS NFR BLD AUTO: 62.2 % — SIGNIFICANT CHANGE UP (ref 43–77)
NRBC # BLD: 0 /100 WBCS — SIGNIFICANT CHANGE UP
NRBC # FLD: 0 K/UL — SIGNIFICANT CHANGE UP
PLATELET # BLD AUTO: 259 K/UL — SIGNIFICANT CHANGE UP (ref 150–400)
POTASSIUM SERPL-MCNC: 4 MMOL/L — SIGNIFICANT CHANGE UP (ref 3.5–5.3)
POTASSIUM SERPL-SCNC: 4 MMOL/L — SIGNIFICANT CHANGE UP (ref 3.5–5.3)
PROT SERPL-MCNC: 7.2 G/DL — SIGNIFICANT CHANGE UP (ref 6–8.3)
RBC # BLD: 4.16 M/UL — SIGNIFICANT CHANGE UP (ref 3.8–5.2)
RBC # FLD: 13.2 % — SIGNIFICANT CHANGE UP (ref 10.3–14.5)
SODIUM SERPL-SCNC: 139 MMOL/L — SIGNIFICANT CHANGE UP (ref 135–145)
WBC # BLD: 6.92 K/UL — SIGNIFICANT CHANGE UP (ref 3.8–10.5)
WBC # FLD AUTO: 6.92 K/UL — SIGNIFICANT CHANGE UP (ref 3.8–10.5)

## 2021-11-04 PROCEDURE — 99285 EMERGENCY DEPT VISIT HI MDM: CPT

## 2021-11-04 PROCEDURE — 72100 X-RAY EXAM L-S SPINE 2/3 VWS: CPT | Mod: 26

## 2021-11-04 PROCEDURE — 72131 CT LUMBAR SPINE W/O DYE: CPT | Mod: 26

## 2021-11-04 RX ORDER — HYDROMORPHONE HYDROCHLORIDE 2 MG/ML
0.5 INJECTION INTRAMUSCULAR; INTRAVENOUS; SUBCUTANEOUS ONCE
Refills: 0 | Status: DISCONTINUED | OUTPATIENT
Start: 2021-11-04 | End: 2021-11-05

## 2021-11-04 RX ORDER — MORPHINE SULFATE 50 MG/1
4 CAPSULE, EXTENDED RELEASE ORAL ONCE
Refills: 0 | Status: DISCONTINUED | OUTPATIENT
Start: 2021-11-04 | End: 2021-11-04

## 2021-11-04 RX ORDER — MORPHINE SULFATE 50 MG/1
15 CAPSULE, EXTENDED RELEASE ORAL ONCE
Refills: 0 | Status: DISCONTINUED | OUTPATIENT
Start: 2021-11-04 | End: 2021-11-04

## 2021-11-04 RX ORDER — POLYETHYLENE GLYCOL 3350 17 G/17G
17 POWDER, FOR SOLUTION ORAL DAILY
Refills: 0 | Status: DISCONTINUED | OUTPATIENT
Start: 2021-11-04 | End: 2021-11-10

## 2021-11-04 RX ORDER — METHOCARBAMOL 500 MG/1
500 TABLET, FILM COATED ORAL EVERY 12 HOURS
Refills: 0 | Status: DISCONTINUED | OUTPATIENT
Start: 2021-11-04 | End: 2021-11-10

## 2021-11-04 RX ORDER — LIDOCAINE 4 G/100G
1 CREAM TOPICAL EVERY 24 HOURS
Refills: 0 | Status: DISCONTINUED | OUTPATIENT
Start: 2021-11-04 | End: 2021-11-07

## 2021-11-04 RX ORDER — HYDROMORPHONE HYDROCHLORIDE 2 MG/ML
0.5 INJECTION INTRAMUSCULAR; INTRAVENOUS; SUBCUTANEOUS EVERY 8 HOURS
Refills: 0 | Status: DISCONTINUED | OUTPATIENT
Start: 2021-11-04 | End: 2021-11-09

## 2021-11-04 RX ORDER — GABAPENTIN 400 MG/1
300 CAPSULE ORAL EVERY 8 HOURS
Refills: 0 | Status: DISCONTINUED | OUTPATIENT
Start: 2021-11-04 | End: 2021-11-07

## 2021-11-04 RX ORDER — MORPHINE SULFATE 50 MG/1
15 CAPSULE, EXTENDED RELEASE ORAL EVERY 4 HOURS
Refills: 0 | Status: DISCONTINUED | OUTPATIENT
Start: 2021-11-04 | End: 2021-11-07

## 2021-11-04 RX ORDER — HYDROMORPHONE HYDROCHLORIDE 2 MG/ML
1 INJECTION INTRAMUSCULAR; INTRAVENOUS; SUBCUTANEOUS ONCE
Refills: 0 | Status: DISCONTINUED | OUTPATIENT
Start: 2021-11-04 | End: 2021-11-04

## 2021-11-04 RX ORDER — DIAZEPAM 5 MG
5 TABLET ORAL ONCE
Refills: 0 | Status: DISCONTINUED | OUTPATIENT
Start: 2021-11-04 | End: 2021-11-04

## 2021-11-04 RX ORDER — ACETAMINOPHEN 500 MG
975 TABLET ORAL EVERY 8 HOURS
Refills: 0 | Status: DISCONTINUED | OUTPATIENT
Start: 2021-11-04 | End: 2021-11-10

## 2021-11-04 RX ORDER — ACETAMINOPHEN 500 MG
975 TABLET ORAL ONCE
Refills: 0 | Status: COMPLETED | OUTPATIENT
Start: 2021-11-04 | End: 2021-11-04

## 2021-11-04 RX ORDER — LEVOTHYROXINE SODIUM 125 MCG
125 TABLET ORAL DAILY
Refills: 0 | Status: DISCONTINUED | OUTPATIENT
Start: 2021-11-04 | End: 2021-11-10

## 2021-11-04 RX ORDER — IBUPROFEN 200 MG
600 TABLET ORAL ONCE
Refills: 0 | Status: COMPLETED | OUTPATIENT
Start: 2021-11-04 | End: 2021-11-04

## 2021-11-04 RX ADMIN — MORPHINE SULFATE 4 MILLIGRAM(S): 50 CAPSULE, EXTENDED RELEASE ORAL at 19:25

## 2021-11-04 RX ADMIN — Medication 5 MILLIGRAM(S): at 17:09

## 2021-11-04 RX ADMIN — HYDROMORPHONE HYDROCHLORIDE 1 MILLIGRAM(S): 2 INJECTION INTRAMUSCULAR; INTRAVENOUS; SUBCUTANEOUS at 22:07

## 2021-11-04 RX ADMIN — MORPHINE SULFATE 4 MILLIGRAM(S): 50 CAPSULE, EXTENDED RELEASE ORAL at 21:29

## 2021-11-04 RX ADMIN — Medication 50 MILLIGRAM(S): at 17:24

## 2021-11-04 RX ADMIN — Medication 600 MILLIGRAM(S): at 17:09

## 2021-11-04 RX ADMIN — MORPHINE SULFATE 4 MILLIGRAM(S): 50 CAPSULE, EXTENDED RELEASE ORAL at 21:41

## 2021-11-04 RX ADMIN — MORPHINE SULFATE 15 MILLIGRAM(S): 50 CAPSULE, EXTENDED RELEASE ORAL at 17:24

## 2021-11-04 RX ADMIN — Medication 975 MILLIGRAM(S): at 17:09

## 2021-11-04 NOTE — ED PROVIDER NOTE - PHYSICAL EXAMINATION
Gen - NAD but tearful, apprehensive towards exam; A+Ox3   HEENT - NCAT, EOMI, moist mucous membranes  Neck - supple  Resp - CTAB, no increased WOB  CV -  RRR, no m/r/g  Abd - soft, NT, ND; no guarding or rebound  Back - +midline tenderness to L3-L6  MSK - 5/5 strength and FROM b/l UE and LE; LE strength however limited to pain; +++R straight leg test  Extrem - no LE edema/erythema/tenderness  Neuro - no focal deficits, sensation intact throughout, unable to trial ambulation at this time 2/2 pain

## 2021-11-04 NOTE — ED PROVIDER NOTE - OBJECTIVE STATEMENT
36 year old female with history of thyroid cancer s/p hemithyroidectomy and chronic low back pain s/p L4-L5 transforaminal lumbar interbody fusion in 2018 with Dr. Wooten presenting for acute low back pain. States that this AM she was bending foward to put something in the garbage and "heard a pop" as she stood up, sine then has had significant flare of her low back pain that is usually left sided but now on right side with radiation down leg. Pain worse with movement and having some difficulty walking but took a bus and able to "scramble" into ED. Denies fevers, chills, saddle anesthesia, incontinence, new numbness or weakness. Took tylenol, motrin, gabapentin, robaxin, and placed lidocaine patch without relief. 36 year old female with history of thyroid cancer s/p hemithyroidectomy and chronic low back pain s/p L4-L5 transforaminal lumbar interbody fusion in 2018 with Dr. Wooten presenting for acute low back pain. States that this AM she was bending foward to put something in the garbage and "heard a pop" as she stood up, sine then has had significant flare of her low back pain that is usually left sided but now on right side with radiation down leg. Pain worse with movement and having some difficulty walking but took a bus and able to "scramble" into ED. Denies fevers, chills, saddle anesthesia, incontinence, new numbness or weakness. Took tylenol, motrin, gabapentin, robaxin, and placed lidocaine patch without relief.    PMD: Dr. Mirna Carrington

## 2021-11-04 NOTE — ED PROVIDER NOTE - PROGRESS NOTE DETAILS
Rashad PGY-2: Patient reassessed, continues to be in significant pain despite PO morphine, steroids, tylenol/motrin. Will place IV access, check basic labs, obtain CT L-spine non con, give 4mg IV morphine. Will reassess Rashad PGY-2: Patient reassessed, still in pain, requests further pain control. Additional 4mg IV morphine ordered. Rashad PGY-2: Informed by RN that patient's pain not improved, requests further pain control. Patient reassessed, morphine given >30 min prior, no improvement in pain. Will give 1mg IV dilaudid. Patient is agreeable to admission for pain control. Spoke with Dr. Mendoza Ruiz who admitted patient in 12/2019, per provider patient to be admitted to hospitalist service.

## 2021-11-04 NOTE — ED PROVIDER NOTE - ATTENDING CONTRIBUTION TO CARE
35 yo F thyroid ca s/p partial thyroidectomy, lumbar disc disease with chronic back pain presents for severe lower back pain after standing up from bending over picking up garbage. denies  numbness, weakness, bowel/bladder incontinence. denies fever chills cp, sob, abd pain, n/v. pain with ambulation but able to limitedly ambulate. exam as above. plan: symptom relief prn, reassess.

## 2021-11-04 NOTE — ED ADULT NURSE NOTE - NSIMPLEMENTINTERV_GEN_ALL_ED
Implemented All Fall Risk Interventions:  [place] to call system. Call bell, personal items and telephone within reach. Instruct patient to call for assistance. Room bathroom lighting operational. Non-slip footwear when patient is off stretcher. Physically safe environment: no spills, clutter or unnecessary equipment. Stretcher in lowest position, wheels locked, appropriate side rails in place. Provide visual cue, wrist band, yellow gown, etc. Monitor gait and stability. Monitor for mental status changes and reorient to person, place, and time. Review medications for side effects contributing to fall risk. Reinforce activity limits and safety measures with patient and family.

## 2021-11-04 NOTE — ED ADULT NURSE NOTE - OBJECTIVE STATEMENT
Break RN: Received pt in RW, pt A&Ox4, ambulatory with difficulty due to pain, respirations even and unlabored b/l. Pt c/o back pain after bending. Appears in no apparent distress. IVL 20g Angiocath placed on left AC. Labs sent. Medicated as per MD order. PMHx herniated lumbar disc, R. leg paresthesia. Will continue to monitor.

## 2021-11-04 NOTE — ED PROVIDER NOTE - CLINICAL SUMMARY MEDICAL DECISION MAKING FREE TEXT BOX
36 year old female with history of thyroid cancer s/p hemithyroidectomy and chronic low back pain s/p L4-L5 transforaminal lumbar interbody fusion in 2018 with Dr. Wooten presenting for acute low back pain. 36 year old female with history of thyroid cancer s/p hemithyroidectomy and chronic low back pain s/p L4-L5 transforaminal lumbar interbody fusion in 2018 with Dr. Wooten presenting for acute low back pain. Appears uncomfortable here but nontoxic, VS wnl, neurologically intact on exam despite severe pain to movement. Low suspicion for acute cord compression. Will symptomatically treat and reassess. XR to assess hardware

## 2021-11-05 DIAGNOSIS — R63.8 OTHER SYMPTOMS AND SIGNS CONCERNING FOOD AND FLUID INTAKE: ICD-10-CM

## 2021-11-05 DIAGNOSIS — M54.16 RADICULOPATHY, LUMBAR REGION: ICD-10-CM

## 2021-11-05 DIAGNOSIS — M51.26 OTHER INTERVERTEBRAL DISC DISPLACEMENT, LUMBAR REGION: ICD-10-CM

## 2021-11-05 DIAGNOSIS — K21.9 GASTRO-ESOPHAGEAL REFLUX DISEASE WITHOUT ESOPHAGITIS: ICD-10-CM

## 2021-11-05 DIAGNOSIS — E03.9 HYPOTHYROIDISM, UNSPECIFIED: ICD-10-CM

## 2021-11-05 LAB — SARS-COV-2 RNA SPEC QL NAA+PROBE: SIGNIFICANT CHANGE UP

## 2021-11-05 PROCEDURE — 12345: CPT | Mod: NC,GC

## 2021-11-05 PROCEDURE — 99223 1ST HOSP IP/OBS HIGH 75: CPT

## 2021-11-05 RX ORDER — SENNA PLUS 8.6 MG/1
2 TABLET ORAL AT BEDTIME
Refills: 0 | Status: DISCONTINUED | OUTPATIENT
Start: 2021-11-05 | End: 2021-11-10

## 2021-11-05 RX ORDER — LEVOTHYROXINE SODIUM 125 MCG
1 TABLET ORAL
Qty: 0 | Refills: 0 | DISCHARGE

## 2021-11-05 RX ORDER — INFLUENZA VIRUS VACCINE 15; 15; 15; 15 UG/.5ML; UG/.5ML; UG/.5ML; UG/.5ML
0.5 SUSPENSION INTRAMUSCULAR ONCE
Refills: 0 | Status: DISCONTINUED | OUTPATIENT
Start: 2021-11-05 | End: 2021-11-10

## 2021-11-05 RX ORDER — CHOLECALCIFEROL (VITAMIN D3) 125 MCG
1000 CAPSULE ORAL DAILY
Refills: 0 | Status: DISCONTINUED | OUTPATIENT
Start: 2021-11-05 | End: 2021-11-10

## 2021-11-05 RX ORDER — HEPARIN SODIUM 5000 [USP'U]/ML
5000 INJECTION INTRAVENOUS; SUBCUTANEOUS EVERY 8 HOURS
Refills: 0 | Status: DISCONTINUED | OUTPATIENT
Start: 2021-11-05 | End: 2021-11-10

## 2021-11-05 RX ADMIN — HYDROMORPHONE HYDROCHLORIDE 0.5 MILLIGRAM(S): 2 INJECTION INTRAMUSCULAR; INTRAVENOUS; SUBCUTANEOUS at 17:15

## 2021-11-05 RX ADMIN — GABAPENTIN 300 MILLIGRAM(S): 400 CAPSULE ORAL at 14:09

## 2021-11-05 RX ADMIN — Medication 975 MILLIGRAM(S): at 14:39

## 2021-11-05 RX ADMIN — HEPARIN SODIUM 5000 UNIT(S): 5000 INJECTION INTRAVENOUS; SUBCUTANEOUS at 16:31

## 2021-11-05 RX ADMIN — GABAPENTIN 300 MILLIGRAM(S): 400 CAPSULE ORAL at 21:17

## 2021-11-05 RX ADMIN — Medication 975 MILLIGRAM(S): at 14:09

## 2021-11-05 RX ADMIN — HYDROMORPHONE HYDROCHLORIDE 0.5 MILLIGRAM(S): 2 INJECTION INTRAMUSCULAR; INTRAVENOUS; SUBCUTANEOUS at 01:05

## 2021-11-05 RX ADMIN — MORPHINE SULFATE 15 MILLIGRAM(S): 50 CAPSULE, EXTENDED RELEASE ORAL at 10:59

## 2021-11-05 RX ADMIN — HYDROMORPHONE HYDROCHLORIDE 0.5 MILLIGRAM(S): 2 INJECTION INTRAMUSCULAR; INTRAVENOUS; SUBCUTANEOUS at 17:30

## 2021-11-05 RX ADMIN — METHOCARBAMOL 500 MILLIGRAM(S): 500 TABLET, FILM COATED ORAL at 10:29

## 2021-11-05 RX ADMIN — GABAPENTIN 300 MILLIGRAM(S): 400 CAPSULE ORAL at 07:25

## 2021-11-05 RX ADMIN — MORPHINE SULFATE 15 MILLIGRAM(S): 50 CAPSULE, EXTENDED RELEASE ORAL at 02:52

## 2021-11-05 RX ADMIN — Medication 975 MILLIGRAM(S): at 21:17

## 2021-11-05 RX ADMIN — LIDOCAINE 1 PATCH: 4 CREAM TOPICAL at 13:56

## 2021-11-05 RX ADMIN — Medication 125 MICROGRAM(S): at 07:25

## 2021-11-05 RX ADMIN — HEPARIN SODIUM 5000 UNIT(S): 5000 INJECTION INTRAVENOUS; SUBCUTANEOUS at 21:17

## 2021-11-05 RX ADMIN — Medication 975 MILLIGRAM(S): at 22:17

## 2021-11-05 RX ADMIN — Medication 1000 UNIT(S): at 12:17

## 2021-11-05 RX ADMIN — LIDOCAINE 1 PATCH: 4 CREAM TOPICAL at 00:54

## 2021-11-05 RX ADMIN — POLYETHYLENE GLYCOL 3350 17 GRAM(S): 17 POWDER, FOR SOLUTION ORAL at 12:17

## 2021-11-05 RX ADMIN — HYDROMORPHONE HYDROCHLORIDE 0.5 MILLIGRAM(S): 2 INJECTION INTRAMUSCULAR; INTRAVENOUS; SUBCUTANEOUS at 07:25

## 2021-11-05 RX ADMIN — Medication 975 MILLIGRAM(S): at 07:24

## 2021-11-05 RX ADMIN — MORPHINE SULFATE 15 MILLIGRAM(S): 50 CAPSULE, EXTENDED RELEASE ORAL at 10:29

## 2021-11-05 NOTE — PHYSICAL THERAPY INITIAL EVALUATION ADULT - PERTINENT HX OF CURRENT PROBLEM, REHAB EVAL
Patient is a 37 y/o F hx thryoid cancer s/p hemithyroidectomy, chronic LBP 2/2 herniated disc s/p L4/L5 fusion 2018, who presents with acute low back pain.

## 2021-11-05 NOTE — PHYSICAL THERAPY INITIAL EVALUATION ADULT - DIAGNOSIS, PT EVAL
Pt admitted for L/S pain; MRI  Patient status post fusion at L4/5 with posterior decompression. Degenerative disc disease at L2/3 and L3/4 with disc bulges and mild spinal stenosis at L3/4 which appear relatively stable with the recent MRI; pt presents with antalgic gait

## 2021-11-05 NOTE — H&P ADULT - NSHPPHYSICALEXAM_GEN_ALL_CORE
PHYSICAL EXAM:  GENERAL: NAD, well-developed  HEAD:  Atraumatic, Normocephalic  EYES: EOMI, PERRLA, conjunctiva and sclera clear  NECK: Supple, No JVD  CHEST/LUNG: Clear to auscultation bilaterally; No wheeze  HEART: Regular rate and rhythm; No murmurs, rubs, or gallops  ABDOMEN: Soft, Nontender, Nondistended; Bowel sounds present  EXTREMITIES:  2+ Peripheral Pulses, No clubbing, cyanosis, or edema  PSYCH: AAOx3  NEUROLOGY: 5/5 UE strength, unable to asses LE strength due to pain, but patient was able to ambulate, sensation grossly intact throughout  SKIN: No rashes or lesions

## 2021-11-05 NOTE — ED PEDIATRIC NURSE REASSESSMENT NOTE - NS ED NURSE REASSESS COMMENT FT2
Patient vital signs as noted. patient c/o back pain, given Dilaudid and Lidoderm patch placed. Report given to [facility] 2 RN. Patient in no acute distress, respirations even and unlabored at time of transport to [facility].

## 2021-11-05 NOTE — PROGRESS NOTE ADULT - PROBLEM SELECTOR PLAN 5
Diet: regular    Patient is medically stable for d/c home today with walker, to f/u with pain management outpt. Time spent 40 min Diet: regular    Dispo: Pending PT consult,

## 2021-11-05 NOTE — PHYSICAL THERAPY INITIAL EVALUATION ADULT - ADDITIONAL COMMENTS
Pt reports that she lives in a basement of a private house with her wife with a flight of stairs to negotiate; (+)1 handrail. Prior to hospital admission pt was completely independent and used no assistive device with ambulation. Pt denies any recent falls.    Pt left comfortable in bed, NAD, all lines intact, all precautions maintained, with call bell in reach, and RN aware of PT evaluation and pt's pain. Pt reports that she lives in a basement of a private house with her mother with a flight of stairs to negotiate; (+)1 handrail. Prior to hospital admission pt was completely independent and used no assistive device with ambulation. Pt denies any recent falls.    Pt left comfortable in bed, NAD, all lines intact, all precautions maintained, with call bell in reach, and RN aware of PT evaluation and pt's pain.

## 2021-11-05 NOTE — PROGRESS NOTE ADULT - PROBLEM SELECTOR PLAN 1
Patient with hx of L4-L5 transforaminal lumbar interbody fusion on 9/20/2018, now with acute atraumatic low back pain. CT L spine showing post surgical changes, no ftx. No evidence of neuro deficits  Pain management recs:  Percocet 10/325 mg 5 x day PRN (10/325 is highest dose of oxy/tylenol combo)  Continue Celebrex 200 mg PO daily    Continue Robaxin to 1000 mg PO every 6 hours PRN  Continue Gabapentin 100 mg every 8 hours  MR T + L spine noted   no surgical intervention   outpt PT  Pain well controlled, stable for d/c today to f/u with pain management outpt Patient with hx of L4-L5 transforaminal lumbar interbody fusion on 9/20/2018, now with acute atraumatic low back pain. CT L spine showing post surgical changes, no ftx- however limited  Pain management recs:  Percocet 10/325 mg 5 x day PRN (10/325 is highest dose of oxy/tylenol combo)  Continue Celebrex 200 mg PO daily    Continue Robaxin to 1000 mg PO every 6 hours PRN  Continue Gabapentin 100 mg every 8 hours  MR T + L spine noted   Repeat MRI??  no surgical intervention   outpt PT  If pain well controlled, stable for d/c today to f/u with pain management outpt

## 2021-11-05 NOTE — H&P ADULT - PROBLEM SELECTOR PLAN 1
-radicular LBP usually paresthesia on left posterior thigh now on right as well  -no urinary incontinence, dysuria, or decreased sensation in groin, able to ambulate less concern for emergent findings  -pending CT lumbar, can consider spine evaluation vs conservative management if there is herniated disc  -PT eval  -methocarbamol 500 mg BID prn muscle spasm  -c/w home gabapentin 300 mg TID  -tylenol 975 mg TID standing  -morphine IR 15 mg Q4 moderate pain, dilaudid 0.5 mg iv Q8 severe pain  -pain consult

## 2021-11-05 NOTE — PROGRESS NOTE ADULT - PROBLEM SELECTOR PLAN 4
improving, BP stable 110/s 60s   Pt with episodes of hypotension ?in the setting of pain meds s/p 1l ns bolus  pt asymptomatic improving, SBP 120s-130s   Pt with episodes of hypotension ?in the setting of pain meds s/p 1l ns bolus  pt asymptomatic

## 2021-11-05 NOTE — H&P ADULT - HISTORY OF PRESENT ILLNESS
Patient is a 35 y/o F hx thryoid cancer s/p hemithyroidectomy, chronic LBP 2/2 herniated disc s/p L4/L5 fusion 2018, who presents with acute low back pain. She was bending forward today and felt a snap and had acute low back pain after hearing a pop. She generally has radiculopathy towards the left posterior thigh, but now it is towards the right posterior thigh as well. She states this is worse than usual, and had difficulty with ambulation, though with assistance or a walker was able to get around. She was able to take the bus to the ED. She follows outpt pain specialist due to chronic LBP, and receives epidural injections for it. Takes ibuprofen, advil-acetaminophen, gabapentin, and methocarbamol. She denies dysuria, urinary incontinence, or decreased sensation around groin or anal area. She denies fevers, chills, nausea, vomiting, diarrhea, sob, cp, visual changes, headaches, or skin rashes.     ED vitals: afebrile, HR 74, /67, RR 18 99% RA  xray lumbar without acute findings  CT lumbar performed

## 2021-11-05 NOTE — PROGRESS NOTE ADULT - SUBJECTIVE AND OBJECTIVE BOX
DATE OF SERVICE: 11-05-21 @ 06:26    Patient is a 36y old  Female who presents with a chief complaint of Low back pain (05 Nov 2021 00:02)      SUBJECTIVE / OVERNIGHT EVENTS:    MEDICATIONS  (STANDING):  acetaminophen     Tablet .. 975 milliGRAM(s) Oral every 8 hours  gabapentin 300 milliGRAM(s) Oral every 8 hours  levothyroxine 125 MICROGram(s) Oral daily  lidocaine   4% Patch 1 Patch Transdermal every 24 hours  polyethylene glycol 3350 17 Gram(s) Oral daily    MEDICATIONS  (PRN):  HYDROmorphone  Injectable 0.5 milliGRAM(s) IV Push every 8 hours PRN Severe Pain (7 - 10)  methocarbamol 500 milliGRAM(s) Oral every 12 hours PRN Muscle Spasm  morphine  IR 15 milliGRAM(s) Oral every 4 hours PRN Moderate Pain (4 - 6)      Vital Signs Last 24 Hrs  T(C): 36.9 (05 Nov 2021 01:34), Max: 36.9 (05 Nov 2021 01:34)  T(F): 98.5 (05 Nov 2021 01:34), Max: 98.5 (05 Nov 2021 01:34)  HR: 65 (05 Nov 2021 02:43) (57 - 90)  BP: 110/69 (05 Nov 2021 02:43) (93/51 - 123/76)  BP(mean): --  RR: 18 (05 Nov 2021 01:34) (16 - 18)  SpO2: 100% (05 Nov 2021 01:34) (99% - 100%)  CAPILLARY BLOOD GLUCOSE        I&O's Summary      PHYSICAL EXAM:  GENERAL: NAD, well-developed  HEAD:  Atraumatic, Normocephalic  EYES: EOMI, PERRLA, conjunctiva and sclera clear  NECK: Supple, No JVD  CHEST/LUNG: Clear to auscultation bilaterally; No wheeze  HEART: Regular rate and rhythm; No murmurs, rubs, or gallops  ABDOMEN: Soft, Nontender, Nondistended; Bowel sounds present  EXTREMITIES:  2+ Peripheral Pulses, No clubbing, cyanosis, or edema  PSYCH: AAOx3  NEUROLOGY: non-focal  SKIN: No rashes or lesions    LABS:                        11.6   6.92  )-----------( 259      ( 04 Nov 2021 19:32 )             37.4     11-04    139  |  105  |  12  ----------------------------<  103<H>  4.0   |  23  |  0.66    Ca    8.5      04 Nov 2021 19:32    TPro  7.2  /  Alb  4.0  /  TBili  <0.2  /  DBili  x   /  AST  13  /  ALT  15  /  AlkPhos  46  11-04              RADIOLOGY & ADDITIONAL TESTS:    Imaging Personally Reviewed:    Consultant(s) Notes Reviewed:      Care Discussed with Consultants/Other Providers:   DATE OF SERVICE: 11-05-21 @ 06:26    Patient is a 36y old  Female who presents with a chief complaint of Low back pain (05 Nov 2021 00:02)      SUBJECTIVE / OVERNIGHT EVENTS: Patient had a hard time sleeping last night. Required 3 dilaudid PRNs overnight. Says pain is improving overall. Has not ambulated yet. Denies bowel/bladder incontinence, saddle anesthesia, CP, fevers/chills, N/V/D    MEDICATIONS  (STANDING):  acetaminophen     Tablet .. 975 milliGRAM(s) Oral every 8 hours  gabapentin 300 milliGRAM(s) Oral every 8 hours  levothyroxine 125 MICROGram(s) Oral daily  lidocaine   4% Patch 1 Patch Transdermal every 24 hours  polyethylene glycol 3350 17 Gram(s) Oral daily    MEDICATIONS  (PRN):  HYDROmorphone  Injectable 0.5 milliGRAM(s) IV Push every 8 hours PRN Severe Pain (7 - 10)  methocarbamol 500 milliGRAM(s) Oral every 12 hours PRN Muscle Spasm  morphine  IR 15 milliGRAM(s) Oral every 4 hours PRN Moderate Pain (4 - 6)      Vital Signs Last 24 Hrs  T(C): 36.9 (05 Nov 2021 01:34), Max: 36.9 (05 Nov 2021 01:34)  T(F): 98.5 (05 Nov 2021 01:34), Max: 98.5 (05 Nov 2021 01:34)  HR: 65 (05 Nov 2021 02:43) (57 - 90)  BP: 110/69 (05 Nov 2021 02:43) (93/51 - 123/76)  BP(mean): --  RR: 18 (05 Nov 2021 01:34) (16 - 18)  SpO2: 100% (05 Nov 2021 01:34) (99% - 100%)  CAPILLARY BLOOD GLUCOSE        I&O's Summary      PHYSICAL EXAM:  GENERAL: NAD, well-developed  HEAD:  Atraumatic, Normocephalic  EYES: EOMI, PERRLA, conjunctiva and sclera clear  NECK: Supple, No JVD  CHEST/LUNG: Clear to auscultation bilaterally; No wheeze  HEART: Regular rate and rhythm; No murmurs, rubs, or gallops  ABDOMEN: Soft, Nontender, Nondistended; Bowel sounds present  EXTREMITIES:  2+ Peripheral Pulses, No clubbing, cyanosis, or edema  PSYCH: AAOx3  NEUROLOGY: 5/5 UE strength, unable to asses LE strength due to pain, sensation intact to light touch and temperature throughout  SKIN: No rashes or lesions  LABS:                        11.6   6.92  )-----------( 259      ( 04 Nov 2021 19:32 )             37.4     11-04    139  |  105  |  12  ----------------------------<  103<H>  4.0   |  23  |  0.66    Ca    8.5      04 Nov 2021 19:32    TPro  7.2  /  Alb  4.0  /  TBili  <0.2  /  DBili  x   /  AST  13  /  ALT  15  /  AlkPhos  46  11-04              RADIOLOGY & ADDITIONAL TESTS:    Imaging Personally Reviewed:    Consultant(s) Notes Reviewed:      Care Discussed with Consultants/Other Providers:   DATE OF SERVICE: 11-05-21 @ 06:26    Patient is a 36y old  Female who presents with a chief complaint of Low back pain (05 Nov 2021 00:02)      SUBJECTIVE / OVERNIGHT EVENTS: Patient had a hard time sleeping last night. Required 3 dilaudid PRNs overnight. Says pain is improving overall. Has not ambulated yet. Denies bowel/bladder incontinence, saddle anesthesia, CP, fevers/chills, N/V/D    MEDICATIONS  (STANDING):  acetaminophen     Tablet .. 975 milliGRAM(s) Oral every 8 hours  gabapentin 300 milliGRAM(s) Oral every 8 hours  levothyroxine 125 MICROGram(s) Oral daily  lidocaine   4% Patch 1 Patch Transdermal every 24 hours  polyethylene glycol 3350 17 Gram(s) Oral daily    MEDICATIONS  (PRN):  HYDROmorphone  Injectable 0.5 milliGRAM(s) IV Push every 8 hours PRN Severe Pain (7 - 10)  methocarbamol 500 milliGRAM(s) Oral every 12 hours PRN Muscle Spasm  morphine  IR 15 milliGRAM(s) Oral every 4 hours PRN Moderate Pain (4 - 6)      Vital Signs Last 24 Hrs  T(C): 36.9 (05 Nov 2021 01:34), Max: 36.9 (05 Nov 2021 01:34)  T(F): 98.5 (05 Nov 2021 01:34), Max: 98.5 (05 Nov 2021 01:34)  HR: 65 (05 Nov 2021 02:43) (57 - 90)  BP: 110/69 (05 Nov 2021 02:43) (93/51 - 123/76)  BP(mean): --  RR: 18 (05 Nov 2021 01:34) (16 - 18)  SpO2: 100% (05 Nov 2021 01:34) (99% - 100%)  CAPILLARY BLOOD GLUCOSE        I&O's Summary      PHYSICAL EXAM:  GENERAL: NAD, well-developed  HEAD:  Atraumatic, Normocephalic  EYES: EOMI, PERRLA, conjunctiva and sclera clear  NECK: Supple, No JVD  CHEST/LUNG: Clear to auscultation bilaterally; No wheeze  HEART: Regular rate and rhythm; No murmurs, rubs, or gallops  ABDOMEN: Soft, Nontender, Nondistended; Bowel sounds present  EXTREMITIES:  2+ Peripheral Pulses, No clubbing, cyanosis, or edema  PSYCH: AAOx3  NEUROLOGY: 5/5 UE strength, unable to asses LE strength due to pain, sensation intact to light touch and temperature throughout  SKIN: No rashes or lesions  LABS:                        11.6   6.92  )-----------( 259      ( 04 Nov 2021 19:32 )             37.4     11-04    139  |  105  |  12  ----------------------------<  103<H>  4.0   |  23  |  0.66    Ca    8.5      04 Nov 2021 19:32    TPro  7.2  /  Alb  4.0  /  TBili  <0.2  /  DBili  x   /  AST  13  /  ALT  15  /  AlkPhos  46  11-04              RADIOLOGY & ADDITIONAL TESTS:    Imaging Personally Reviewed:< from: CT Lumbar Spine No Cont (11.04.21 @ 22:45) >  Redemonstration of L4-L5 posterior fusion, laminectomy, and interbody fusion graft placement. The orthopedic hardware appears intact and the position of the hardware is unchanged when compared to the prior studies.    No acute fractures or dislocations.    Intrinsically limited examination of the intraspinal contents.    < end of copied text >      Consultant(s) Notes Reviewed:      Care Discussed with Consultants/Other Providers:

## 2021-11-05 NOTE — H&P ADULT - NSHPLABSRESULTS_GEN_ALL_CORE
LABS:                         11.6   6.92  )-----------( 259      ( 04 Nov 2021 19:32 )             37.4     11-04    139  |  105  |  12  ----------------------------<  103<H>  4.0   |  23  |  0.66    Ca    8.5      04 Nov 2021 19:32    TPro  7.2  /  Alb  4.0  /  TBili  <0.2  /  DBili  x   /  AST  13  /  ALT  15  /  AlkPhos  46  11-04              RADIOLOGY, EKG & ADDITIONAL TESTS: Reviewed.

## 2021-11-05 NOTE — PHYSICAL THERAPY INITIAL EVALUATION ADULT - PATIENT PROFILE REVIEW, REHAB EVAL
No Formal Activity Order in the Computer; spoke with JASON Puentes Prior to PT evaluation--> Pt OK for PT consult/OOB activity./yes

## 2021-11-06 LAB
ANION GAP SERPL CALC-SCNC: 9 MMOL/L — SIGNIFICANT CHANGE UP (ref 7–14)
BUN SERPL-MCNC: 11 MG/DL — SIGNIFICANT CHANGE UP (ref 7–23)
CALCIUM SERPL-MCNC: 8 MG/DL — LOW (ref 8.4–10.5)
CHLORIDE SERPL-SCNC: 105 MMOL/L — SIGNIFICANT CHANGE UP (ref 98–107)
CO2 SERPL-SCNC: 25 MMOL/L — SIGNIFICANT CHANGE UP (ref 22–31)
COVID-19 NUCLEOCAPSID GAM AB INTERP: POSITIVE
COVID-19 NUCLEOCAPSID TOTAL GAM ANTIBODY RESULT: 43 INDEX — HIGH
COVID-19 SPIKE DOMAIN AB INTERP: POSITIVE
COVID-19 SPIKE DOMAIN ANTIBODY RESULT: >250 U/ML — HIGH
CREAT SERPL-MCNC: 0.73 MG/DL — SIGNIFICANT CHANGE UP (ref 0.5–1.3)
GLUCOSE SERPL-MCNC: 88 MG/DL — SIGNIFICANT CHANGE UP (ref 70–99)
HCT VFR BLD CALC: 34.2 % — LOW (ref 34.5–45)
HGB BLD-MCNC: 10.8 G/DL — LOW (ref 11.5–15.5)
MAGNESIUM SERPL-MCNC: 2.1 MG/DL — SIGNIFICANT CHANGE UP (ref 1.6–2.6)
MCHC RBC-ENTMCNC: 28 PG — SIGNIFICANT CHANGE UP (ref 27–34)
MCHC RBC-ENTMCNC: 31.6 GM/DL — LOW (ref 32–36)
MCV RBC AUTO: 88.6 FL — SIGNIFICANT CHANGE UP (ref 80–100)
NRBC # BLD: 0 /100 WBCS — SIGNIFICANT CHANGE UP
NRBC # FLD: 0 K/UL — SIGNIFICANT CHANGE UP
PHOSPHATE SERPL-MCNC: 2.9 MG/DL — SIGNIFICANT CHANGE UP (ref 2.5–4.5)
PLATELET # BLD AUTO: 237 K/UL — SIGNIFICANT CHANGE UP (ref 150–400)
POTASSIUM SERPL-MCNC: 4.5 MMOL/L — SIGNIFICANT CHANGE UP (ref 3.5–5.3)
POTASSIUM SERPL-SCNC: 4.5 MMOL/L — SIGNIFICANT CHANGE UP (ref 3.5–5.3)
RBC # BLD: 3.86 M/UL — SIGNIFICANT CHANGE UP (ref 3.8–5.2)
RBC # FLD: 13.3 % — SIGNIFICANT CHANGE UP (ref 10.3–14.5)
SARS-COV-2 IGG+IGM SERPL QL IA: 43 INDEX — HIGH
SARS-COV-2 IGG+IGM SERPL QL IA: >250 U/ML — HIGH
SARS-COV-2 IGG+IGM SERPL QL IA: POSITIVE
SARS-COV-2 IGG+IGM SERPL QL IA: POSITIVE
SODIUM SERPL-SCNC: 139 MMOL/L — SIGNIFICANT CHANGE UP (ref 135–145)
WBC # BLD: 7.73 K/UL — SIGNIFICANT CHANGE UP (ref 3.8–10.5)
WBC # FLD AUTO: 7.73 K/UL — SIGNIFICANT CHANGE UP (ref 3.8–10.5)

## 2021-11-06 PROCEDURE — 99233 SBSQ HOSP IP/OBS HIGH 50: CPT | Mod: GC

## 2021-11-06 RX ADMIN — GABAPENTIN 300 MILLIGRAM(S): 400 CAPSULE ORAL at 14:43

## 2021-11-06 RX ADMIN — SENNA PLUS 2 TABLET(S): 8.6 TABLET ORAL at 21:53

## 2021-11-06 RX ADMIN — GABAPENTIN 300 MILLIGRAM(S): 400 CAPSULE ORAL at 21:53

## 2021-11-06 RX ADMIN — Medication 975 MILLIGRAM(S): at 14:45

## 2021-11-06 RX ADMIN — MORPHINE SULFATE 15 MILLIGRAM(S): 50 CAPSULE, EXTENDED RELEASE ORAL at 11:30

## 2021-11-06 RX ADMIN — HEPARIN SODIUM 5000 UNIT(S): 5000 INJECTION INTRAVENOUS; SUBCUTANEOUS at 21:52

## 2021-11-06 RX ADMIN — Medication 125 MICROGRAM(S): at 06:17

## 2021-11-06 RX ADMIN — POLYETHYLENE GLYCOL 3350 17 GRAM(S): 17 POWDER, FOR SOLUTION ORAL at 11:50

## 2021-11-06 RX ADMIN — Medication 975 MILLIGRAM(S): at 13:59

## 2021-11-06 RX ADMIN — Medication 40 MILLIGRAM(S): at 12:35

## 2021-11-06 RX ADMIN — GABAPENTIN 300 MILLIGRAM(S): 400 CAPSULE ORAL at 06:17

## 2021-11-06 RX ADMIN — LIDOCAINE 1 PATCH: 4 CREAM TOPICAL at 00:15

## 2021-11-06 RX ADMIN — Medication 975 MILLIGRAM(S): at 07:18

## 2021-11-06 RX ADMIN — Medication 975 MILLIGRAM(S): at 21:53

## 2021-11-06 RX ADMIN — METHOCARBAMOL 500 MILLIGRAM(S): 500 TABLET, FILM COATED ORAL at 11:50

## 2021-11-06 RX ADMIN — HEPARIN SODIUM 5000 UNIT(S): 5000 INJECTION INTRAVENOUS; SUBCUTANEOUS at 14:43

## 2021-11-06 RX ADMIN — LIDOCAINE 1 PATCH: 4 CREAM TOPICAL at 12:43

## 2021-11-06 RX ADMIN — MORPHINE SULFATE 15 MILLIGRAM(S): 50 CAPSULE, EXTENDED RELEASE ORAL at 00:22

## 2021-11-06 RX ADMIN — HYDROMORPHONE HYDROCHLORIDE 0.5 MILLIGRAM(S): 2 INJECTION INTRAMUSCULAR; INTRAVENOUS; SUBCUTANEOUS at 18:13

## 2021-11-06 RX ADMIN — HEPARIN SODIUM 5000 UNIT(S): 5000 INJECTION INTRAVENOUS; SUBCUTANEOUS at 06:18

## 2021-11-06 RX ADMIN — Medication 1000 UNIT(S): at 11:51

## 2021-11-06 RX ADMIN — Medication 975 MILLIGRAM(S): at 06:18

## 2021-11-06 RX ADMIN — MORPHINE SULFATE 15 MILLIGRAM(S): 50 CAPSULE, EXTENDED RELEASE ORAL at 21:53

## 2021-11-06 RX ADMIN — MORPHINE SULFATE 15 MILLIGRAM(S): 50 CAPSULE, EXTENDED RELEASE ORAL at 10:30

## 2021-11-06 NOTE — PROGRESS NOTE ADULT - PROBLEM SELECTOR PLAN 4
improving, SBP 120s-130s   Pt with episodes of hypotension ?in the setting of pain meds s/p 1l ns bolus  pt asymptomatic

## 2021-11-06 NOTE — PROGRESS NOTE ADULT - SUBJECTIVE AND OBJECTIVE BOX
DATE OF SERVICE: 11-06-21 @ 09:44    Patient is a 36y old  Female who presents with a chief complaint of Low back pain (05 Nov 2021 06:26)      SUBJECTIVE / OVERNIGHT EVENTS: Patient's pain improving but still sharp right sided 6-8/10 intermittently. Required 1 dilaudid PRN overnight. Denies CP, SOB, fevers/chills, saddle anesthesia/weakness    MEDICATIONS  (STANDING):  acetaminophen     Tablet .. 975 milliGRAM(s) Oral every 8 hours  cholecalciferol 1000 Unit(s) Oral daily  gabapentin 300 milliGRAM(s) Oral every 8 hours  heparin   Injectable 5000 Unit(s) SubCutaneous every 8 hours  influenza   Vaccine 0.5 milliLiter(s) IntraMuscular once  levothyroxine 125 MICROGram(s) Oral daily  lidocaine   4% Patch 1 Patch Transdermal every 24 hours  polyethylene glycol 3350 17 Gram(s) Oral daily  senna 2 Tablet(s) Oral at bedtime    MEDICATIONS  (PRN):  HYDROmorphone  Injectable 0.5 milliGRAM(s) IV Push every 8 hours PRN Severe Pain (7 - 10)  methocarbamol 500 milliGRAM(s) Oral every 12 hours PRN Muscle Spasm  morphine  IR 15 milliGRAM(s) Oral every 4 hours PRN Moderate Pain (4 - 6)      Vital Signs Last 24 Hrs  T(C): 36.4 (06 Nov 2021 06:32), Max: 37 (05 Nov 2021 15:44)  T(F): 97.6 (06 Nov 2021 06:32), Max: 98.6 (05 Nov 2021 15:44)  HR: 78 (06 Nov 2021 06:32) (72 - 80)  BP: 110/68 (06 Nov 2021 06:32) (100/65 - 110/68)  BP(mean): --  RR: 18 (06 Nov 2021 06:32) (16 - 18)  SpO2: 98% (06 Nov 2021 06:32) (98% - 100%)  CAPILLARY BLOOD GLUCOSE        I&O's Summary    05 Nov 2021 07:01  -  06 Nov 2021 07:00  --------------------------------------------------------  IN: 1250 mL / OUT: 0 mL / NET: 1250 mL        PHYSICAL EXAM:  GENERAL: NAD, well-developed  HEAD:  Atraumatic, Normocephalic  EYES: EOMI, PERRLA, conjunctiva and sclera clear  NECK: Supple, No JVD  CHEST/LUNG: Clear to auscultation bilaterally; No wheeze  HEART: Regular rate and rhythm; No murmurs, rubs, or gallops  ABDOMEN: Soft, Nontender, Nondistended; Bowel sounds present  EXTREMITIES:  2+ Peripheral Pulses, No clubbing, cyanosis, or edema  PSYCH: AAOx3  NEUROLOGY: non-focal, 5/5 UE strength, unable to asses LE strength due to pain, sensation intact to light touch and temperature throughout  SKIN: No rashes or lesions    LABS:                        10.8   7.73  )-----------( 237      ( 06 Nov 2021 07:41 )             34.2     11-06    139  |  105  |  11  ----------------------------<  88  4.5   |  25  |  0.73    Ca    8.0<L>      06 Nov 2021 07:41  Phos  2.9     11-06  Mg     2.10     11-06    TPro  7.2  /  Alb  4.0  /  TBili  <0.2  /  DBili  x   /  AST  13  /  ALT  15  /  AlkPhos  46  11-04              RADIOLOGY & ADDITIONAL TESTS:    Imaging Personally Reviewed:    Consultant(s) Notes Reviewed:      Care Discussed with Consultants/Other Providers:

## 2021-11-06 NOTE — PROGRESS NOTE ADULT - PROBLEM SELECTOR PLAN 1
Patient with hx of L4-L5 transforaminal lumbar interbody fusion on 9/20/2018, now with acute atraumatic low back pain. CT L spine showing post surgical changes, no ftx- however limited  Tylenol for mild pain  Dilaudid 0.5mg q8prn for severe pain  Continue Robaxin to 1000 mg PO every 6 hours PRN  Continue Gabapentin 100 mg every 8 hours  MR T + L spine noted   F/u pain management recs  Repeat MRI??  no surgical intervention   PT recommends outpt PT

## 2021-11-07 PROCEDURE — 99233 SBSQ HOSP IP/OBS HIGH 50: CPT | Mod: GC

## 2021-11-07 RX ORDER — LIDOCAINE 4 G/100G
1 CREAM TOPICAL EVERY 24 HOURS
Refills: 0 | Status: DISCONTINUED | OUTPATIENT
Start: 2021-11-07 | End: 2021-11-10

## 2021-11-07 RX ORDER — OXYCODONE HYDROCHLORIDE 5 MG/1
5 TABLET ORAL EVERY 4 HOURS
Refills: 0 | Status: DISCONTINUED | OUTPATIENT
Start: 2021-11-07 | End: 2021-11-09

## 2021-11-07 RX ORDER — OXYCODONE HYDROCHLORIDE 5 MG/1
10 TABLET ORAL EVERY 4 HOURS
Refills: 0 | Status: DISCONTINUED | OUTPATIENT
Start: 2021-11-07 | End: 2021-11-09

## 2021-11-07 RX ORDER — IBUPROFEN 200 MG
400 TABLET ORAL EVERY 8 HOURS
Refills: 0 | Status: DISCONTINUED | OUTPATIENT
Start: 2021-11-07 | End: 2021-11-09

## 2021-11-07 RX ORDER — PANTOPRAZOLE SODIUM 20 MG/1
40 TABLET, DELAYED RELEASE ORAL
Refills: 0 | Status: DISCONTINUED | OUTPATIENT
Start: 2021-11-07 | End: 2021-11-10

## 2021-11-07 RX ORDER — GABAPENTIN 400 MG/1
400 CAPSULE ORAL EVERY 8 HOURS
Refills: 0 | Status: DISCONTINUED | OUTPATIENT
Start: 2021-11-07 | End: 2021-11-10

## 2021-11-07 RX ADMIN — LIDOCAINE 1 PATCH: 4 CREAM TOPICAL at 00:50

## 2021-11-07 RX ADMIN — LIDOCAINE 1 PATCH: 4 CREAM TOPICAL at 14:00

## 2021-11-07 RX ADMIN — POLYETHYLENE GLYCOL 3350 17 GRAM(S): 17 POWDER, FOR SOLUTION ORAL at 13:11

## 2021-11-07 RX ADMIN — HEPARIN SODIUM 5000 UNIT(S): 5000 INJECTION INTRAVENOUS; SUBCUTANEOUS at 21:59

## 2021-11-07 RX ADMIN — Medication 400 MILLIGRAM(S): at 18:14

## 2021-11-07 RX ADMIN — LIDOCAINE 1 PATCH: 4 CREAM TOPICAL at 12:16

## 2021-11-07 RX ADMIN — Medication 975 MILLIGRAM(S): at 00:22

## 2021-11-07 RX ADMIN — Medication 975 MILLIGRAM(S): at 21:58

## 2021-11-07 RX ADMIN — Medication 975 MILLIGRAM(S): at 05:47

## 2021-11-07 RX ADMIN — HEPARIN SODIUM 5000 UNIT(S): 5000 INJECTION INTRAVENOUS; SUBCUTANEOUS at 14:01

## 2021-11-07 RX ADMIN — Medication 975 MILLIGRAM(S): at 06:27

## 2021-11-07 RX ADMIN — OXYCODONE HYDROCHLORIDE 10 MILLIGRAM(S): 5 TABLET ORAL at 22:28

## 2021-11-07 RX ADMIN — HYDROMORPHONE HYDROCHLORIDE 0.5 MILLIGRAM(S): 2 INJECTION INTRAMUSCULAR; INTRAVENOUS; SUBCUTANEOUS at 18:13

## 2021-11-07 RX ADMIN — HYDROMORPHONE HYDROCHLORIDE 0.5 MILLIGRAM(S): 2 INJECTION INTRAMUSCULAR; INTRAVENOUS; SUBCUTANEOUS at 06:27

## 2021-11-07 RX ADMIN — HYDROMORPHONE HYDROCHLORIDE 0.5 MILLIGRAM(S): 2 INJECTION INTRAMUSCULAR; INTRAVENOUS; SUBCUTANEOUS at 18:25

## 2021-11-07 RX ADMIN — GABAPENTIN 400 MILLIGRAM(S): 400 CAPSULE ORAL at 21:58

## 2021-11-07 RX ADMIN — Medication 125 MICROGRAM(S): at 05:48

## 2021-11-07 RX ADMIN — Medication 975 MILLIGRAM(S): at 22:28

## 2021-11-07 RX ADMIN — Medication 400 MILLIGRAM(S): at 18:51

## 2021-11-07 RX ADMIN — HEPARIN SODIUM 5000 UNIT(S): 5000 INJECTION INTRAVENOUS; SUBCUTANEOUS at 05:47

## 2021-11-07 RX ADMIN — MORPHINE SULFATE 15 MILLIGRAM(S): 50 CAPSULE, EXTENDED RELEASE ORAL at 11:10

## 2021-11-07 RX ADMIN — LIDOCAINE 1 PATCH: 4 CREAM TOPICAL at 08:57

## 2021-11-07 RX ADMIN — OXYCODONE HYDROCHLORIDE 10 MILLIGRAM(S): 5 TABLET ORAL at 21:58

## 2021-11-07 RX ADMIN — GABAPENTIN 300 MILLIGRAM(S): 400 CAPSULE ORAL at 05:47

## 2021-11-07 RX ADMIN — SENNA PLUS 2 TABLET(S): 8.6 TABLET ORAL at 21:59

## 2021-11-07 RX ADMIN — Medication 1000 UNIT(S): at 13:10

## 2021-11-07 RX ADMIN — MORPHINE SULFATE 15 MILLIGRAM(S): 50 CAPSULE, EXTENDED RELEASE ORAL at 10:21

## 2021-11-07 RX ADMIN — Medication 975 MILLIGRAM(S): at 14:00

## 2021-11-07 RX ADMIN — GABAPENTIN 400 MILLIGRAM(S): 400 CAPSULE ORAL at 14:00

## 2021-11-07 RX ADMIN — HYDROMORPHONE HYDROCHLORIDE 0.5 MILLIGRAM(S): 2 INJECTION INTRAMUSCULAR; INTRAVENOUS; SUBCUTANEOUS at 05:44

## 2021-11-07 RX ADMIN — Medication 975 MILLIGRAM(S): at 13:11

## 2021-11-07 NOTE — PROGRESS NOTE ADULT - PROBLEM SELECTOR PLAN 1
Patient with hx of L4-L5 transforaminal lumbar interbody fusion on 9/20/2018, now with acute atraumatic low back pain. CT L spine showing post surgical changes, no ftx- however limited  Tylenol for mild pain  Dilaudid 0.5mg q8prn for severe pain  Continue Robaxin to 1000 mg PO every 6 hours PRN  Continue Gabapentin 100 mg every 8 hours  MR T + L spine noted   F/u pain management recs  Pending MRI  no surgical intervention   PT recommends outpt PT Patient with hx of L4-L5 transforaminal lumbar interbody fusion on 9/20/2018, now with acute atraumatic low back pain. CT L spine showing post surgical changes, no ftx- however limited  Tylenol/Motrin standing for now  Oxy 5 mg for mild pain/Oxy 10 mg for moderate pain/Dilaudid 0.5mg q8prn for severe pain. Do not overlap immediate acting opioids  Continue Robaxin to 1000 mg PO every 6 hours PRN  Gabapentin 400 mg every 8 hours  Lidocaie patch to hips q12h on/12 h off  MR T + L spine noted   F/u pain management recs  Pending MRI, consider NSGY consult, PT consult for TENS  Holistic RN consult  no surgical intervention   PT recommends outpt PT

## 2021-11-07 NOTE — PROGRESS NOTE ADULT - SUBJECTIVE AND OBJECTIVE BOX
Juan Groves MD, MPH, PGY2  Pager [phone]  *Please page Night Float after 7 PM*    PROGRESS NOTE:     Patient is a 36y old  Female who presents with a chief complaint of Low back pain (06 Nov 2021 12:27)      SUBJECTIVE / OVERNIGHT EVENTS: NAEO. Patient with continued lower back pain with intermittent radiation to lower extremities, required PRN Dilaudid overnight. Denies saddle anesthesia, weakness, incontinence, CP, SOB, fevers, chills.        MEDICATIONS  (STANDING):  acetaminophen     Tablet .. 975 milliGRAM(s) Oral every 8 hours  cholecalciferol 1000 Unit(s) Oral daily  gabapentin 300 milliGRAM(s) Oral every 8 hours  heparin   Injectable 5000 Unit(s) SubCutaneous every 8 hours  influenza   Vaccine 0.5 milliLiter(s) IntraMuscular once  levothyroxine 125 MICROGram(s) Oral daily  lidocaine   4% Patch 1 Patch Transdermal every 24 hours  polyethylene glycol 3350 17 Gram(s) Oral daily  senna 2 Tablet(s) Oral at bedtime    MEDICATIONS  (PRN):  HYDROmorphone  Injectable 0.5 milliGRAM(s) IV Push every 8 hours PRN Severe Pain (7 - 10)  methocarbamol 500 milliGRAM(s) Oral every 12 hours PRN Muscle Spasm  morphine  IR 15 milliGRAM(s) Oral every 4 hours PRN Moderate Pain (4 - 6)      CAPILLARY BLOOD GLUCOSE        I&O's Summary    06 Nov 2021 08:01  -  07 Nov 2021 07:00  --------------------------------------------------------  IN: 600 mL / OUT: 0 mL / NET: 600 mL        PHYSICAL EXAM:  Vital Signs Last 24 Hrs  T(C): 36.9 (07 Nov 2021 05:40), Max: 37 (06 Nov 2021 15:11)  T(F): 98.4 (07 Nov 2021 05:40), Max: 98.6 (06 Nov 2021 15:11)  HR: 86 (07 Nov 2021 05:40) (68 - 86)  BP: 102/66 (07 Nov 2021 05:40) (102/66 - 117/73)  BP(mean): --  RR: 19 (07 Nov 2021 05:40) (18 - 19)  SpO2: 100% (07 Nov 2021 05:40) (99% - 100%)    CONSTITUTIONAL: NAD, well-developed  RESPIRATORY: Normal respiratory effort; lungs are clear to auscultation bilaterally  CARDIOVASCULAR: Regular rate and rhythm, normal S1 and S2, no murmur/rub/gallop; No lower extremity edema  ABDOMEN: Nontender to palpation, normoactive bowel sounds, no rebound/guarding; No hepatosplenomegaly  MUSCLOSKELETAL: non-focal, 5/5 UE strength, unable to asses LE strength due to pain, sensation intact to light touch and temperature throughout  PSYCH: A+O to person, place, and time; affect appropriate    LABS:                        10.8   7.73  )-----------( 237      ( 06 Nov 2021 07:41 )             34.2     11-06    139  |  105  |  11  ----------------------------<  88  4.5   |  25  |  0.73    Ca    8.0<L>      06 Nov 2021 07:41  Phos  2.9     11-06  Mg     2.10     11-06                  RADIOLOGY & ADDITIONAL TESTS:  Results Reviewed:   Imaging Personally Reviewed:  Electrocardiogram Personally Reviewed:    COORDINATION OF CARE:  Care Discussed with Consultants/Other Providers [Y/N]:  Prior or Outpatient Records Reviewed [Y/N]:

## 2021-11-08 DIAGNOSIS — Z29.9 ENCOUNTER FOR PROPHYLACTIC MEASURES, UNSPECIFIED: ICD-10-CM

## 2021-11-08 PROCEDURE — 99232 SBSQ HOSP IP/OBS MODERATE 35: CPT

## 2021-11-08 RX ADMIN — Medication 975 MILLIGRAM(S): at 13:35

## 2021-11-08 RX ADMIN — LIDOCAINE 1 PATCH: 4 CREAM TOPICAL at 13:33

## 2021-11-08 RX ADMIN — Medication 975 MILLIGRAM(S): at 05:34

## 2021-11-08 RX ADMIN — GABAPENTIN 400 MILLIGRAM(S): 400 CAPSULE ORAL at 21:31

## 2021-11-08 RX ADMIN — OXYCODONE HYDROCHLORIDE 10 MILLIGRAM(S): 5 TABLET ORAL at 14:23

## 2021-11-08 RX ADMIN — GABAPENTIN 400 MILLIGRAM(S): 400 CAPSULE ORAL at 05:34

## 2021-11-08 RX ADMIN — LIDOCAINE 1 PATCH: 4 CREAM TOPICAL at 18:52

## 2021-11-08 RX ADMIN — Medication 975 MILLIGRAM(S): at 14:23

## 2021-11-08 RX ADMIN — Medication 400 MILLIGRAM(S): at 14:23

## 2021-11-08 RX ADMIN — Medication 125 MICROGRAM(S): at 05:34

## 2021-11-08 RX ADMIN — Medication 400 MILLIGRAM(S): at 09:24

## 2021-11-08 RX ADMIN — HEPARIN SODIUM 5000 UNIT(S): 5000 INJECTION INTRAVENOUS; SUBCUTANEOUS at 13:36

## 2021-11-08 RX ADMIN — Medication 400 MILLIGRAM(S): at 22:01

## 2021-11-08 RX ADMIN — POLYETHYLENE GLYCOL 3350 17 GRAM(S): 17 POWDER, FOR SOLUTION ORAL at 12:47

## 2021-11-08 RX ADMIN — Medication 400 MILLIGRAM(S): at 01:33

## 2021-11-08 RX ADMIN — Medication 975 MILLIGRAM(S): at 05:49

## 2021-11-08 RX ADMIN — Medication 1000 UNIT(S): at 12:48

## 2021-11-08 RX ADMIN — LIDOCAINE 1 PATCH: 4 CREAM TOPICAL at 01:39

## 2021-11-08 RX ADMIN — OXYCODONE HYDROCHLORIDE 10 MILLIGRAM(S): 5 TABLET ORAL at 09:28

## 2021-11-08 RX ADMIN — GABAPENTIN 400 MILLIGRAM(S): 400 CAPSULE ORAL at 13:34

## 2021-11-08 RX ADMIN — HYDROMORPHONE HYDROCHLORIDE 0.5 MILLIGRAM(S): 2 INJECTION INTRAMUSCULAR; INTRAVENOUS; SUBCUTANEOUS at 05:49

## 2021-11-08 RX ADMIN — HYDROMORPHONE HYDROCHLORIDE 0.5 MILLIGRAM(S): 2 INJECTION INTRAMUSCULAR; INTRAVENOUS; SUBCUTANEOUS at 05:34

## 2021-11-08 RX ADMIN — Medication 400 MILLIGRAM(S): at 10:20

## 2021-11-08 RX ADMIN — OXYCODONE HYDROCHLORIDE 10 MILLIGRAM(S): 5 TABLET ORAL at 10:20

## 2021-11-08 RX ADMIN — HYDROMORPHONE HYDROCHLORIDE 0.5 MILLIGRAM(S): 2 INJECTION INTRAMUSCULAR; INTRAVENOUS; SUBCUTANEOUS at 17:43

## 2021-11-08 RX ADMIN — HEPARIN SODIUM 5000 UNIT(S): 5000 INJECTION INTRAVENOUS; SUBCUTANEOUS at 05:33

## 2021-11-08 RX ADMIN — SENNA PLUS 2 TABLET(S): 8.6 TABLET ORAL at 21:31

## 2021-11-08 RX ADMIN — Medication 400 MILLIGRAM(S): at 13:34

## 2021-11-08 RX ADMIN — Medication 975 MILLIGRAM(S): at 21:31

## 2021-11-08 RX ADMIN — HYDROMORPHONE HYDROCHLORIDE 0.5 MILLIGRAM(S): 2 INJECTION INTRAMUSCULAR; INTRAVENOUS; SUBCUTANEOUS at 18:00

## 2021-11-08 RX ADMIN — Medication 400 MILLIGRAM(S): at 21:32

## 2021-11-08 RX ADMIN — LIDOCAINE 1 PATCH: 4 CREAM TOPICAL at 13:34

## 2021-11-08 RX ADMIN — Medication 975 MILLIGRAM(S): at 22:01

## 2021-11-08 RX ADMIN — OXYCODONE HYDROCHLORIDE 10 MILLIGRAM(S): 5 TABLET ORAL at 13:35

## 2021-11-08 RX ADMIN — HEPARIN SODIUM 5000 UNIT(S): 5000 INJECTION INTRAVENOUS; SUBCUTANEOUS at 21:30

## 2021-11-08 RX ADMIN — Medication 400 MILLIGRAM(S): at 02:03

## 2021-11-08 RX ADMIN — PANTOPRAZOLE SODIUM 40 MILLIGRAM(S): 20 TABLET, DELAYED RELEASE ORAL at 07:59

## 2021-11-08 NOTE — PROGRESS NOTE ADULT - PROBLEM SELECTOR PLAN 2
Patient with papillary thyroid Ca s/p hemithyroidectomy on synthroid  c/w synthroid 137 Patient with papillary thyroid Ca s/p hemithyroidectomy on synthroid  c/w synthroid 137.

## 2021-11-08 NOTE — PROGRESS NOTE ADULT - PROBLEM SELECTOR PLAN 3
Patient endorsing reflux and dyspepsia  c/w pantoprazole daily Patient endorsing reflux and dyspepsia  c/w pantoprazole daily.

## 2021-11-08 NOTE — PROGRESS NOTE ADULT - PROBLEM SELECTOR PLAN 4
improving, SBP 120s-130s   Pt with episodes of hypotension ?in the setting of pain meds s/p 1l ns bolus  pt asymptomatic Diet: regular   DVT prophylaxis: subcutaneous heparin   Dispo: PT recommending outpatient PT, pending PM+R consult

## 2021-11-08 NOTE — PROGRESS NOTE ADULT - PROBLEM SELECTOR PLAN 1
Patient with hx of L4-L5 transforaminal lumbar interbody fusion on 9/20/2018, now with acute atraumatic low back pain. CT L spine showing post surgical changes, no ftx- however limited  Tylenol/Motrin standing for now  Oxy 5 mg for mild pain/Oxy 10 mg for moderate pain/Dilaudid 0.5mg q8prn for severe pain. Do not overlap immediate acting opioids  Continue Robaxin to 1000 mg PO every 6 hours PRN  Gabapentin 400 mg every 8 hours  Lidocaie patch to hips q12h on/12 h off  MR T + L spine noted   F/u pain management recs  Pending MRI, consider NSGY consult, PT consult for TENS  Holistic RN consult  no surgical intervention   PT recommends outpt PT Patient with hx of L4-L5 transforaminal lumbar interbody fusion on 9/20/2018, now with acute atraumatic low back pain.   - pain management following   - Tylenol/Motrin standing for now, Oxy 5 mg for mild pain/Oxy 10 mg for moderate pain/Dilaudid 0.5mg q8prn for severe pain.  - Continue Robaxin to 1000 mg PO every 6 hours PRN  - Gabapentin 400 mg every 8 hours  - Lidocaine patch to hips q12h on/12 h off  - Pending MRI, consider NSGY consult  - PM+R consult for TENS/ acute rehab evaluation/ possible referral to spine clinic

## 2021-11-08 NOTE — PROGRESS NOTE ADULT - SUBJECTIVE AND OBJECTIVE BOX
PROGRESS NOTE:   Authored by Dawna Barber MD PGY-1  Pager [phone] [facility], [id number] [facility]   Please page night float  after 7PM    Patient is a 36y old  Female who presents with a chief complaint of Low back pain (07 Nov 2021 09:09)      SUBJECTIVE / OVERNIGHT EVENTS:    ADDITIONAL REVIEW OF SYSTEMS:    MEDICATIONS  (STANDING):  acetaminophen     Tablet .. 975 milliGRAM(s) Oral every 8 hours  cholecalciferol 1000 Unit(s) Oral daily  gabapentin 400 milliGRAM(s) Oral every 8 hours  heparin   Injectable 5000 Unit(s) SubCutaneous every 8 hours  ibuprofen  Tablet. 400 milliGRAM(s) Oral every 8 hours  influenza   Vaccine 0.5 milliLiter(s) IntraMuscular once  levothyroxine 125 MICROGram(s) Oral daily  lidocaine   4% Patch 1 Patch Transdermal every 24 hours  lidocaine   4% Patch 1 Patch Transdermal every 24 hours  pantoprazole    Tablet 40 milliGRAM(s) Oral before breakfast  polyethylene glycol 3350 17 Gram(s) Oral daily  senna 2 Tablet(s) Oral at bedtime    MEDICATIONS  (PRN):  HYDROmorphone  Injectable 0.5 milliGRAM(s) IV Push every 8 hours PRN Severe Pain (7 - 10)  methocarbamol 500 milliGRAM(s) Oral every 12 hours PRN Muscle Spasm  oxyCODONE    IR 5 milliGRAM(s) Oral every 4 hours PRN Mild Pain (1 - 3)  oxyCODONE    IR 10 milliGRAM(s) Oral every 4 hours PRN Moderate Pain (4 - 6)      CAPILLARY BLOOD GLUCOSE        I&O's Summary      PHYSICAL EXAM:  Vital Signs Last 24 Hrs  T(C): 36.9 (08 Nov 2021 05:34), Max: 37.1 (07 Nov 2021 15:25)  T(F): 98.4 (08 Nov 2021 05:34), Max: 98.7 (07 Nov 2021 15:25)  HR: 60 (08 Nov 2021 05:34) (60 - 69)  BP: 100/81 (08 Nov 2021 05:34) (100/81 - 125/78)  BP(mean): --  RR: 19 (08 Nov 2021 05:34) (17 - 19)  SpO2: 100% (08 Nov 2021 05:34) (100% - 100%)    CONSTITUTIONAL: NAD, well-developed  RESPIRATORY: Normal respiratory effort; lungs are clear to auscultation bilaterally  CARDIOVASCULAR: Regular rate and rhythm, normal S1 and S2, no murmur/rub/gallop; No lower extremity edema; Peripheral pulses are 2+ bilaterally  ABDOMEN: Nontender to palpation, normoactive bowel sounds, no rebound/guarding  MUSCULOSKELETAL: no clubbing or cyanosis of digits; no joint swelling or tenderness to palpation  PSYCH: A+O to person, place, and time; affect appropriate    LABS:                        10.8   7.73  )-----------( 237      ( 06 Nov 2021 07:41 )             34.2     11-06    139  |  105  |  11  ----------------------------<  88  4.5   |  25  |  0.73    Ca    8.0<L>      06 Nov 2021 07:41  Phos  2.9     11-06  Mg     2.10     11-06                  RADIOLOGY & ADDITIONAL TESTS:  Results Reviewed:   Imaging Personally Reviewed:  Electrocardiogram Personally Reviewed:    COORDINATION OF CARE:  Care Discussed with Consultants/Other Providers [Y/N]:  Prior or Outpatient Records Reviewed [Y/N]:   PROGRESS NOTE:   Authored by Dawna Barber MD PGY-1  Pager [phone] [facility], [address] [facility]   Please page night float  after 7PM    Patient is a 36y old  Female who presents with a chief complaint of Low back pain (07 Nov 2021 09:09)      SUBJECTIVE / OVERNIGHT EVENTS: No acute events overnight. This AM patient complains of continued back pain radiating down both legs. States the pain control is well controlled on current pain regimen but she still requires medication for pain ATC. Patient eager to get MRI and more answers about how to treat the cause of her pain.     ADDITIONAL REVIEW OF SYSTEMS: negative     MEDICATIONS  (STANDING):  acetaminophen     Tablet .. 975 milliGRAM(s) Oral every 8 hours  cholecalciferol 1000 Unit(s) Oral daily  gabapentin 400 milliGRAM(s) Oral every 8 hours  heparin   Injectable 5000 Unit(s) SubCutaneous every 8 hours  ibuprofen  Tablet. 400 milliGRAM(s) Oral every 8 hours  influenza   Vaccine 0.5 milliLiter(s) IntraMuscular once  levothyroxine 125 MICROGram(s) Oral daily  lidocaine   4% Patch 1 Patch Transdermal every 24 hours  lidocaine   4% Patch 1 Patch Transdermal every 24 hours  pantoprazole    Tablet 40 milliGRAM(s) Oral before breakfast  polyethylene glycol 3350 17 Gram(s) Oral daily  senna 2 Tablet(s) Oral at bedtime    MEDICATIONS  (PRN):  HYDROmorphone  Injectable 0.5 milliGRAM(s) IV Push every 8 hours PRN Severe Pain (7 - 10)  methocarbamol 500 milliGRAM(s) Oral every 12 hours PRN Muscle Spasm  oxyCODONE    IR 5 milliGRAM(s) Oral every 4 hours PRN Mild Pain (1 - 3)  oxyCODONE    IR 10 milliGRAM(s) Oral every 4 hours PRN Moderate Pain (4 - 6)      CAPILLARY BLOOD GLUCOSE        I&O's Summary      PHYSICAL EXAM:  Vital Signs Last 24 Hrs  T(C): 36.9 (08 Nov 2021 05:34), Max: 37.1 (07 Nov 2021 15:25)  T(F): 98.4 (08 Nov 2021 05:34), Max: 98.7 (07 Nov 2021 15:25)  HR: 60 (08 Nov 2021 05:34) (60 - 69)  BP: 100/81 (08 Nov 2021 05:34) (100/81 - 125/78)  BP(mean): --  RR: 19 (08 Nov 2021 05:34) (17 - 19)  SpO2: 100% (08 Nov 2021 05:34) (100% - 100%)    CONSTITUTIONAL: NAD, well-developed  RESPIRATORY: Normal respiratory effort; lungs are clear to auscultation bilaterally  CARDIOVASCULAR: Regular rate and rhythm, normal S1 and S2, no murmur/rub/gallop; No lower extremity edema  ABDOMEN: Nontender to palpation, normoactive bowel sounds, no rebound/guarding; No hepatosplenomegaly  MUSCLOSKELETAL: non-focal, 5/5 UE strength, unable to asses LE strength due to pain, sensation intact to light touch and temperature throughout  PSYCH: A+O to person, place, and time; affect appropriate    LABS:                        10.8   7.73  )-----------( 237      ( 06 Nov 2021 07:41 )             34.2     11-06    139  |  105  |  11  ----------------------------<  88  4.5   |  25  |  0.73    Ca    8.0<L>      06 Nov 2021 07:41  Phos  2.9     11-06  Mg     2.10     11-06                  RADIOLOGY & ADDITIONAL TESTS:  Results Reviewed:   Imaging Personally Reviewed:  Electrocardiogram Personally Reviewed:    COORDINATION OF CARE:  Care Discussed with Consultants/Other Providers [Y/N]: pain management   Prior or Outpatient Records Reviewed [Y/N]:

## 2021-11-08 NOTE — PROGRESS NOTE ADULT - ASSESSMENT
35F with PMH spinal fusion x2 years ago and papillary thyroid cancer s/p hemithyroidectomy who presents with severe back pain x 1 day 35F with PMH spinal fusion x2 years ago and papillary thyroid cancer s/p hemithyroidectomy who presents with severe back pain. Patient pending MRI.  No

## 2021-11-09 PROCEDURE — 99222 1ST HOSP IP/OBS MODERATE 55: CPT

## 2021-11-09 PROCEDURE — 72158 MRI LUMBAR SPINE W/O & W/DYE: CPT | Mod: 26

## 2021-11-09 PROCEDURE — 99232 SBSQ HOSP IP/OBS MODERATE 35: CPT | Mod: GC

## 2021-11-09 RX ORDER — OXYCODONE HYDROCHLORIDE 5 MG/1
10 TABLET ORAL EVERY 4 HOURS
Refills: 0 | Status: DISCONTINUED | OUTPATIENT
Start: 2021-11-09 | End: 2021-11-10

## 2021-11-09 RX ORDER — IBUPROFEN 200 MG
400 TABLET ORAL EVERY 8 HOURS
Refills: 0 | Status: DISCONTINUED | OUTPATIENT
Start: 2021-11-09 | End: 2021-11-10

## 2021-11-09 RX ORDER — OXYCODONE HYDROCHLORIDE 5 MG/1
5 TABLET ORAL EVERY 4 HOURS
Refills: 0 | Status: DISCONTINUED | OUTPATIENT
Start: 2021-11-09 | End: 2021-11-10

## 2021-11-09 RX ADMIN — HYDROMORPHONE HYDROCHLORIDE 0.5 MILLIGRAM(S): 2 INJECTION INTRAMUSCULAR; INTRAVENOUS; SUBCUTANEOUS at 06:14

## 2021-11-09 RX ADMIN — Medication 1000 UNIT(S): at 12:09

## 2021-11-09 RX ADMIN — LIDOCAINE 1 PATCH: 4 CREAM TOPICAL at 01:00

## 2021-11-09 RX ADMIN — Medication 975 MILLIGRAM(S): at 06:20

## 2021-11-09 RX ADMIN — Medication 975 MILLIGRAM(S): at 15:39

## 2021-11-09 RX ADMIN — GABAPENTIN 400 MILLIGRAM(S): 400 CAPSULE ORAL at 21:46

## 2021-11-09 RX ADMIN — METHOCARBAMOL 500 MILLIGRAM(S): 500 TABLET, FILM COATED ORAL at 23:03

## 2021-11-09 RX ADMIN — Medication 0.5 MILLIGRAM(S): at 14:16

## 2021-11-09 RX ADMIN — Medication 975 MILLIGRAM(S): at 21:46

## 2021-11-09 RX ADMIN — OXYCODONE HYDROCHLORIDE 10 MILLIGRAM(S): 5 TABLET ORAL at 19:31

## 2021-11-09 RX ADMIN — HEPARIN SODIUM 5000 UNIT(S): 5000 INJECTION INTRAVENOUS; SUBCUTANEOUS at 06:13

## 2021-11-09 RX ADMIN — OXYCODONE HYDROCHLORIDE 10 MILLIGRAM(S): 5 TABLET ORAL at 02:55

## 2021-11-09 RX ADMIN — SENNA PLUS 2 TABLET(S): 8.6 TABLET ORAL at 21:47

## 2021-11-09 RX ADMIN — LIDOCAINE 1 PATCH: 4 CREAM TOPICAL at 19:07

## 2021-11-09 RX ADMIN — OXYCODONE HYDROCHLORIDE 10 MILLIGRAM(S): 5 TABLET ORAL at 23:03

## 2021-11-09 RX ADMIN — OXYCODONE HYDROCHLORIDE 10 MILLIGRAM(S): 5 TABLET ORAL at 18:44

## 2021-11-09 RX ADMIN — POLYETHYLENE GLYCOL 3350 17 GRAM(S): 17 POWDER, FOR SOLUTION ORAL at 12:24

## 2021-11-09 RX ADMIN — LIDOCAINE 1 PATCH: 4 CREAM TOPICAL at 15:41

## 2021-11-09 RX ADMIN — Medication 975 MILLIGRAM(S): at 22:46

## 2021-11-09 RX ADMIN — PANTOPRAZOLE SODIUM 40 MILLIGRAM(S): 20 TABLET, DELAYED RELEASE ORAL at 06:14

## 2021-11-09 RX ADMIN — Medication 975 MILLIGRAM(S): at 06:13

## 2021-11-09 RX ADMIN — Medication 400 MILLIGRAM(S): at 06:29

## 2021-11-09 RX ADMIN — Medication 400 MILLIGRAM(S): at 06:14

## 2021-11-09 RX ADMIN — Medication 975 MILLIGRAM(S): at 18:36

## 2021-11-09 RX ADMIN — GABAPENTIN 400 MILLIGRAM(S): 400 CAPSULE ORAL at 15:39

## 2021-11-09 RX ADMIN — OXYCODONE HYDROCHLORIDE 10 MILLIGRAM(S): 5 TABLET ORAL at 02:40

## 2021-11-09 RX ADMIN — LIDOCAINE 1 PATCH: 4 CREAM TOPICAL at 19:06

## 2021-11-09 RX ADMIN — OXYCODONE HYDROCHLORIDE 10 MILLIGRAM(S): 5 TABLET ORAL at 13:15

## 2021-11-09 RX ADMIN — Medication 125 MICROGRAM(S): at 06:14

## 2021-11-09 RX ADMIN — LIDOCAINE 1 PATCH: 4 CREAM TOPICAL at 15:40

## 2021-11-09 RX ADMIN — OXYCODONE HYDROCHLORIDE 10 MILLIGRAM(S): 5 TABLET ORAL at 12:15

## 2021-11-09 RX ADMIN — GABAPENTIN 400 MILLIGRAM(S): 400 CAPSULE ORAL at 06:14

## 2021-11-09 RX ADMIN — Medication 975 MILLIGRAM(S): at 16:39

## 2021-11-09 RX ADMIN — HYDROMORPHONE HYDROCHLORIDE 0.5 MILLIGRAM(S): 2 INJECTION INTRAMUSCULAR; INTRAVENOUS; SUBCUTANEOUS at 06:29

## 2021-11-09 NOTE — CONSULT NOTE ADULT - SUBJECTIVE AND OBJECTIVE BOX
Patient is a 36y old  Female who presents with a chief complaint of Low back pain (09 Nov 2021 07:18)      HPI:  Patient is a 35 y/o F hx thryoid cancer s/p hemithyroidectomy, chronic LBP 2/2 herniated disc s/p L4/L5 fusion 2018, who presents with acute low back pain. She was bending forward today and felt a snap and had acute low back pain after hearing a pop. She generally has radiculopathy towards the left posterior thigh, but now it is towards the right posterior thigh as well. She states this is worse than usual, and had difficulty with ambulation, though with assistance or a walker was able to get around. She was able to take the bus to the ED. She follows outpt pain specialist due to chronic LBP, and receives epidural injections for it. Takes ibuprofen, advil-acetaminophen, gabapentin, and methocarbamol. She denies dysuria, urinary incontinence, or decreased sensation around groin or anal area. She denies fevers, chills, nausea, vomiting, diarrhea, sob, cp, visual changes, headaches, or skin rashes.     ED vitals: afebrile, HR 74, /67, RR 18 99% RA  xray lumbar without acute findings  CT lumbar performed   	 (05 Nov 2021 00:02)      REVIEW OF SYSTEMS  Constitutional - No fever, No weight loss, No fatigue  HEENT - No eye pain, No visual disturbances, No difficulty hearing, No tinnitus, No vertigo, No neck pain  Respiratory - No cough, No wheezing, No shortness of breath  Cardiovascular - No chest pain, No palpitations  Gastrointestinal - No abdominal pain, No nausea, No vomiting, No diarrhea, No constipation  Genitourinary - No dysuria, No frequency, No hematuria, No incontinence  Neurological - No headaches, No memory loss, No loss of strength, No numbness, No tremors  Skin - No itching, No rashes, No lesions   Endocrine - No temperature intolerance  Musculoskeletal - + pain  Psychiatric - No depression, No anxiety    PAST MEDICAL & SURGICAL HISTORY   Herniated nucleus pulposus, L4-5    Thyroid cancer    Irregular menstruation    Acid reflux    Lumbar herniated disc    Paresthesia of right leg     H/O microdiscectomy    S/P partial thyroidectomy      SOCIAL HISTORY  Smoking - Denied  EtOH - Denied   Drugs - Denied    FUNCTIONAL HISTORY  Lives   Independent    CURRENT FUNCTIONAL STATUS      FAMILY HISTORY    Family history of thyroid cancer        RECENT LABS/IMAGING    < from: Xray Lumbar Spine AP + Lateral (11.04.21 @ 18:47) >    EXAM:  XR LS SPINE AP LAT 2-3 VIEWS        PROCEDURE DATE:  Nov 4 2021         INTERPRETATION:  CLINICAL INDICATION: Acute on chronic lower back pain. History of spinal fusion.    TECHNIQUE: X-ray lumbar spine 2 views.    COMPARISON: X-ray lumbar spine 2/15/2019.    FINDINGS:  Lumbar lordosis is maintained.  There is no spondylolisthesis.  Vertebral body heights are intact.  The disc heights are preserved.  Status post L4-L5 spinal fusion, unchanged and intact.  L4 laminectomy and fasciectomy,unchanged.    IMPRESSION:  No acute fracture or dislocation.    --- End of Report ---        < end of copied text >           VITALS  T(C): 36.7 (11-09-21 @ 06:13), Max: 37 (11-08-21 @ 14:16)  HR: 68 (11-09-21 @ 06:13) (64 - 85)  BP: 122/84 (11-09-21 @ 06:13) (111/66 - 122/84)  RR: 18 (11-09-21 @ 06:13) (17 - 18)  SpO2: 100% (11-09-21 @ 06:13) (100% - 100%)  Wt(kg): --    ALLERGIES  tramadol (Pruritus)  Voltaren (Flushing; Urticaria; Rash; Swelling)  Voltaren (Pruritus; Hives)      MEDICATIONS   acetaminophen     Tablet .. 975 milliGRAM(s) Oral every 8 hours  cholecalciferol 1000 Unit(s) Oral daily  gabapentin 400 milliGRAM(s) Oral every 8 hours  heparin   Injectable 5000 Unit(s) SubCutaneous every 8 hours  ibuprofen  Tablet. 400 milliGRAM(s) Oral every 8 hours PRN  influenza   Vaccine 0.5 milliLiter(s) IntraMuscular once  levothyroxine 125 MICROGram(s) Oral daily  lidocaine   4% Patch 1 Patch Transdermal every 24 hours  lidocaine   4% Patch 1 Patch Transdermal every 24 hours  LORazepam     Tablet 0.5 milliGRAM(s) Oral once PRN  methocarbamol 500 milliGRAM(s) Oral every 12 hours PRN  oxyCODONE    IR 10 milliGRAM(s) Oral every 4 hours PRN  oxyCODONE    IR 5 milliGRAM(s) Oral every 4 hours PRN  pantoprazole    Tablet 40 milliGRAM(s) Oral before breakfast  polyethylene glycol 3350 17 Gram(s) Oral daily  senna 2 Tablet(s) Oral at bedtime      ----------------------------------------------------------------------------------------  PHYSICAL EXAM - incomplete  Constitutional - NAD, Comfortable  HEENT - NCAT, EOMI  Neck - Supple, No limited ROM  Chest - CTA bilaterally, No wheeze, No rhonchi, No crackles  Cardiovascular - RRR, S1S2, No murmurs  Abdomen - BS+, Soft, NTND  Extremities - No C/C/E, No calf tenderness   Neurologic Exam -                    Cognitive - Awake, Alert, AAO to self, place, date, year, situation     Communication - Fluent, No dysarthria     Cranial Nerves - CN 2-12 intact     Motor - No focal deficits                    LEFT    UE - ShAB 5/5, EF 5/5, EE 5/5, WE 5/5,  5/5                    RIGHT UE - ShAB 5/5, EF 5/5, EE 5/5, WE 5/5,  5/5                    LEFT    LE - HF 5/5, KE 5/5, DF 5/5, PF 5/5                    RIGHT LE - HF 5/5, KE 5/5, DF 5/5, PF 5/5        Sensory - Intact to LT     Reflexes - DTR Intact, No primitive reflexive     Coordination - FTN intact     OculoVestibular - No saccades, No nystagmus, VOR         Balance - WNL Static  Psychiatric - Mood stable, Affect WNL  ----------------------------------------------------------------------------------------  ASSESSMENT/PLAN    Pain -  DVT PPX -   Rehab -        incomplete note- consult in progress Patient is a 36y old  Female who presents with a chief complaint of Low back pain (09 Nov 2021 07:18)      HPI:  Patient is a 37 y/o F hx thryoid cancer s/p hemithyroidectomy, chronic LBP 2/2 herniated disc s/p L4/L5 fusion 2018, who presents with acute low back pain. She was bending forward today and felt a snap and had acute low back pain after hearing a pop. She generally has radiculopathy towards the left posterior thigh, but now it is towards the right posterior thigh as well. She states this is worse than usual, and had difficulty with ambulation, though with assistance or a walker was able to get around. She was able to take the bus to the ED. She follows outpt pain specialist due to chronic LBP, and receives epidural injections for it. Takes ibuprofen, advil-acetaminophen, gabapentin, and methocarbamol. She denies dysuria, urinary incontinence, or decreased sensation around groin or anal area. She denies fevers, chills, nausea, vomiting, diarrhea, sob, cp, visual changes, headaches, or skin rashes.     ED vitals: afebrile, HR 74, /67, RR 18 99% RA  xray lumbar without acute findings  CT lumbar performed   	 (05 Nov 2021 00:02)    patient ambulating in the room. reports b/l LE pain, L>R  going for imaging    REVIEW OF SYSTEMS  Constitutional - No fever, No weight loss, No fatigue  HEENT - No eye pain, No visual disturbances, No difficulty hearing, No tinnitus, No vertigo, No neck pain  Respiratory - No cough, No wheezing, No shortness of breath  Cardiovascular - No chest pain, No palpitations  Gastrointestinal - No abdominal pain, No nausea, No vomiting, No diarrhea, No constipation  Genitourinary - No dysuria, No frequency, No hematuria, No incontinence  Neurological - No headaches, No memory loss, + loss of strength, No numbness, No tremors  Skin - No itching, No rashes, No lesions   Endocrine - No temperature intolerance  Musculoskeletal - + pain  Psychiatric - No depression, No anxiety    PAST MEDICAL & SURGICAL HISTORY   Herniated nucleus pulposus, L4-5    Thyroid cancer    Irregular menstruation    Acid reflux    Lumbar herniated disc    Paresthesia of right leg     H/O microdiscectomy    S/P partial thyroidectomy       FUNCTIONAL HISTORY  Lives with mother in basement apartment with 1 flight of stairs  Independent at baseline    CURRENT FUNCTIONAL STATUS  Bed Mobility  Bed Mobility Training Supine-to-Sit: independent    Sit-Stand Transfer Training  Transfer Training Sit-to-Stand Transfer: independent  Transfer Training Stand-to-Sit Transfer: independent    Gait Training  Gait Training: independent;  200 feet    Stair Training  Physical Assist/Nonphysical Assist: Independent   Assistive Device: Did not use railings, though available  Number of Stairs: 4         FAMILY HISTORY    Family history of thyroid cancer      RECENT LABS/IMAGING    < from: Xray Lumbar Spine AP + Lateral (11.04.21 @ 18:47) >    EXAM:  XR LS SPINE AP LAT 2-3 VIEWS        PROCEDURE DATE:  Nov 4 2021         INTERPRETATION:  CLINICAL INDICATION: Acute on chronic lower back pain. History of spinal fusion.    TECHNIQUE: X-ray lumbar spine 2 views.    COMPARISON: X-ray lumbar spine 2/15/2019.    FINDINGS:  Lumbar lordosis is maintained.  There is no spondylolisthesis.  Vertebral body heights are intact.  The disc heights are preserved.  Status post L4-L5 spinal fusion, unchanged and intact.  L4 laminectomy and fasciectomy,unchanged.    IMPRESSION:  No acute fracture or dislocation.    --- End of Report ---        < end of copied text >       VITALS  T(C): 36.7 (11-09-21 @ 06:13), Max: 37 (11-08-21 @ 14:16)  HR: 68 (11-09-21 @ 06:13) (64 - 85)  BP: 122/84 (11-09-21 @ 06:13) (111/66 - 122/84)  RR: 18 (11-09-21 @ 06:13) (17 - 18)  SpO2: 100% (11-09-21 @ 06:13) (100% - 100%)  Wt(kg): --    ALLERGIES  tramadol (Pruritus)  Voltaren (Flushing; Urticaria; Rash; Swelling)  Voltaren (Pruritus; Hives)      MEDICATIONS   acetaminophen     Tablet .. 975 milliGRAM(s) Oral every 8 hours  cholecalciferol 1000 Unit(s) Oral daily  gabapentin 400 milliGRAM(s) Oral every 8 hours  heparin   Injectable 5000 Unit(s) SubCutaneous every 8 hours  ibuprofen  Tablet. 400 milliGRAM(s) Oral every 8 hours PRN  influenza   Vaccine 0.5 milliLiter(s) IntraMuscular once  levothyroxine 125 MICROGram(s) Oral daily  lidocaine   4% Patch 1 Patch Transdermal every 24 hours  lidocaine   4% Patch 1 Patch Transdermal every 24 hours  LORazepam     Tablet 0.5 milliGRAM(s) Oral once PRN  methocarbamol 500 milliGRAM(s) Oral every 12 hours PRN  oxyCODONE    IR 10 milliGRAM(s) Oral every 4 hours PRN  oxyCODONE    IR 5 milliGRAM(s) Oral every 4 hours PRN  pantoprazole    Tablet 40 milliGRAM(s) Oral before breakfast  polyethylene glycol 3350 17 Gram(s) Oral daily  senna 2 Tablet(s) Oral at bedtime      ----------------------------------------------------------------------------------------  PHYSICAL EXAM -   Constitutional - NAD, Comfortable  HEENT - NCAT, EOMI  Neck - Supple, No limited ROM  Chest - no respiratory distress  Cardiovascular - RRR, S1S2   Abdomen -  Soft, NTND  Extremities - No C/C/E, No calf tenderness   Neurologic Exam -                    Cognitive - Awake, Alert, AAO to self, place, date, year, situation     Communication - Fluent, No dysarthria     Cranial Nerves - CN 2-12 intact     Motor -                      LEFT    UE - ShAB 5/5, EF 5/5, EE 5/5, WE 5/5,  5/5                    RIGHT UE - ShAB 5/5, EF 5/5, EE 5/5, WE 5/5,  5/5                    LEFT    LE - HF 4/5, KE 4+/5, DF 5/5, PF 5/5                    RIGHT LE - HF 4/5, KE 4+/5, DF 5/5, PF 5/5        Sensory - Intact to LT     Reflexes - DTR Intact     OculoVestibular - No saccades, No nystagmus, VOR         Balance - WNL Static  Psychiatric - Mood stable, Affect WNL  ----------------------------------------------------------------------------------------  ASSESSMENT/PLAN 35 yo f p/w worsening radicular low back pain    Pain -oxy ir prn, ibuprofen prn, lidocaine patch, robaxin, bowel regimen  Diet:dash/tlc,   DVT PPX - heparin   ambulated with bedside therapy, 200' with supervision and climbed stairs  Rehab -   workup in progress, anticipate patient will be able to go home when medically cleared. seen ambulating independently around the room.

## 2021-11-09 NOTE — PROGRESS NOTE ADULT - PROBLEM SELECTOR PLAN 4
Diet: regular   DVT prophylaxis: subcutaneous heparin   Dispo: PT recommending outpatient PT, pending PM+R consult

## 2021-11-09 NOTE — PROGRESS NOTE ADULT - SUBJECTIVE AND OBJECTIVE BOX
PROGRESS NOTE:   Authored by Dawna Barber MD PGY-1  Pager [phone] [facility], [id number] [address]   Please page night float  after 7PM    Patient is a 36y old  Female who presents with a chief complaint of Low back pain (08 Nov 2021 07:33)      SUBJECTIVE / OVERNIGHT EVENTS:    ADDITIONAL REVIEW OF SYSTEMS:    MEDICATIONS  (STANDING):  acetaminophen     Tablet .. 975 milliGRAM(s) Oral every 8 hours  cholecalciferol 1000 Unit(s) Oral daily  gabapentin 400 milliGRAM(s) Oral every 8 hours  heparin   Injectable 5000 Unit(s) SubCutaneous every 8 hours  ibuprofen  Tablet. 400 milliGRAM(s) Oral every 8 hours  influenza   Vaccine 0.5 milliLiter(s) IntraMuscular once  levothyroxine 125 MICROGram(s) Oral daily  lidocaine   4% Patch 1 Patch Transdermal every 24 hours  lidocaine   4% Patch 1 Patch Transdermal every 24 hours  pantoprazole    Tablet 40 milliGRAM(s) Oral before breakfast  polyethylene glycol 3350 17 Gram(s) Oral daily  senna 2 Tablet(s) Oral at bedtime    MEDICATIONS  (PRN):  HYDROmorphone  Injectable 0.5 milliGRAM(s) IV Push every 8 hours PRN Severe Pain (7 - 10)  LORazepam     Tablet 0.5 milliGRAM(s) Oral once PRN Anxiety  methocarbamol 500 milliGRAM(s) Oral every 12 hours PRN Muscle Spasm  oxyCODONE    IR 5 milliGRAM(s) Oral every 4 hours PRN Mild Pain (1 - 3)  oxyCODONE    IR 10 milliGRAM(s) Oral every 4 hours PRN Moderate Pain (4 - 6)      CAPILLARY BLOOD GLUCOSE        I&O's Summary    08 Nov 2021 07:01  -  09 Nov 2021 07:00  --------------------------------------------------------  IN: 2200 mL / OUT: 0 mL / NET: 2200 mL        PHYSICAL EXAM:  Vital Signs Last 24 Hrs  T(C): 36.7 (09 Nov 2021 06:13), Max: 37 (08 Nov 2021 14:16)  T(F): 98.1 (09 Nov 2021 06:13), Max: 98.6 (08 Nov 2021 14:16)  HR: 68 (09 Nov 2021 06:13) (64 - 85)  BP: 122/84 (09 Nov 2021 06:13) (111/66 - 122/84)  BP(mean): --  RR: 18 (09 Nov 2021 06:13) (17 - 18)  SpO2: 100% (09 Nov 2021 06:13) (100% - 100%)    CONSTITUTIONAL: NAD, well-developed  RESPIRATORY: Normal respiratory effort; lungs are clear to auscultation bilaterally  CARDIOVASCULAR: Regular rate and rhythm, normal S1 and S2, no murmur/rub/gallop; No lower extremity edema; Peripheral pulses are 2+ bilaterally  ABDOMEN: Nontender to palpation, normoactive bowel sounds, no rebound/guarding  MUSCULOSKELETAL: no clubbing or cyanosis of digits; no joint swelling or tenderness to palpation  PSYCH: A+O to person, place, and time; affect appropriate    LABS:                      RADIOLOGY & ADDITIONAL TESTS:  Results Reviewed:   Imaging Personally Reviewed:  Electrocardiogram Personally Reviewed:    COORDINATION OF CARE:  Care Discussed with Consultants/Other Providers [Y/N]:  Prior or Outpatient Records Reviewed [Y/N]:   PROGRESS NOTE:   Authored by Dawna Barber MD PGY-1  Pager [phone] [facility], [id number] [address]   Please page night float  after 7PM    Patient is a 36y old  Female who presents with a chief complaint of Low back pain (08 Nov 2021 07:33)      SUBJECTIVE / OVERNIGHT EVENTS: No acute events overnight. Patient seen and examined at bedside this AM. Patient feels unchanged from prior. States she has continued back pain radiating down her legs.     ADDITIONAL REVIEW OF SYSTEMS: negative     MEDICATIONS  (STANDING):  acetaminophen     Tablet .. 975 milliGRAM(s) Oral every 8 hours  cholecalciferol 1000 Unit(s) Oral daily  gabapentin 400 milliGRAM(s) Oral every 8 hours  heparin   Injectable 5000 Unit(s) SubCutaneous every 8 hours  ibuprofen  Tablet. 400 milliGRAM(s) Oral every 8 hours  influenza   Vaccine 0.5 milliLiter(s) IntraMuscular once  levothyroxine 125 MICROGram(s) Oral daily  lidocaine   4% Patch 1 Patch Transdermal every 24 hours  lidocaine   4% Patch 1 Patch Transdermal every 24 hours  pantoprazole    Tablet 40 milliGRAM(s) Oral before breakfast  polyethylene glycol 3350 17 Gram(s) Oral daily  senna 2 Tablet(s) Oral at bedtime    MEDICATIONS  (PRN):  HYDROmorphone  Injectable 0.5 milliGRAM(s) IV Push every 8 hours PRN Severe Pain (7 - 10)  LORazepam     Tablet 0.5 milliGRAM(s) Oral once PRN Anxiety  methocarbamol 500 milliGRAM(s) Oral every 12 hours PRN Muscle Spasm  oxyCODONE    IR 5 milliGRAM(s) Oral every 4 hours PRN Mild Pain (1 - 3)  oxyCODONE    IR 10 milliGRAM(s) Oral every 4 hours PRN Moderate Pain (4 - 6)      CAPILLARY BLOOD GLUCOSE        I&O's Summary    08 Nov 2021 07:01  -  09 Nov 2021 07:00  --------------------------------------------------------  IN: 2200 mL / OUT: 0 mL / NET: 2200 mL        PHYSICAL EXAM:  Vital Signs Last 24 Hrs  T(C): 36.7 (09 Nov 2021 06:13), Max: 37 (08 Nov 2021 14:16)  T(F): 98.1 (09 Nov 2021 06:13), Max: 98.6 (08 Nov 2021 14:16)  HR: 68 (09 Nov 2021 06:13) (64 - 85)  BP: 122/84 (09 Nov 2021 06:13) (111/66 - 122/84)  BP(mean): --  RR: 18 (09 Nov 2021 06:13) (17 - 18)  SpO2: 100% (09 Nov 2021 06:13) (100% - 100%)    CONSTITUTIONAL: NAD, well-developed  RESPIRATORY: Normal respiratory effort; lungs are clear to auscultation bilaterally  CARDIOVASCULAR: Regular rate and rhythm, normal S1 and S2, no murmur/rub/gallop; No lower extremity edema  ABDOMEN: Nontender to palpation, normoactive bowel sounds, no rebound/guarding; No hepatosplenomegaly  MUSCULOSKELETAL non-focal, 5/5 UE strength, unable to asses LE strength due to pain, sensation intact to light touch and temperature throughout  PSYCH: A+O to person, place, and time; affect appropriate    LABS:      RADIOLOGY & ADDITIONAL TESTS:  Results Reviewed:   Imaging Personally Reviewed:  Electrocardiogram Personally Reviewed:    COORDINATION OF CARE:  Care Discussed with Consultants/Other Providers [Y/N]:  Prior or Outpatient Records Reviewed [Y/N]:

## 2021-11-09 NOTE — PROGRESS NOTE ADULT - ASSESSMENT
35F with PMH spinal fusion x2 years ago and papillary thyroid cancer s/p hemithyroidectomy who presents with severe back pain. Patient pending MRI.

## 2021-11-09 NOTE — PROGRESS NOTE ADULT - PROBLEM SELECTOR PLAN 1
Patient with hx of L4-L5 transforaminal lumbar interbody fusion on 9/20/2018, now with acute atraumatic low back pain.   - pain management following   - Tylenol/Motrin standing for now, Oxy 5 mg for mild pain/Oxy 10 mg for moderate pain/Dilaudid 0.5mg q8prn for severe pain.  - Continue Robaxin to 1000 mg PO every 6 hours PRN  - Gabapentin 400 mg every 8 hours  - Lidocaine patch to hips q12h on/12 h off  - Pending MRI, consider NSGY consult  - PM+R consult for TENS/ acute rehab evaluation/ possible referral to spine clinic Patient with hx of L4-L5 transforaminal lumbar interbody fusion on 9/20/2018, now with acute atraumatic low back pain.   - Pain control with Tylenol and oral oxycodone, IV Dilaudid d/c'd 11/9   - Continue Robaxin to 1000 mg PO every 6 hours PRN  - Gabapentin 400 mg every 8 hours  - Lidocaine patch to hips q12h on/12 h off  - Pending MRI, consider NSGY consult  - PM+R consult for TENS/ acute rehab evaluation/ possible referral to spine clinic

## 2021-11-10 ENCOUNTER — TRANSCRIPTION ENCOUNTER (OUTPATIENT)
Age: 36
End: 2021-11-10

## 2021-11-10 VITALS
SYSTOLIC BLOOD PRESSURE: 107 MMHG | TEMPERATURE: 99 F | HEART RATE: 77 BPM | DIASTOLIC BLOOD PRESSURE: 64 MMHG | RESPIRATION RATE: 18 BRPM | OXYGEN SATURATION: 100 %

## 2021-11-10 LAB
ALBUMIN SERPL ELPH-MCNC: 3.6 G/DL — SIGNIFICANT CHANGE UP (ref 3.3–5)
ALP SERPL-CCNC: 43 U/L — SIGNIFICANT CHANGE UP (ref 40–120)
ALT FLD-CCNC: 29 U/L — SIGNIFICANT CHANGE UP (ref 4–33)
ANION GAP SERPL CALC-SCNC: 8 MMOL/L — SIGNIFICANT CHANGE UP (ref 7–14)
AST SERPL-CCNC: 21 U/L — SIGNIFICANT CHANGE UP (ref 4–32)
BASOPHILS # BLD AUTO: 0.03 K/UL — SIGNIFICANT CHANGE UP (ref 0–0.2)
BASOPHILS NFR BLD AUTO: 0.4 % — SIGNIFICANT CHANGE UP (ref 0–2)
BILIRUB SERPL-MCNC: 0.2 MG/DL — SIGNIFICANT CHANGE UP (ref 0.2–1.2)
BUN SERPL-MCNC: 12 MG/DL — SIGNIFICANT CHANGE UP (ref 7–23)
CALCIUM SERPL-MCNC: 9.1 MG/DL — SIGNIFICANT CHANGE UP (ref 8.4–10.5)
CHLORIDE SERPL-SCNC: 101 MMOL/L — SIGNIFICANT CHANGE UP (ref 98–107)
CO2 SERPL-SCNC: 28 MMOL/L — SIGNIFICANT CHANGE UP (ref 22–31)
CREAT SERPL-MCNC: 0.71 MG/DL — SIGNIFICANT CHANGE UP (ref 0.5–1.3)
EOSINOPHIL # BLD AUTO: 0.09 K/UL — SIGNIFICANT CHANGE UP (ref 0–0.5)
EOSINOPHIL NFR BLD AUTO: 1.1 % — SIGNIFICANT CHANGE UP (ref 0–6)
GLUCOSE SERPL-MCNC: 87 MG/DL — SIGNIFICANT CHANGE UP (ref 70–99)
HCT VFR BLD CALC: 36.5 % — SIGNIFICANT CHANGE UP (ref 34.5–45)
HGB BLD-MCNC: 11.4 G/DL — LOW (ref 11.5–15.5)
IANC: 3.58 K/UL — SIGNIFICANT CHANGE UP (ref 1.5–8.5)
IMM GRANULOCYTES NFR BLD AUTO: 0.5 % — SIGNIFICANT CHANGE UP (ref 0–1.5)
LYMPHOCYTES # BLD AUTO: 3.85 K/UL — HIGH (ref 1–3.3)
LYMPHOCYTES # BLD AUTO: 47.6 % — HIGH (ref 13–44)
MAGNESIUM SERPL-MCNC: 1.9 MG/DL — SIGNIFICANT CHANGE UP (ref 1.6–2.6)
MCHC RBC-ENTMCNC: 28.2 PG — SIGNIFICANT CHANGE UP (ref 27–34)
MCHC RBC-ENTMCNC: 31.2 GM/DL — LOW (ref 32–36)
MCV RBC AUTO: 90.3 FL — SIGNIFICANT CHANGE UP (ref 80–100)
MONOCYTES # BLD AUTO: 0.5 K/UL — SIGNIFICANT CHANGE UP (ref 0–0.9)
MONOCYTES NFR BLD AUTO: 6.2 % — SIGNIFICANT CHANGE UP (ref 2–14)
NEUTROPHILS # BLD AUTO: 3.58 K/UL — SIGNIFICANT CHANGE UP (ref 1.8–7.4)
NEUTROPHILS NFR BLD AUTO: 44.2 % — SIGNIFICANT CHANGE UP (ref 43–77)
NRBC # BLD: 0 /100 WBCS — SIGNIFICANT CHANGE UP
NRBC # FLD: 0 K/UL — SIGNIFICANT CHANGE UP
PHOSPHATE SERPL-MCNC: 4.2 MG/DL — SIGNIFICANT CHANGE UP (ref 2.5–4.5)
PLATELET # BLD AUTO: 253 K/UL — SIGNIFICANT CHANGE UP (ref 150–400)
POTASSIUM SERPL-MCNC: 4.2 MMOL/L — SIGNIFICANT CHANGE UP (ref 3.5–5.3)
POTASSIUM SERPL-SCNC: 4.2 MMOL/L — SIGNIFICANT CHANGE UP (ref 3.5–5.3)
PROT SERPL-MCNC: 6.4 G/DL — SIGNIFICANT CHANGE UP (ref 6–8.3)
RBC # BLD: 4.04 M/UL — SIGNIFICANT CHANGE UP (ref 3.8–5.2)
RBC # FLD: 13.2 % — SIGNIFICANT CHANGE UP (ref 10.3–14.5)
SODIUM SERPL-SCNC: 137 MMOL/L — SIGNIFICANT CHANGE UP (ref 135–145)
WBC # BLD: 8.09 K/UL — SIGNIFICANT CHANGE UP (ref 3.8–10.5)
WBC # FLD AUTO: 8.09 K/UL — SIGNIFICANT CHANGE UP (ref 3.8–10.5)

## 2021-11-10 PROCEDURE — 99232 SBSQ HOSP IP/OBS MODERATE 35: CPT

## 2021-11-10 PROCEDURE — 99239 HOSP IP/OBS DSCHRG MGMT >30: CPT

## 2021-11-10 RX ORDER — LIDOCAINE 4 G/100G
1 CREAM TOPICAL
Qty: 0 | Refills: 0 | DISCHARGE
Start: 2021-11-10

## 2021-11-10 RX ORDER — OXYCODONE HYDROCHLORIDE 5 MG/1
1 TABLET ORAL
Qty: 10 | Refills: 0
Start: 2021-11-10

## 2021-11-10 RX ORDER — BNT162B2 0.23 MG/2.25ML
0.3 INJECTION, SUSPENSION INTRAMUSCULAR ONCE
Refills: 0 | Status: DISCONTINUED | OUTPATIENT
Start: 2021-11-10 | End: 2021-11-10

## 2021-11-10 RX ORDER — OXYCODONE HYDROCHLORIDE 5 MG/1
1 TABLET ORAL
Qty: 24 | Refills: 0
Start: 2021-11-10 | End: 2021-11-13

## 2021-11-10 RX ADMIN — Medication 975 MILLIGRAM(S): at 14:26

## 2021-11-10 RX ADMIN — LIDOCAINE 1 PATCH: 4 CREAM TOPICAL at 13:53

## 2021-11-10 RX ADMIN — POLYETHYLENE GLYCOL 3350 17 GRAM(S): 17 POWDER, FOR SOLUTION ORAL at 12:24

## 2021-11-10 RX ADMIN — OXYCODONE HYDROCHLORIDE 10 MILLIGRAM(S): 5 TABLET ORAL at 07:09

## 2021-11-10 RX ADMIN — LIDOCAINE 1 PATCH: 4 CREAM TOPICAL at 03:00

## 2021-11-10 RX ADMIN — OXYCODONE HYDROCHLORIDE 10 MILLIGRAM(S): 5 TABLET ORAL at 12:21

## 2021-11-10 RX ADMIN — Medication 125 MICROGRAM(S): at 05:37

## 2021-11-10 RX ADMIN — OXYCODONE HYDROCHLORIDE 10 MILLIGRAM(S): 5 TABLET ORAL at 00:03

## 2021-11-10 RX ADMIN — PANTOPRAZOLE SODIUM 40 MILLIGRAM(S): 20 TABLET, DELAYED RELEASE ORAL at 07:08

## 2021-11-10 RX ADMIN — Medication 975 MILLIGRAM(S): at 05:37

## 2021-11-10 RX ADMIN — HEPARIN SODIUM 5000 UNIT(S): 5000 INJECTION INTRAVENOUS; SUBCUTANEOUS at 13:52

## 2021-11-10 RX ADMIN — LIDOCAINE 1 PATCH: 4 CREAM TOPICAL at 17:26

## 2021-11-10 RX ADMIN — OXYCODONE HYDROCHLORIDE 10 MILLIGRAM(S): 5 TABLET ORAL at 08:09

## 2021-11-10 RX ADMIN — OXYCODONE HYDROCHLORIDE 10 MILLIGRAM(S): 5 TABLET ORAL at 12:51

## 2021-11-10 RX ADMIN — Medication 975 MILLIGRAM(S): at 06:37

## 2021-11-10 RX ADMIN — GABAPENTIN 400 MILLIGRAM(S): 400 CAPSULE ORAL at 05:37

## 2021-11-10 RX ADMIN — LIDOCAINE 1 PATCH: 4 CREAM TOPICAL at 17:27

## 2021-11-10 RX ADMIN — Medication 975 MILLIGRAM(S): at 13:52

## 2021-11-10 RX ADMIN — GABAPENTIN 400 MILLIGRAM(S): 400 CAPSULE ORAL at 13:47

## 2021-11-10 RX ADMIN — Medication 1000 UNIT(S): at 12:22

## 2021-11-10 NOTE — PROGRESS NOTE ADULT - SUBJECTIVE AND OBJECTIVE BOX
PROGRESS NOTE:   Authored by Dawna Barber MD PGY-1  Pager [phone] [facility], [phone] [facility]   Please page night float  after 7PM    Patient is a 36y old  Female who presents with a chief complaint of Low back pain (09 Nov 2021 09:15)      SUBJECTIVE / OVERNIGHT EVENTS:    ADDITIONAL REVIEW OF SYSTEMS:    MEDICATIONS  (STANDING):  acetaminophen     Tablet .. 975 milliGRAM(s) Oral every 8 hours  cholecalciferol 1000 Unit(s) Oral daily  gabapentin 400 milliGRAM(s) Oral every 8 hours  heparin   Injectable 5000 Unit(s) SubCutaneous every 8 hours  influenza   Vaccine 0.5 milliLiter(s) IntraMuscular once  levothyroxine 125 MICROGram(s) Oral daily  lidocaine   4% Patch 1 Patch Transdermal every 24 hours  lidocaine   4% Patch 1 Patch Transdermal every 24 hours  pantoprazole    Tablet 40 milliGRAM(s) Oral before breakfast  polyethylene glycol 3350 17 Gram(s) Oral daily  senna 2 Tablet(s) Oral at bedtime    MEDICATIONS  (PRN):  ibuprofen  Tablet. 400 milliGRAM(s) Oral every 8 hours PRN Mild Pain (1 - 3), Moderate Pain (4 - 6), Severe Pain (7 - 10)  methocarbamol 500 milliGRAM(s) Oral every 12 hours PRN Muscle Spasm  oxyCODONE    IR 5 milliGRAM(s) Oral every 4 hours PRN Moderate Pain (4 - 6)  oxyCODONE    IR 10 milliGRAM(s) Oral every 4 hours PRN Severe Pain (7 - 10)      CAPILLARY BLOOD GLUCOSE        I&O's Summary    09 Nov 2021 07:01  -  10 Nov 2021 07:00  --------------------------------------------------------  IN: 300 mL / OUT: 0 mL / NET: 300 mL        PHYSICAL EXAM:  Vital Signs Last 24 Hrs  T(C): 36.9 (10 Nov 2021 05:35), Max: 36.9 (10 Nov 2021 05:35)  T(F): 98.4 (10 Nov 2021 05:35), Max: 98.4 (10 Nov 2021 05:35)  HR: 63 (10 Nov 2021 05:35) (63 - 71)  BP: 102/68 (10 Nov 2021 05:35) (102/68 - 164/64)  BP(mean): --  RR: 17 (10 Nov 2021 05:35) (17 - 18)  SpO2: 98% (10 Nov 2021 05:35) (98% - 98%)    CONSTITUTIONAL: NAD, well-developed  RESPIRATORY: Normal respiratory effort; lungs are clear to auscultation bilaterally  CARDIOVASCULAR: Regular rate and rhythm, normal S1 and S2, no murmur/rub/gallop; No lower extremity edema; Peripheral pulses are 2+ bilaterally  ABDOMEN: Nontender to palpation, normoactive bowel sounds, no rebound/guarding  MUSCULOSKELETAL: no clubbing or cyanosis of digits; no joint swelling or tenderness to palpation  PSYCH: A+O to person, place, and time; affect appropriate    LABS:                      RADIOLOGY & ADDITIONAL TESTS:  Results Reviewed:   Imaging Personally Reviewed:  Electrocardiogram Personally Reviewed:    COORDINATION OF CARE:  Care Discussed with Consultants/Other Providers [Y/N]:  Prior or Outpatient Records Reviewed [Y/N]:   PROGRESS NOTE:   Authored by Dawna Barber MD PGY-1  Pager [phone] [facility], [id number] [facility]   Please page night float  after 7PM    Patient is a 36y old  Female who presents with a chief complaint of Low back pain (09 Nov 2021 09:15)      SUBJECTIVE / OVERNIGHT EVENTS: No acute events overnight. Patient seen and examined this AM at bedside. Patient understands that her MRI did not have any acute findings and is conformable with the plan to be discharged today with close follow up. Patient was in pain this AM during exam, just received pain medications. No additional complaints at this time.     ADDITIONAL REVIEW OF SYSTEMS: negative     MEDICATIONS  (STANDING):  acetaminophen     Tablet .. 975 milliGRAM(s) Oral every 8 hours  cholecalciferol 1000 Unit(s) Oral daily  gabapentin 400 milliGRAM(s) Oral every 8 hours  heparin   Injectable 5000 Unit(s) SubCutaneous every 8 hours  influenza   Vaccine 0.5 milliLiter(s) IntraMuscular once  levothyroxine 125 MICROGram(s) Oral daily  lidocaine   4% Patch 1 Patch Transdermal every 24 hours  lidocaine   4% Patch 1 Patch Transdermal every 24 hours  pantoprazole    Tablet 40 milliGRAM(s) Oral before breakfast  polyethylene glycol 3350 17 Gram(s) Oral daily  senna 2 Tablet(s) Oral at bedtime    MEDICATIONS  (PRN):  ibuprofen  Tablet. 400 milliGRAM(s) Oral every 8 hours PRN Mild Pain (1 - 3), Moderate Pain (4 - 6), Severe Pain (7 - 10)  methocarbamol 500 milliGRAM(s) Oral every 12 hours PRN Muscle Spasm  oxyCODONE    IR 5 milliGRAM(s) Oral every 4 hours PRN Moderate Pain (4 - 6)  oxyCODONE    IR 10 milliGRAM(s) Oral every 4 hours PRN Severe Pain (7 - 10)      CAPILLARY BLOOD GLUCOSE        I&O's Summary    09 Nov 2021 07:01  -  10 Nov 2021 07:00  --------------------------------------------------------  IN: 300 mL / OUT: 0 mL / NET: 300 mL        PHYSICAL EXAM:  Vital Signs Last 24 Hrs  T(C): 36.9 (10 Nov 2021 05:35), Max: 36.9 (10 Nov 2021 05:35)  T(F): 98.4 (10 Nov 2021 05:35), Max: 98.4 (10 Nov 2021 05:35)  HR: 63 (10 Nov 2021 05:35) (63 - 71)  BP: 102/68 (10 Nov 2021 05:35) (102/68 - 164/64)  BP(mean): --  RR: 17 (10 Nov 2021 05:35) (17 - 18)  SpO2: 98% (10 Nov 2021 05:35) (98% - 98%)    CONSTITUTIONAL: NAD, well-developed  RESPIRATORY: Normal respiratory effort; lungs are clear to auscultation bilaterally  CARDIOVASCULAR: Regular rate and rhythm, normal S1 and S2, no murmur/rub/gallop; No lower extremity edema  ABDOMEN: Nontender to palpation, normoactive bowel sounds, no rebound/guarding; No hepatosplenomegaly  MUSCULOSKELETAL non-focal, 5/5 UE strength, unable to fully asses LE strength due to pain, sensation intact to light touch and temperature throughout  PSYCH: A+O to person, place, and time; affect appropriate    LABS:      RADIOLOGY & ADDITIONAL TESTS:  Results Reviewed:   Imaging Personally Reviewed:  Electrocardiogram Personally Reviewed:    COORDINATION OF CARE:  Care Discussed with Consultants/Other Providers [Y/N]: PM+R  Prior or Outpatient Records Reviewed [Y/N]:

## 2021-11-10 NOTE — PROGRESS NOTE ADULT - ASSESSMENT
35F with PMH spinal fusion x2 years ago and papillary thyroid cancer s/p hemithyroidectomy who presents with severe back pain. Patient pending MRI.  35F with PMH spinal fusion x2 years ago and papillary thyroid cancer s/p hemithyroidectomy who presents with severe back pain. MRI without acute findings, will discharge patient today with close follow up.

## 2021-11-10 NOTE — PROGRESS NOTE ADULT - PROVIDER SPECIALTY LIST ADULT
Rehab Medicine
Internal Medicine

## 2021-11-10 NOTE — DISCHARGE NOTE PROVIDER - NSDCCPCAREPLAN_GEN_ALL_CORE_FT
PRINCIPAL DISCHARGE DIAGNOSIS  Diagnosis: Back pain  Assessment and Plan of Treatment: Patient admitted to internal medicine for further management. For her low back pain, patient has a CT of the lumbar spine which showed no acute fractures or dislocations. She also had an MRI which showed multi-level degenerative changes with no acute findings. Patient was treated with tylenol for mild/moderate pain and oxycodone and Dilaudid for severe pain. Over the course of her hospitalization patient's need for pain medication slowly diminished but at the time of discharge she still required oxycodone intermittently for pain relief. Pain management was also consulted and patient's home Robaxin, lidocaine patch, and gabapentin were continued.       PRINCIPAL DISCHARGE DIAGNOSIS  Diagnosis: Back pain  Assessment and Plan of Treatment: You came into the hospital because you were having worsening back pain. You had a CT scan of your back which did not show any acute fractures or dislocations. You also had an MRI of your spine which showed multi-level degenerative changes but no acute findings. When you leave the hospital, please continue the pain regimen you were on in the hospital for pain control. Only take the oxycodone if your pain is severe.  After leaving the hospital, please follow up with your PCP and pain management doctor within one week. Please also establish care with our spine clinic for further management of your pain. If you experience decreased sensation in your legs or are unable to walk please return to the hospital for evaluation.

## 2021-11-10 NOTE — DISCHARGE NOTE NURSING/CASE MANAGEMENT/SOCIAL WORK - NSDCVIVACCINE_GEN_ALL_CORE_FT
influenza, injectable, quadrivalent, preservative free; 09-Dec-2019 12:20; Edie Mcmillan (RN); [employer]; [id number] (Exp. Date: 30-Jun-2020); IntraMuscular; Deltoid Left.; 0.5 milliLiter(s); VIS (VIS Published: 15-Aug-2019, VIS Presented: 09-Dec-2019);

## 2021-11-10 NOTE — DISCHARGE NOTE NURSING/CASE MANAGEMENT/SOCIAL WORK - NSDCPNINST_GEN_ALL_CORE
Pt alert and oriented, complained of B/L leg pain medication given as ordered relief noted, IV access removed per hospital policy, ready for discharge home.

## 2021-11-10 NOTE — DISCHARGE NOTE NURSING/CASE MANAGEMENT/SOCIAL WORK - PATIENT PORTAL LINK FT
You can access the FollowMyHealth Patient Portal offered by  by registering at the following website: [email or web address]. By joining [facility]’s FollowMyHealth portal, you will also be able to view your health information using other applications (apps) compatible with our system.

## 2021-11-10 NOTE — DISCHARGE NOTE PROVIDER - CARE PROVIDER_API CALL
Pradeep Hui  PHYSICAL/REHAB MEDICINE  [address] [place] [address], [address]  [place] [address]  Phone: [phone]  Fax: [phone]  Follow Up Time: 1 week

## 2021-11-10 NOTE — DISCHARGE NOTE PROVIDER - NSDCFUADDAPPT_GEN_ALL_CORE_FT
Please follow up with Dr. Cali on Friday 11/12 at 8am at [address] [facility] [address]

## 2021-11-10 NOTE — PROGRESS NOTE ADULT - PROBLEM SELECTOR PLAN 1
Patient with hx of L4-L5 transforaminal lumbar interbody fusion on 9/20/2018, now with acute atraumatic low back pain.   - Pain control with Tylenol and oral oxycodone, IV Dilaudid d/c'd 11/9   - Continue Robaxin to 1000 mg PO every 6 hours PRN  - Gabapentin 400 mg every 8 hours  - Lidocaine patch to hips q12h on/12 h off  - Pending MRI, consider NSGY consult  - PM+R consult for TENS/ acute rehab evaluation/ possible referral to spine clinic Patient with hx of L4-L5 transforaminal lumbar interbody fusion on 9/20/2018, now with acute atraumatic low back pain.   - Pain control with Tylenol and oral oxycodone, IV Dilaudid d/c'd 11/9   - Continue Robaxin to 1000 mg PO every 6 hours PRN  - Gabapentin 400 mg every 8 hours  - Lidocaine patch to hips q12h on/12 h off  - MRI with multi-level degenerative changes, no acute findings  - PM+R following, patient will follow up with her pain management doctor and the spine clinic as an outpatient for further management

## 2021-11-10 NOTE — DISCHARGE NOTE PROVIDER - NSDCMRMEDTOKEN_GEN_ALL_CORE_FT
Advil Dual Action With Acetaminophen 250 mg-125 mg oral tablet: 1 tab(s) orally 2 times a day as needed  gabapentin 300 mg oral capsule: 1 cap(s) orally 3 times a day  ibuprofen 100 mg oral tablet: 1 tab(s) orally every 12 hours  methocarbamol 500 mg oral tablet: 1 tab(s) orally 2 times a day  Synthroid 125 mcg (0.125 mg) oral tablet: 1 tab(s) orally once a day   Advil Dual Action With Acetaminophen 250 mg-125 mg oral tablet: 1 tab(s) orally 2 times a day as needed  gabapentin 300 mg oral capsule: 1 cap(s) orally 3 times a day  ibuprofen 100 mg oral tablet: 1 tab(s) orally every 12 hours  lidocaine 4% topical film: Apply topically to affected area 2 times a day, As Needed for pain    please pick this up over the counter at your pharmacy   methocarbamol 500 mg oral tablet: 1 tab(s) orally 2 times a day  oxyCODONE 10 mg oral tablet: 1 tab(s) orally every 6 hours, As Needed -Severe Pain (7 - 10) MDD:40mg  Synthroid 125 mcg (0.125 mg) oral tablet: 1 tab(s) orally once a day   Advil Dual Action With Acetaminophen 250 mg-125 mg oral tablet: 1 tab(s) orally 2 times a day as needed  gabapentin 300 mg oral capsule: 1 cap(s) orally 3 times a day  ibuprofen 100 mg oral tablet: 1 tab(s) orally every 12 hours  lidocaine 4% topical film: Apply topically to affected area 2 times a day, As Needed for pain    please pick this up over the counter at your pharmacy   methocarbamol 500 mg oral tablet: 1 tab(s) orally 2 times a day  oxyCODONE 10 mg oral tablet: 1 tab(s) orally every 4 hours, As Needed -Severe Pain (7 - 10) MDD:40mg   Synthroid 125 mcg (0.125 mg) oral tablet: 1 tab(s) orally once a day

## 2021-11-10 NOTE — PROGRESS NOTE ADULT - ATTENDING COMMENTS
37 y/o F hx Thyroid cancer s/p hemithyroidectomy, chronic LBP 2/2 herniated disc s/p L4/L5 fusion in 2018 and laminectomy, who presents with acute on chronic low back pain. Patient found to have Redemonstration of L4-L5 posterior fusion, laminectomy, and interbody fusion graft placement and no acute fractures or dislocations on CT Lumbar spine.  Pt c/o  pain moderately controlled.    - Pain management consult appreciated, Recs noted   - MRI pending.       Dispo;   pending pain control.  PT recommends home PT
37 y/o F hx Thyroid cancer s/p hemithyroidectomy, chronic LBP 2/2 herniated disc s/p L4/L5 fusion in 2018 and laminectomy, who presents with acute on chronic low back pain. Patient found to have Redemonstration of L4-L5 posterior fusion, laminectomy, and interbody fusion graft placement and no acute fractures or dislocations on CT Lumbar spine.  Pt c/o  pain moderately controlled, Pt says steroid has helped her in the past , pt got one dose of prednisone 50 mg in ED   - Pain management consult appreciated, Recs noted   - MRI pending.       Dispo;   pending pain control.  PT recommends home PT
Patient seen and examined by myself , case discussed  with resident ,agree with the above finding and plan  37 y/o F hx Thyroid cancer s/p hemithyroidectomy, chronic LBP 2/2 herniated disc s/p L4/L5 fusion in 2018 and laminectomy, who presents with acute on chronic low back pain. Patient found to have Redemonstration of L4-L5 posterior fusion, laminectomy, and interbody fusion graft placement and no acute fractures or dislocations on CT Lumbar spine.  Pt c/o worse pain at this time as she walked to the bathroom, Pt says steroid has helped her in the past , pt got one dose of prednisone 50 mg in ED   - Pain management consult appreciated, Recs noted   - Pain control with standing tylenol, gabapentin, and percocet  for severe pain methocarbamol PRN for muscle spasm   - will give a trial of Solumedrol 40 mg IV x 1 dose, if patient reports improvement , will start tapering prednisone   - PT recommends home PT   -  MRI  was deferred as CT unchanged and unlikely plan for surgical intervention. Pain management recommending neurosx eval, will check MRI first   #Thyroid cancer s/p hemithyroidectomy: Continue synthroid       Dispo;   pending pain control. pfizer booster on dc  plan of care was d/w patient
Patient seen and examined by myself , case discussed  with resident ,agree with the above finding and plan  37 y/o F hx Thyroid cancer s/p hemithyroidectomy, chronic LBP 2/2 herniated disc s/p L4/L5 fusion in 2018 and laminectomy, who presents with acute on chronic low back pain. Patient found to have Redemonstration of L4-L5 posterior fusion, laminectomy, and interbody fusion graft placement and no acute fractures or dislocations on CT Lumbar spine.  Pt c/o  pain moderately controlled, Pt says steroid has helped her in the past , pt got one dose of prednisone 50 mg in ED   - Pain management consult appreciated, Recs noted   - Pain control with standing tylenol, gabapentin, and percocet  for severe pain methocarbamol PRN for muscle spasm   - s/p  trial of Solumedrol 40 mg IV x 1 dose, pt does not report improvement with solumedrol, will hold off on tapering prednisone at this time   - PT recommends home PT   -  MRI  was deferred as CT unchanged and unlikely plan for surgical intervention. Pain management recommending neurosx eval, will check MRI first   #Thyroid cancer s/p hemithyroidectomy: Continue synthroid       Dispo;   pending pain control. pfizer booster on dc  plan of care was d/w patient
Pt seen/examined at bedside.  MRI report reviewed, no acute changes.  Plan for DC today, will provide follow-up with [facility] [place].  Discussed at bedside with patient extensively, DC time spent 38 minutes
Patient seen and examined at bedside. In brief, 35 y/o F hx Thyroid cancer s/p hemithyroidectomy, chronic LBP 2/2 herniated disc s/p L4/L5 fusion in 2018 and laminectomy, who presents with acute on chronic low back pain. Patient found to have Redemonstration of L4-L5 posterior fusion, laminectomy, and interbody fusion graft placement and no acute fractures or dislocations on CT Lumbar spine.   - Pain management consult  - Pain control with standing tylenol, gabapentin, and percocet  for severe pain methocarbmol PRN for muscle spasm   - PT recommends home PT   - Defer MRI as CT unchanged and unlikely plan for surgical intervention. Will continue conservative management with pain control and PT.   #Thyroid cancer s/p hemithyroidectomy: Continue synthroid       Dispo; 11/7 or 11/8  pending pain control. pfizer booster on dc

## 2021-11-10 NOTE — PROGRESS NOTE ADULT - SUBJECTIVE AND OBJECTIVE BOX
Patient is a 36y old  Female who presents with a chief complaint of Low back pain (10 Nov 2021 07:22)      HPI:  Patient is a 37 y/o F hx thryoid cancer s/p hemithyroidectomy, chronic LBP 2/2 herniated disc s/p L4/L5 fusion 2018, who presents with acute low back pain. She was bending forward today and felt a snap and had acute low back pain after hearing a pop. She generally has radiculopathy towards the left posterior thigh, but now it is towards the right posterior thigh as well. She states this is worse than usual, and had difficulty with ambulation, though with assistance or a walker was able to get around. She was able to take the bus to the ED. She follows outpt pain specialist due to chronic LBP, and receives epidural injections for it. Takes ibuprofen, advil-acetaminophen, gabapentin, and methocarbamol. She denies dysuria, urinary incontinence, or decreased sensation around groin or anal area. She denies fevers, chills, nausea, vomiting, diarrhea, sob, cp, visual changes, headaches, or skin rashes.     ED vitals: afebrile, HR 74, /67, RR 18 99% RA  xray lumbar without acute findings  CT lumbar performed    	 (05 Nov 2021 00:02)      Patient reports low back pain, radiating down b/l legs, R worse than L.  She received epidural injection 2-3 weeks ago, which was her 3rd within the year.  She reports injections were helpful but feels pain returned.    Pain currently 8/10, sharp, with some associated numbness, with spasm L foot  Reports bm today.    REVIEW OF SYSTEMS  Constitutional - No fever, No weight loss, No fatigue  HEENT - No eye pain, No visual disturbances, No difficulty hearing, No tinnitus, No vertigo, No neck pain  Respiratory - No cough, No wheezing, No shortness of breath  Cardiovascular - No chest pain, No palpitations  Gastrointestinal - No abdominal pain, No nausea, No vomiting, No diarrhea, No constipation  Genitourinary - No dysuria, No frequency, No hematuria, No incontinence  Neurological - No headaches, No memory loss, + loss of strength, No numbness, No tremors  Skin - No itching, No rashes, No lesions   Endocrine - No temperature intolerance  Musculoskeletal - + pain  Psychiatric - No depression, No anxiety    PAST MEDICAL & SURGICAL HISTORY     Herniated nucleus pulposus, L4-5    Thyroid cancer    Irregular menstruation    Acid reflux    Lumbar herniated disc    Paresthesia of right leg      H/O microdiscectomy    S/P partial thyroidectomy      SOCIAL HISTORY  Smoking - Denied  EtOH - social  Drugs - Denied    FUNCTIONAL HISTORY  Lives with aunt and mom in house with stairs to basement  Independent at baseline, has a cane but doesn't use it    CURRENT FUNCTIONAL STATUS  11/8   Bed Mobility  Bed Mobility Training Supine-to-Sit: independent    Sit-Stand Transfer Training  Transfer Training Sit-to-Stand Transfer: independent  Transfer Training Stand-to-Sit Transfer: independent    Gait Training  Gait Training: independent;  200 feet    Stair Training  Physical Assist/Nonphysical Assist: Independent   Assistive Device: Did not use railings, though available  Number of Stairs: 4           FAMILY HISTORY   No pertinent family history in first degree relatives    No pertinent family history in first degree relatives    Family history of thyroid cancer        RECENT LABS/IMAGING    < from: MR Lumbar Spine w/wo IV Cont (11.09.21 @ 15:16) >  EXAM:  MR SPINE LUMBAR WAW IC        PROCEDURE DATE:  Nov 9 2021         INTERPRETATION:  .    CLINICAL INFORMATION: Chronic back pain with acutely worsening right-sided pain.    TECHNIQUE: Multiplanar multi-sequential MR examination of the lumbarspine was acquired with and without the administration of IV gadolinium. 9 cc of IV Gadavist was administered without immediate complications. 1 cc were discarded.    COMPARISON: Prior noncontrast MRI study of the lumbar spine dated 8/11/2021. Prior CT examination of the lumbar spine from 11/4/2021.    FINDINGS: Again seen is that the patient is status post L4-L5 posterior fusion, laminectomy, and placement of an interbody fusion graft. The orthopedic hardware generates susceptibility artifact, limiting evaluation of the surrounding structures.    No acute fractures or dislocations are seen. The lumbar alignment is maintained. There is no loss of vertebral body height. No aggressive marrow signal change is appreciated. The conus medullaris appears normal in signal and morphology and terminates at the L1 level. There is no abnormal intrathecal enhancement.    The findings at the individual disc space levels are as follows:    T12-L1:Normal.    L1-L2: Normal.    L2-L3: There is disc space desiccation with minor circumferential loss of height. A bulging disc is seen with a superimposed shallow disc protrusion spanning the bilateral paracentral zones of canal. There is no nerve root contact. There is no spinal canal or foraminal narrowing.    L3-L4: There is disc space desiccation with minimal circumferential loss of height. A bulging disc is seen which deforms the ventral thecal sac and contacts the descending nerve roots bilaterally. Facet arthrosis and ligament flavum redundancy are seen. There is overall mild canal stenosis. Mild bilateral foraminal narrowing is also seen.    L4-L5: An interbody fusion graft is seen. A posterior endplate osteophyte projects into the canal with a superimposed small central disc protrusion no nerve root contact is seen. There is no canal or right foraminal narrowing. There is mild left foraminal stenosis.    L5-S1: Disc space signal is normal. There is mild loss of height posteriorly. There is no spinal canal or foraminal stenosis.    IMPRESSION: Redemonstration of L4-L5 posterior fusion, laminectomy, and interbody fusion graft placement.    No acute findings.    Multilevel degenerative changes, as outlined.    --- End of Report ---          < end of copied text >    CBC Full  -  ( 10 Nov 2021 08:02 )  WBC Count : 8.09 K/uL  RBC Count : 4.04 M/uL  Hemoglobin : 11.4 g/dL  Hematocrit : 36.5 %  Platelet Count - Automated : 253 K/uL  Mean Cell Volume : 90.3 fL  Mean Cell Hemoglobin : 28.2 pg  Mean Cell Hemoglobin Concentration : 31.2 gm/dL  Auto Neutrophil # : 3.58 K/uL  Auto Lymphocyte # : 3.85 K/uL  Auto Monocyte # : 0.50 K/uL  Auto Eosinophil # : 0.09 K/uL  Auto Basophil # : 0.03 K/uL  Auto Neutrophil % : 44.2 %  Auto Lymphocyte % : 47.6 %  Auto Monocyte % : 6.2 %  Auto Eosinophil % : 1.1 %  Auto Basophil % : 0.4 %    11-10    137  |  101  |  12  ----------------------------<  87  4.2   |  28  |  0.71    Ca    9.1      10 Nov 2021 08:02  Phos  4.2     11-10  Mg     1.90     11-10    TPro  6.4  /  Alb  3.6  /  TBili  0.2  /  DBili  x   /  AST  21  /  ALT  29  /  AlkPhos  43  11-10        VITALS  T(C): 36.9 (11-10-21 @ 05:35), Max: 36.9 (11-10-21 @ 05:35)  HR: 63 (11-10-21 @ 05:35) (63 - 71)  BP: 102/68 (11-10-21 @ 05:35) (102/68 - 164/64)  RR: 17 (11-10-21 @ 05:35) (17 - 18)  SpO2: 98% (11-10-21 @ 05:35) (98% - 98%)  Wt(kg): --    ALLERGIES  tramadol (Pruritus)  Voltaren (Flushing; Urticaria; Rash; Swelling)  Voltaren (Pruritus; Hives)      MEDICATIONS   acetaminophen     Tablet .. 975 milliGRAM(s) Oral every 8 hours  cholecalciferol 1000 Unit(s) Oral daily  gabapentin 400 milliGRAM(s) Oral every 8 hours  heparin   Injectable 5000 Unit(s) SubCutaneous every 8 hours  ibuprofen  Tablet. 400 milliGRAM(s) Oral every 8 hours PRN  influenza   Vaccine 0.5 milliLiter(s) IntraMuscular once  levothyroxine 125 MICROGram(s) Oral daily  lidocaine   4% Patch 1 Patch Transdermal every 24 hours  lidocaine   4% Patch 1 Patch Transdermal every 24 hours  methocarbamol 500 milliGRAM(s) Oral every 12 hours PRN  oxyCODONE    IR 5 milliGRAM(s) Oral every 4 hours PRN  oxyCODONE    IR 10 milliGRAM(s) Oral every 4 hours PRN  pantoprazole    Tablet 40 milliGRAM(s) Oral before breakfast  polyethylene glycol 3350 17 Gram(s) Oral daily  senna 2 Tablet(s) Oral at bedtime      ----------------------------------------------------------------------------------------  PHYSICAL EXAM -   Constitutional - NAD, Comfortable  + slump test b/l   HEENT - NCAT, EOMI  Neck - Supple, No limited ROM  Chest - no respiratory distress  Cardiovascular - RRR, S1S2   Abdomen -  Soft, NTND  Extremities - No C/C/E, No calf tenderness   Neurologic Exam -                    Cognitive - Awake, Alert, AAO to self, place, date, year, situation     Communication - Fluent, No dysarthria     Cranial Nerves - CN 2-12 intact     Motor -                      LEFT    UE - ShAB 5/5, EF 5/5, EE 5/5, WE 5/5,  5/5                    RIGHT UE - ShAB 5/5, EF 5/5, EE 5/5, WE 5/5,  5/5                    LEFT    LE - HF 4/5, KE 4+/5, DF 5/5, PF 5/5                    RIGHT LE - HF 4/5, KE 4+/5, DF 5/5, PF 5/5        Sensory - Intact to LT     Reflexes - DTR Intact     OculoVestibular - No saccades, No nystagmus, VOR         Balance - WNL Static  Psychiatric - Mood stable, Affect WNL  ----------------------------------------------------------------------------------------  ASSESSMENT/PLAN 35 yo f p/w worsening radicular low back pain  MRIs complete  Pain -oxy ir prn, ibuprofen prn, lidocaine patch, robaxin, bowel regimen  Diet- dash/tlc,   DVT PPX - heparin   ambulated with bedside therapy, 200' with supervision and climbed stairs  Rehab - Recommend home with outpatient PT when medically cleared.        follow up with Dr. Pradeep Cobos, her pain management doctor and/or outpatient [facility]

## 2021-11-10 NOTE — PROGRESS NOTE ADULT - PROBLEM SELECTOR PLAN 4
Diet: regular   DVT prophylaxis: subcutaneous heparin   Dispo: PT recommending outpatient PT, pending PM+R consult Diet: regular   DVT prophylaxis: subcutaneous heparin   Dispo: home, no PT needs

## 2021-11-10 NOTE — DISCHARGE NOTE PROVIDER - HOSPITAL COURSE
Patient is a 35 y/o F hx thyroid cancer s/p hemithyroidectomy, chronic LBP 2/2 herniated disc s/p L4/L5 fusion 2018, who presents with acute low back pain. She was bending forward on the day of admission and felt a snap and had acute low back pain after hearing a pop. This back pain was in a different distribution than her chronic radiculopathy. She follows outpatient pain specialist due to chronic LBP, and receives epidural injections for it. Takes ibuprofen, advil, acetaminophen, gabapentin, and methocarbamol.     Patient admitted to internal medicine for further management. For her low back pain, patient has a CT of the lumbar spine which showed no acute fractures or dislocations. She also had an MRI which showed multi-level degenerative changes with no acute findings. Patient was treated with tylenol for mild/moderate pain and oxycodone and Dilaudid for severe pain. Over the course of her hospitalization patient's need for pain medication slowly diminished but at the time of discharge she still required oxycodone intermittently for pain relief. Pain management was also consulted and patient's home Robaxin, lidocaine patch, and gabapentin were continued.     Patient is now stable for discharge with close follow up with her PCP, her outpatient pain management specialist, and a referral to our spine center. Patient was evaluated by PT and has no PT needs at this time. Patient is able to ambulate independently

## 2021-11-10 NOTE — DISCHARGE NOTE NURSING/CASE MANAGEMENT/SOCIAL WORK - NSDCFUADDAPPT_GEN_ALL_CORE_FT
Please follow up with Dr. Cali on Friday 11/12 at 8am at [address] [facility] [address]

## 2021-11-11 ENCOUNTER — NON-APPOINTMENT (OUTPATIENT)
Age: 36
End: 2021-11-11

## 2021-11-12 ENCOUNTER — APPOINTMENT (OUTPATIENT)
Dept: ORTHOPEDIC SURGERY | Facility: CLINIC | Age: 36
End: 2021-11-12
Payer: MEDICAID

## 2021-11-12 VITALS
SYSTOLIC BLOOD PRESSURE: 100 MMHG | BODY MASS INDEX: 32.82 KG/M2 | DIASTOLIC BLOOD PRESSURE: 68 MMHG | HEIGHT: 65 IN | WEIGHT: 197 LBS | HEART RATE: 74 BPM

## 2021-11-12 DIAGNOSIS — M54.16 RADICULOPATHY, LUMBAR REGION: ICD-10-CM

## 2021-11-12 DIAGNOSIS — M47.816 SPONDYLOSIS W/OUT MYELOPATHY OR RADICULOPATHY, LUMBAR REGION: ICD-10-CM

## 2021-11-12 PROCEDURE — 99205 OFFICE O/P NEW HI 60 MIN: CPT

## 2021-11-12 RX ORDER — IBUPROFEN 800 MG/1
800 TABLET, FILM COATED ORAL
Qty: 90 | Refills: 0 | Status: ACTIVE | COMMUNITY
Start: 2021-11-12 | End: 1900-01-01

## 2021-11-12 NOTE — HISTORY OF PRESENT ILLNESS
[de-identified] : This 36-year-old woman has been under the care of Dr. Star Wooten for several years.  She started with back symptoms in 2015 and in November 2016 had a lumbar laminectomy and discectomy at the L4-5 level.  Preoperatively her back pain was graded as an 8 or a 9 in the left leg pain is a 7.  The back symptoms decreased to the level of a 234 as did the leg pain but she was never fully better.  In 2017 the symptoms increased dramatically with both the back and leg pain graded as a 10 and she had a much larger recurrent disc herniation at the L4-5 level.  She eventually underwent a repeat laminectomy and an instrumented fusion at the L4-5 level.  She has had ongoing significant symptoms of back and leg pain.  She recently had 3 epidural steroid injections without significant relief.  She is on metacarbinol and gabapentin and has been taking ibuprofen 600 mg 3 times a day reportedly but is likely only getting it twice a day at most.  She recently started on oxycodone.  Her pain is worse with sitting and driving and slightly worse standing and walking.  She has some numbness and tingling in the left leg.  There is a positive Valsalva effect.  She has night pain.\par \par Besides her 2 back operations she has had thyroid surgery.  She is on Synthroid.  She gets reflux symptoms 2 or 3 times a week.  She had an allergic reaction to diclofenac in the past.  She smokes on occasion.  She works as a home health aide. [Pain Location] : pain [Worsening] : worsening [10] : a maximum pain level of 10/10 [Walking] : walking [Sitting] : sitting [Standing] : standing

## 2021-11-12 NOTE — DISCUSSION/SUMMARY
[Medication Risks Reviewed] : Medication risks reviewed [de-identified] : She has a chronic pain syndrome and a failed back syndrome.  Schmorl's nodes in the lumbar spine suggest she had Scheuermann's disease as an adolescent that may have predisposed her to this.  There are minor disc bulges at L2-3 and L3-4.  She has failed 3 epidural steroid injections.  There is no paravertebral muscle spasm in the exam so not sure benefit she gets from metacarbinol.  Recently she has started on oxycodone which is a bad sign.  I have increased her ibuprofen to 800 mg 3 times a day along with omeprazole for GI protection.  I have told her that I will not take over her care after she has had 2 prior surgeries by different spine surgeon.  She will follow up with Dr. Wooten in 4 weeks.

## 2021-11-12 NOTE — PHYSICAL EXAM
[de-identified] : She seems fully alert and oriented and in no acute distress as I take the history.  She was she ambulates she has complaints of pain.  She ambulates with a slow and guarded gait but can tiptoe and heel walk with assistance.  Cutaneous examination of the spine reveals a well-healed midline incision in the lower.  There is no evidence of shortness of breath or respiratory distress.  There is no paravertebral muscle spasm.  There is 1-2+ sciatic notch tenderness on the right and the left.  There is mild trochanteric tenderness on the right but none on the left.  There is some mild suppression of left-sided reflexes.  Forward flexion of the spine is markedly limited to 60 degrees and straight leg raising is negative at 80 degrees but pain at 90 degrees.  Vascular examination shows no evidence of varicosities and there is no lymphedema.  There are no cutaneous abnormalities of the upper or lower extremities.  Her elbows have a full and painless range of motion with normal motor power and normal stability. [de-identified] : I reviewed approximately 15 imaging studies of the spine.  I reviewed a cervical spine MRI from October 2016.  There are some minor disc bulges.\par \par I reviewed an MRI of the thoracic spine from October 2016 that seemed reasonably normal with some minor disc bulges.\par \par I reviewed an MRI of the lumbar spine from October 2016 that revealed Schmorl's nodes with a large left-sided disc herniation at L4-5.\par \par I reviewed a subsequent MRI of the lumbar spine after her surgery that revealed an even larger recurrent disc herniation on the left side with some endplate changes of edema in the adjacent vertebral bodies.\par \par I reviewed plain x-rays of the lumbar spine from August 2017 that revealed some disc space narrowing at L4-5.\par \par I reviewed an MRI of the lumbar spine from August 2017 that revealed further disc space narrowing and endplate changes at the L4-5 level.\par \par I reviewed postoperative x-ray changes after her fusion with a spinal instrumentation that was intact in October 2018.\par \par I reviewed MRIs of the lumbar spine from July and December 2019.\par \par I reviewed CAT scans of the lumbar spine from December 2019 and November 2020 that show the hardware to be intact without evidence of lucency around the screws that would suggest a failure of the fusion.  Note is difficult to assess the fusion.\par \par I reviewed an MRI of the lumbar spine from November of that year and now there is some disc desiccation at L2-3.\par \par I reviewed plain x-rays of the lumbar spine from August 2021 and MRI from August 2021 and now there is some disc desiccation at L2-3 and L3-4.  I reviewed an MRI from November of this year that looks much the same.

## 2021-11-26 ENCOUNTER — RX RENEWAL (OUTPATIENT)
Age: 36
End: 2021-11-26

## 2021-12-08 ENCOUNTER — RESULT REVIEW (OUTPATIENT)
Age: 36
End: 2021-12-08

## 2021-12-08 ENCOUNTER — APPOINTMENT (OUTPATIENT)
Dept: MRI IMAGING | Facility: CLINIC | Age: 36
End: 2021-12-08
Payer: MEDICAID

## 2021-12-08 ENCOUNTER — OUTPATIENT (OUTPATIENT)
Dept: OUTPATIENT SERVICES | Facility: HOSPITAL | Age: 36
LOS: 1 days | End: 2021-12-08

## 2021-12-08 DIAGNOSIS — E89.0 POSTPROCEDURAL HYPOTHYROIDISM: Chronic | ICD-10-CM

## 2021-12-08 DIAGNOSIS — Z98.890 OTHER SPECIFIED POSTPROCEDURAL STATES: Chronic | ICD-10-CM

## 2021-12-08 PROCEDURE — 72148 MRI LUMBAR SPINE W/O DYE: CPT | Mod: 26

## 2022-01-05 NOTE — H&P PST ADULT - NEGATIVE NEUROLOGICAL SYMPTOMS
Detail Level: Detailed
no syncope/no tremors/no confusion/no loss of sensation/no headache/no paresthesias/no vertigo/no hemiparesis/no focal seizures/no transient paralysis/no facial palsy/no generalized seizures/no loss of consciousness/no weakness/no difficulty walking

## 2022-02-24 ENCOUNTER — RX RENEWAL (OUTPATIENT)
Age: 37
End: 2022-02-24

## 2022-04-23 NOTE — H&P PST ADULT - PROBLEM/PLAN-2
I will START or STAY ON the medications listed below when I get home from the hospital:    acetaminophen 325 mg oral tablet  -- 3 tab(s) by mouth every 6 hours  -- Indication: For Pain    ibuprofen 600 mg oral tablet  -- 1 tab(s) by mouth every 6 hours  -- Indication: For Pain   DISPLAY PLAN FREE TEXT

## 2022-11-02 ENCOUNTER — APPOINTMENT (OUTPATIENT)
Dept: SPINE | Facility: CLINIC | Age: 37
End: 2022-11-02

## 2022-11-02 VITALS
DIASTOLIC BLOOD PRESSURE: 68 MMHG | OXYGEN SATURATION: 100 % | SYSTOLIC BLOOD PRESSURE: 99 MMHG | HEIGHT: 65 IN | HEART RATE: 69 BPM | WEIGHT: 200 LBS | BODY MASS INDEX: 33.32 KG/M2

## 2022-11-02 DIAGNOSIS — M51.36 OTHER INTERVERTEBRAL DISC DEGENERATION, LUMBAR REGION: ICD-10-CM

## 2022-11-02 PROCEDURE — 99214 OFFICE O/P EST MOD 30 MIN: CPT

## 2022-11-02 RX ORDER — GABAPENTIN 100 MG/1
100 CAPSULE ORAL EVERY 8 HOURS
Qty: 90 | Refills: 1 | Status: DISCONTINUED | COMMUNITY
Start: 2021-01-27 | End: 2022-11-02

## 2022-11-02 RX ORDER — OMEPRAZOLE 20 MG/1
20 CAPSULE, DELAYED RELEASE ORAL DAILY
Qty: 30 | Refills: 1 | Status: DISCONTINUED | COMMUNITY
Start: 2021-11-12 | End: 2022-11-02

## 2022-11-10 ENCOUNTER — APPOINTMENT (OUTPATIENT)
Dept: MRI IMAGING | Facility: CLINIC | Age: 37
End: 2022-11-10

## 2022-11-14 ENCOUNTER — RESULT REVIEW (OUTPATIENT)
Age: 37
End: 2022-11-14

## 2022-11-14 ENCOUNTER — OUTPATIENT (OUTPATIENT)
Dept: OUTPATIENT SERVICES | Facility: HOSPITAL | Age: 37
LOS: 1 days | End: 2022-11-14

## 2022-11-14 ENCOUNTER — APPOINTMENT (OUTPATIENT)
Dept: MRI IMAGING | Facility: CLINIC | Age: 37
End: 2022-11-14

## 2022-11-14 DIAGNOSIS — E89.0 POSTPROCEDURAL HYPOTHYROIDISM: Chronic | ICD-10-CM

## 2022-11-14 DIAGNOSIS — Z98.890 OTHER SPECIFIED POSTPROCEDURAL STATES: Chronic | ICD-10-CM

## 2022-11-14 PROCEDURE — 72148 MRI LUMBAR SPINE W/O DYE: CPT | Mod: 26

## 2022-12-08 ENCOUNTER — OUTPATIENT (OUTPATIENT)
Dept: OUTPATIENT SERVICES | Facility: HOSPITAL | Age: 37
LOS: 1 days | End: 2022-12-08

## 2022-12-08 ENCOUNTER — APPOINTMENT (OUTPATIENT)
Dept: MRI IMAGING | Facility: CLINIC | Age: 37
End: 2022-12-08

## 2022-12-08 DIAGNOSIS — Z98.890 OTHER SPECIFIED POSTPROCEDURAL STATES: Chronic | ICD-10-CM

## 2022-12-08 DIAGNOSIS — E89.0 POSTPROCEDURAL HYPOTHYROIDISM: Chronic | ICD-10-CM

## 2022-12-08 PROCEDURE — 72146 MRI CHEST SPINE W/O DYE: CPT | Mod: 26

## 2022-12-14 NOTE — ED ADULT NURSE NOTE - BREATH SOUNDS, RIGHT
clear Glycopyrrolate Counseling:  I discussed with the patient the risks of glycopyrrolate including but not limited to skin rash, drowsiness, dry mouth, difficulty urinating, and blurred vision.

## 2023-01-25 ENCOUNTER — APPOINTMENT (OUTPATIENT)
Dept: SPINE | Facility: CLINIC | Age: 38
End: 2023-01-25
Payer: MEDICAID

## 2023-01-25 VITALS
DIASTOLIC BLOOD PRESSURE: 78 MMHG | BODY MASS INDEX: 33.32 KG/M2 | OXYGEN SATURATION: 98 % | SYSTOLIC BLOOD PRESSURE: 109 MMHG | HEIGHT: 65 IN | WEIGHT: 200 LBS | HEART RATE: 72 BPM

## 2023-01-25 DIAGNOSIS — M79.606 PAIN IN LEG, UNSPECIFIED: ICD-10-CM

## 2023-01-25 DIAGNOSIS — G89.4 CHRONIC PAIN SYNDROME: ICD-10-CM

## 2023-01-25 DIAGNOSIS — G89.29 PAIN IN LEG, UNSPECIFIED: ICD-10-CM

## 2023-01-25 PROCEDURE — 99213 OFFICE O/P EST LOW 20 MIN: CPT

## 2023-03-07 ENCOUNTER — OUTPATIENT (OUTPATIENT)
Dept: OUTPATIENT SERVICES | Facility: HOSPITAL | Age: 38
LOS: 1 days | End: 2023-03-07
Payer: MEDICAID

## 2023-03-07 VITALS
OXYGEN SATURATION: 98 % | TEMPERATURE: 99 F | HEIGHT: 65 IN | SYSTOLIC BLOOD PRESSURE: 97 MMHG | WEIGHT: 209 LBS | HEART RATE: 67 BPM | RESPIRATION RATE: 16 BRPM | DIASTOLIC BLOOD PRESSURE: 67 MMHG

## 2023-03-07 DIAGNOSIS — Z98.890 OTHER SPECIFIED POSTPROCEDURAL STATES: Chronic | ICD-10-CM

## 2023-03-07 DIAGNOSIS — M54.42 LUMBAGO WITH SCIATICA, LEFT SIDE: ICD-10-CM

## 2023-03-07 DIAGNOSIS — E89.0 POSTPROCEDURAL HYPOTHYROIDISM: Chronic | ICD-10-CM

## 2023-03-07 DIAGNOSIS — Z98.1 ARTHRODESIS STATUS: Chronic | ICD-10-CM

## 2023-03-07 DIAGNOSIS — Z01.818 ENCOUNTER FOR OTHER PREPROCEDURAL EXAMINATION: ICD-10-CM

## 2023-03-07 LAB
ANION GAP SERPL CALC-SCNC: 11 MMOL/L — SIGNIFICANT CHANGE UP (ref 5–17)
BUN SERPL-MCNC: 17 MG/DL — SIGNIFICANT CHANGE UP (ref 7–23)
CALCIUM SERPL-MCNC: 8.8 MG/DL — SIGNIFICANT CHANGE UP (ref 8.4–10.5)
CHLORIDE SERPL-SCNC: 103 MMOL/L — SIGNIFICANT CHANGE UP (ref 96–108)
CO2 SERPL-SCNC: 24 MMOL/L — SIGNIFICANT CHANGE UP (ref 22–31)
CREAT SERPL-MCNC: 0.6 MG/DL — SIGNIFICANT CHANGE UP (ref 0.5–1.3)
EGFR: 120 ML/MIN/1.73M2 — SIGNIFICANT CHANGE UP
GLUCOSE SERPL-MCNC: 80 MG/DL — SIGNIFICANT CHANGE UP (ref 70–99)
HCT VFR BLD CALC: 39.2 % — SIGNIFICANT CHANGE UP (ref 34.5–45)
HGB BLD-MCNC: 11.9 G/DL — SIGNIFICANT CHANGE UP (ref 11.5–15.5)
MCHC RBC-ENTMCNC: 27.9 PG — SIGNIFICANT CHANGE UP (ref 27–34)
MCHC RBC-ENTMCNC: 30.4 GM/DL — LOW (ref 32–36)
MCV RBC AUTO: 91.8 FL — SIGNIFICANT CHANGE UP (ref 80–100)
NRBC # BLD: 0 /100 WBCS — SIGNIFICANT CHANGE UP (ref 0–0)
PLATELET # BLD AUTO: 268 K/UL — SIGNIFICANT CHANGE UP (ref 150–400)
POTASSIUM SERPL-MCNC: 4.2 MMOL/L — SIGNIFICANT CHANGE UP (ref 3.5–5.3)
POTASSIUM SERPL-SCNC: 4.2 MMOL/L — SIGNIFICANT CHANGE UP (ref 3.5–5.3)
RBC # BLD: 4.27 M/UL — SIGNIFICANT CHANGE UP (ref 3.8–5.2)
RBC # FLD: 13.7 % — SIGNIFICANT CHANGE UP (ref 10.3–14.5)
SODIUM SERPL-SCNC: 138 MMOL/L — SIGNIFICANT CHANGE UP (ref 135–145)
WBC # BLD: 6.25 K/UL — SIGNIFICANT CHANGE UP (ref 3.8–10.5)
WBC # FLD AUTO: 6.25 K/UL — SIGNIFICANT CHANGE UP (ref 3.8–10.5)

## 2023-03-07 PROCEDURE — G0463: CPT

## 2023-03-07 PROCEDURE — 36415 COLL VENOUS BLD VENIPUNCTURE: CPT

## 2023-03-07 PROCEDURE — 80048 BASIC METABOLIC PNL TOTAL CA: CPT

## 2023-03-07 PROCEDURE — 87640 STAPH A DNA AMP PROBE: CPT

## 2023-03-07 PROCEDURE — 87641 MR-STAPH DNA AMP PROBE: CPT

## 2023-03-07 PROCEDURE — 85027 COMPLETE CBC AUTOMATED: CPT

## 2023-03-07 RX ORDER — IBUPROFEN 200 MG
1 TABLET ORAL
Qty: 0 | Refills: 0 | DISCHARGE

## 2023-03-07 RX ORDER — SODIUM CHLORIDE 9 MG/ML
1000 INJECTION, SOLUTION INTRAVENOUS
Refills: 0 | Status: DISCONTINUED | OUTPATIENT
Start: 2023-03-23 | End: 2023-04-06

## 2023-03-07 RX ORDER — CHLORHEXIDINE GLUCONATE 213 G/1000ML
1 SOLUTION TOPICAL DAILY
Refills: 0 | Status: DISCONTINUED | OUTPATIENT
Start: 2023-03-23 | End: 2023-04-06

## 2023-03-07 RX ORDER — SODIUM CHLORIDE 9 MG/ML
3 INJECTION INTRAMUSCULAR; INTRAVENOUS; SUBCUTANEOUS EVERY 8 HOURS
Refills: 0 | Status: DISCONTINUED | OUTPATIENT
Start: 2023-03-23 | End: 2023-04-06

## 2023-03-07 NOTE — H&P PST ADULT - NSICDXPASTSURGICALHX_GEN_ALL_CORE_FT
PAST SURGICAL HISTORY:  H/O microdiscectomy 11/2016    S/P lumbar fusion     S/P partial thyroidectomy 2017

## 2023-03-07 NOTE — H&P PST ADULT - HISTORY OF PRESENT ILLNESS
38 yo F with PMHx of Hypothyroidism,  36 yo F with PMHx of Hypothyroidism, lumbar herniated disc, sp lumbar laminectomy and discectomy at the L4-5 level (11/2016), repeat laminectomy and an instrumented fusion at the L4-5 (2017), sp partial thyroidectomy (thyroid nodules) who reports a several year history of lower back pain radiating to BL legs associated with numbness and tingling. Conservative therapies include ibuprofen, Percocet, epidural steroid injections and Physical Therapy. She is scheduled for Percutaneous Thoracic Spinal Cord Stimulator Battery Trial on 3/23/23.     Denies Recent travel, Exposure or Covid symptoms  Covid test 3/21/23

## 2023-03-07 NOTE — H&P PST ADULT - ASSESSMENT
DASI score:  DASI activity:  Loose teeth or denture: no    DASI score: 6.7 mets  DASI activity: able to walk 30 minutes, grocery shopping, moderate cleaning   Loose teeth or denture: no

## 2023-03-07 NOTE — H&P PST ADULT - WAS YOUR LAST COVID-19 VACCINE GREATER THAN OR EQUAL TO TWO MONTHS AGO?
Yes
Patient
No fracture or swelling on xray. Pain mildly improved with oxycodone and morphine. Patient to be discharged. Encouraged to take advil OTC for pain control. Will prescribe flexiril as needed.

## 2023-03-08 LAB
MRSA PCR RESULT.: SIGNIFICANT CHANGE UP
S AUREUS DNA NOSE QL NAA+PROBE: SIGNIFICANT CHANGE UP

## 2023-03-16 ENCOUNTER — NON-APPOINTMENT (OUTPATIENT)
Age: 38
End: 2023-03-16

## 2023-03-16 RX ORDER — LEVOTHYROXINE SODIUM 0.12 MG/1
125 TABLET ORAL
Refills: 0 | Status: ACTIVE | COMMUNITY

## 2023-03-22 ENCOUNTER — TRANSCRIPTION ENCOUNTER (OUTPATIENT)
Age: 38
End: 2023-03-22

## 2023-03-23 ENCOUNTER — TRANSCRIPTION ENCOUNTER (OUTPATIENT)
Age: 38
End: 2023-03-23

## 2023-03-23 ENCOUNTER — APPOINTMENT (OUTPATIENT)
Dept: SPINE | Facility: HOSPITAL | Age: 38
End: 2023-03-23

## 2023-03-23 ENCOUNTER — OUTPATIENT (OUTPATIENT)
Dept: OUTPATIENT SERVICES | Facility: HOSPITAL | Age: 38
LOS: 1 days | End: 2023-03-23
Payer: MEDICAID

## 2023-03-23 VITALS
HEIGHT: 65 IN | WEIGHT: 209 LBS | TEMPERATURE: 98 F | OXYGEN SATURATION: 100 % | DIASTOLIC BLOOD PRESSURE: 70 MMHG | SYSTOLIC BLOOD PRESSURE: 102 MMHG | RESPIRATION RATE: 16 BRPM | HEART RATE: 60 BPM

## 2023-03-23 VITALS
DIASTOLIC BLOOD PRESSURE: 67 MMHG | TEMPERATURE: 97 F | OXYGEN SATURATION: 99 % | SYSTOLIC BLOOD PRESSURE: 101 MMHG | RESPIRATION RATE: 16 BRPM | HEART RATE: 75 BPM

## 2023-03-23 DIAGNOSIS — M54.42 LUMBAGO WITH SCIATICA, LEFT SIDE: ICD-10-CM

## 2023-03-23 DIAGNOSIS — E89.0 POSTPROCEDURAL HYPOTHYROIDISM: Chronic | ICD-10-CM

## 2023-03-23 DIAGNOSIS — Z98.890 OTHER SPECIFIED POSTPROCEDURAL STATES: Chronic | ICD-10-CM

## 2023-03-23 DIAGNOSIS — Z98.1 ARTHRODESIS STATUS: Chronic | ICD-10-CM

## 2023-03-23 PROCEDURE — 76000 FLUOROSCOPY <1 HR PHYS/QHP: CPT

## 2023-03-23 PROCEDURE — 95972 ALYS CPLX SP/PN NPGT W/PRGRM: CPT | Mod: 59

## 2023-03-23 PROCEDURE — 63650 IMPLANT NEUROELECTRODES: CPT

## 2023-03-23 PROCEDURE — C1778: CPT

## 2023-03-23 PROCEDURE — 63685 INS/RPLC SPI NPG/RCVR POCKET: CPT

## 2023-03-23 PROCEDURE — C1889: CPT

## 2023-03-23 PROCEDURE — C1897: CPT

## 2023-03-23 PROCEDURE — C9399: CPT

## 2023-03-23 DEVICE — ANCHOR LEAD CLIK X 4CM: Type: IMPLANTABLE DEVICE | Status: FUNCTIONAL

## 2023-03-23 DEVICE — IMPLANTABLE DEVICE: Type: IMPLANTABLE DEVICE | Status: FUNCTIONAL

## 2023-03-23 RX ORDER — LIDOCAINE HCL 20 MG/ML
0.2 VIAL (ML) INJECTION ONCE
Refills: 0 | Status: COMPLETED | OUTPATIENT
Start: 2023-03-23 | End: 2023-03-23

## 2023-03-23 RX ORDER — OXYCODONE HYDROCHLORIDE 5 MG/1
10 TABLET ORAL ONCE
Refills: 0 | Status: DISCONTINUED | OUTPATIENT
Start: 2023-03-23 | End: 2023-03-23

## 2023-03-23 RX ORDER — ONDANSETRON 8 MG/1
4 TABLET, FILM COATED ORAL ONCE
Refills: 0 | Status: COMPLETED | OUTPATIENT
Start: 2023-03-23 | End: 2023-03-23

## 2023-03-23 RX ORDER — HYDROMORPHONE HYDROCHLORIDE 2 MG/ML
0.5 INJECTION INTRAMUSCULAR; INTRAVENOUS; SUBCUTANEOUS
Refills: 0 | Status: DISCONTINUED | OUTPATIENT
Start: 2023-03-23 | End: 2023-03-23

## 2023-03-23 RX ORDER — ACETAMINOPHEN 500 MG
1000 TABLET ORAL ONCE
Refills: 0 | Status: DISCONTINUED | OUTPATIENT
Start: 2023-03-23 | End: 2023-04-06

## 2023-03-23 RX ORDER — GABAPENTIN 400 MG/1
1 CAPSULE ORAL
Qty: 0 | Refills: 0 | DISCHARGE

## 2023-03-23 RX ORDER — METHOCARBAMOL 500 MG/1
1 TABLET, FILM COATED ORAL
Qty: 0 | Refills: 0 | DISCHARGE

## 2023-03-23 RX ORDER — OXYCODONE HYDROCHLORIDE 5 MG/1
5 TABLET ORAL ONCE
Refills: 0 | Status: DISCONTINUED | OUTPATIENT
Start: 2023-03-23 | End: 2023-03-23

## 2023-03-23 RX ORDER — LEVOTHYROXINE SODIUM 125 MCG
1 TABLET ORAL
Qty: 0 | Refills: 0 | DISCHARGE

## 2023-03-23 RX ORDER — OXYCODONE AND ACETAMINOPHEN 5; 325 MG/1; MG/1
1 TABLET ORAL
Qty: 0 | Refills: 0 | DISCHARGE

## 2023-03-23 RX ORDER — ACETAMINOPHEN AND IBUPROFEN 250; 125 MG/1; MG/1
1 TABLET ORAL
Qty: 0 | Refills: 0 | DISCHARGE

## 2023-03-23 RX ADMIN — ONDANSETRON 4 MILLIGRAM(S): 8 TABLET, FILM COATED ORAL at 12:55

## 2023-03-23 RX ADMIN — OXYCODONE HYDROCHLORIDE 10 MILLIGRAM(S): 5 TABLET ORAL at 13:46

## 2023-03-23 RX ADMIN — OXYCODONE HYDROCHLORIDE 10 MILLIGRAM(S): 5 TABLET ORAL at 14:14

## 2023-03-23 RX ADMIN — HYDROMORPHONE HYDROCHLORIDE 0.5 MILLIGRAM(S): 2 INJECTION INTRAMUSCULAR; INTRAVENOUS; SUBCUTANEOUS at 13:15

## 2023-03-23 RX ADMIN — HYDROMORPHONE HYDROCHLORIDE 0.5 MILLIGRAM(S): 2 INJECTION INTRAMUSCULAR; INTRAVENOUS; SUBCUTANEOUS at 12:56

## 2023-03-23 RX ADMIN — SODIUM CHLORIDE 100 MILLILITER(S): 9 INJECTION, SOLUTION INTRAVENOUS at 07:20

## 2023-03-23 RX ADMIN — SODIUM CHLORIDE 100 MILLILITER(S): 9 INJECTION, SOLUTION INTRAVENOUS at 12:56

## 2023-03-23 RX ADMIN — SODIUM CHLORIDE 3 MILLILITER(S): 9 INJECTION INTRAMUSCULAR; INTRAVENOUS; SUBCUTANEOUS at 12:43

## 2023-03-23 NOTE — ASU DISCHARGE PLAN (ADULT/PEDIATRIC) - NS MD DC FALL RISK RISK
For information on Fall & Injury Prevention, visit: [email or web address] OR  [email or web address] OR  https://www.cdc.gov/steadi/patient.html

## 2023-03-23 NOTE — ASU DISCHARGE PLAN (ADULT/PEDIATRIC) - DISCHARGE TO
Patient arrives with complaint of left shoulder pain that started four days ago. Denies any injury. Reports that he woke up with the pain.
Home

## 2023-03-23 NOTE — ASU PATIENT PROFILE, ADULT - FALL HARM RISK - PT AGE POPULATION HIDDEN
Date of Service: 11/25/2019    SUBJECTIVE:  The patient returns, 14 days out, doing well.  Sutures were removed.  Steri-Strips were applied.  No signs of infection.  Minimal separation of the distal portion of the incision, the patient not concerned.    DIAGNOSIS:  Status post left carpal tunnel release, doing well.    PLAN:  The patient asked to return to light duty as of tomorrow, forms completed today.  Will see him in 4 weeks as needed.  He will call us to adjust his restrictions as needed.  He agreed with this treatment plan.  All questions answered.  He was pleased with his result.      Dictated By: Vincent García III, MD  Signing Provider: Vincent García III, MD AB/naomie ([id number])  DD: 11/25/2019 16:51:32 TD: 11/27/2019 01:46:23    Copy Sent To:      Adult

## 2023-03-23 NOTE — ASU PATIENT PROFILE, ADULT - FALL HARM RISK - UNIVERSAL INTERVENTIONS
Bed in lowest position, wheels locked, appropriate side rails in place/Call bell, personal items and telephone in reach/Instruct patient to call for assistance before getting out of bed or chair/Non-slip footwear when patient is out of bed/[place] to call system/Physically safe environment - no spills, clutter or unnecessary equipment/Purposeful Proactive Rounding/Room/bathroom lighting operational, light cord in reach

## 2023-03-23 NOTE — ASU DISCHARGE PLAN (ADULT/PEDIATRIC) - NURSING INSTRUCTIONS
OK to take Tylenol/Acetaminophen at 4:40 PM TODAY for pain and every 6 hours after as needed. OK to take Motrin/Ibuprofen AS SOON AS NEEDED for pain and every 6 hours after as needed.

## 2023-03-23 NOTE — ASU DISCHARGE PLAN (ADULT/PEDIATRIC) - CARE PROVIDER_API CALL
Holden Torres (MD)  Neurosurgery  [address], [address]  [place] [address]  Phone: [phone]  Fax: [phone]  Follow Up Time:

## 2023-03-29 NOTE — PATIENT PROFILE ADULT - BRADEN SENSORY
[de-identified] : NORM PENNINGTON is a 46 year old patient with a history of a left sensorineural hearing loss here for a right ear infection.  On Sunday she developed right ear pain with fullness and hearing loss.  She may have gotten water in the ear on Saturday.  She did not use a Q-tip.  [facility] to [facility] and was placed on azithromycin.  She still has fullness and hearing loss in the ear.  She has no otorrhea or dizziness.  Approximately 20 years ago, she had a left sided hearing loss following an infection.  She said that she had an MRI at that time.  She is on intravenous antibiotics.  She uses a CROS hearing aid.  She has had mild cough for 2 weeks.\par \par No history of recurrent middle ear infections, prior otologic surgery, or excessive noise exposure
(3) slightly limited

## 2023-03-30 ENCOUNTER — APPOINTMENT (OUTPATIENT)
Dept: SPINE | Facility: CLINIC | Age: 38
End: 2023-03-30
Payer: MEDICAID

## 2023-03-30 VITALS
HEART RATE: 71 BPM | DIASTOLIC BLOOD PRESSURE: 74 MMHG | BODY MASS INDEX: 34.99 KG/M2 | HEIGHT: 65 IN | SYSTOLIC BLOOD PRESSURE: 107 MMHG | OXYGEN SATURATION: 100 % | WEIGHT: 210 LBS

## 2023-03-30 DIAGNOSIS — M54.42 LUMBAGO WITH SCIATICA, LEFT SIDE: ICD-10-CM

## 2023-03-30 DIAGNOSIS — G89.29 LUMBAGO WITH SCIATICA, LEFT SIDE: ICD-10-CM

## 2023-03-30 DIAGNOSIS — M54.41 LUMBAGO WITH SCIATICA, LEFT SIDE: ICD-10-CM

## 2023-03-30 PROCEDURE — 99024 POSTOP FOLLOW-UP VISIT: CPT

## 2023-03-30 RX ORDER — FAMOTIDINE 10 MG/1
TABLET, FILM COATED ORAL
Refills: 0 | Status: ACTIVE | COMMUNITY

## 2023-03-30 RX ORDER — OXYCODONE HYDROCHLORIDE AND ACETAMINOPHEN 10; 325 MG/1; MG/1
TABLET ORAL
Refills: 0 | Status: ACTIVE | COMMUNITY

## 2023-03-30 NOTE — PHYSICAL EXAM
[General Appearance - Alert] : alert [General Appearance - In No Acute Distress] : in no acute distress [General Appearance - Well Nourished] : well nourished [General Appearance - Well Developed] : well developed [General Appearance - Well-Appearing] : healthy appearing [] : normal voice and communication [Clean] : unclean [Dry] : moist [Intact] : intact [Sutures Intact] : closed with intact sutures [No Drainage] : without drainage [Erythema] : not erythematous [Normal Skin] : normal [Warm] : not warm [Normal Skin Turgor] : skin turgor was normal [FreeTextEntry1] : The spinal cord stimulator lead sites are dry with no redness, swelling, or drainage.

## 2023-03-30 NOTE — PROCEDURE
[FreeTextEntry1] : The stimulator was turned off.  The patient lied down prone and the tape was removed.  The sutures were removed.  The leads were pulled out with good visualization of the tips.  Pressure was applied to the lead sites for 5 minutes and the area was cleaned and a dry sterile dressing was applied.  The patient sat up and tolerated the procedure well.

## 2023-03-30 NOTE — HISTORY OF PRESENT ILLNESS
[FreeTextEntry1] : ILEANA SMALL is a 37 year old lady who is s/p an L4-L5 laminectomy and fusion with Dr. Sugar leyva in 2017.  She did well after surgery; however, she does started with ongoing chronic low back pain radiating to her buttock and into her both legs, more so on the left than on the right.  She also gets some paresthesias into her feet, particularly when sitting for a long period of time.  She had done physical therapy, but had to stop due to pain.  The patient also received epidural steroid injections without any relief.  The patient underwent placement of left and right thoracic spinal cord stimulator arrays for a spinal cord stimulator trial with a [employer] system.\par \par

## 2023-03-30 NOTE — REASON FOR VISIT
[de-identified] : 1. Percutaneous placement of right thoracic spinal cord stimulator trial electrode array.  2.  Percutaneous placement of left thoracic spinal cord stimulator electrode array. 3.  Complex programming of pulse generator.  4. [employer] system. [de-identified] : 03/23/2023 [de-identified] : 7 [de-identified] : 1 [de-identified] : The patient stated that she did have relief of her pain for the first 2-3 days.  Her pain was decreased to 5-7 and it was 8-9.  She did not take her medications the fist couple of days.  However, her pain returned to what it was on the third day.  She did experience more pain when the stimulator was turned off.

## 2023-03-30 NOTE — ASSESSMENT
[FreeTextEntry1] : The patient stated that her back and leg pain became worse after the stimulator was turned off and removed.  Wound care was discussed.  She is not to shower until tomorrow.  The patient is to return to the office in two weeks to discuss whether she wants to consider permanent placement of a [employer] Spinal cord stimulator with Dr. Holden Torres.

## 2023-05-03 ENCOUNTER — APPOINTMENT (OUTPATIENT)
Dept: SPINE | Facility: CLINIC | Age: 38
End: 2023-05-03

## 2023-05-25 ENCOUNTER — APPOINTMENT (OUTPATIENT)
Dept: SPINE | Facility: HOSPITAL | Age: 38
End: 2023-05-25

## 2023-07-04 NOTE — H&P ADULT - BIRTH SEX
Patient placed on cardiac monitor, continuous pulse oximeter, and NIBP monitor. Monitor alarms on.         Libra Lambert RN  07/04/23 [id number] Female

## 2024-07-02 ENCOUNTER — APPOINTMENT (OUTPATIENT)
Dept: NEUROLOGY | Facility: CLINIC | Age: 39
End: 2024-07-02
Payer: MEDICAID

## 2024-07-02 DIAGNOSIS — G93.0 CEREBRAL CYSTS: ICD-10-CM

## 2024-07-02 PROCEDURE — 99204 OFFICE O/P NEW MOD 45 MIN: CPT

## 2024-07-09 ENCOUNTER — APPOINTMENT (OUTPATIENT)
Dept: MRI IMAGING | Facility: CLINIC | Age: 39
End: 2024-07-09

## 2024-07-11 ENCOUNTER — OUTPATIENT (OUTPATIENT)
Dept: OUTPATIENT SERVICES | Facility: HOSPITAL | Age: 39
LOS: 1 days | End: 2024-07-11

## 2024-07-11 ENCOUNTER — APPOINTMENT (OUTPATIENT)
Dept: MRI IMAGING | Facility: CLINIC | Age: 39
End: 2024-07-11
Payer: MEDICAID

## 2024-07-11 DIAGNOSIS — Z98.890 OTHER SPECIFIED POSTPROCEDURAL STATES: Chronic | ICD-10-CM

## 2024-07-11 DIAGNOSIS — E89.0 POSTPROCEDURAL HYPOTHYROIDISM: Chronic | ICD-10-CM

## 2024-07-11 DIAGNOSIS — Z98.1 ARTHRODESIS STATUS: Chronic | ICD-10-CM

## 2024-07-11 PROCEDURE — 70553 MRI BRAIN STEM W/O & W/DYE: CPT | Mod: 26

## 2024-12-19 NOTE — PATIENT PROFILE ADULT. - FUNCTIONAL SCREEN CURRENT LEVEL: COMMUNICATION, MLM
MRI order entered.  Left message for patient that call attempted and informed her that a [employer] message would be sent with details.  Patient can either respond to [employer] message or return call to clinic.     (0) understands/communicates without difficulty

## 2025-06-05 ENCOUNTER — APPOINTMENT (OUTPATIENT)
Dept: MRI IMAGING | Facility: IMAGING CENTER | Age: 40
End: 2025-06-05
Payer: MEDICAID

## 2025-06-05 ENCOUNTER — OUTPATIENT (OUTPATIENT)
Dept: OUTPATIENT SERVICES | Facility: HOSPITAL | Age: 40
LOS: 1 days | End: 2025-06-05
Payer: MEDICAID

## 2025-06-05 DIAGNOSIS — M54.17 RADICULOPATHY, LUMBOSACRAL REGION: ICD-10-CM

## 2025-06-05 DIAGNOSIS — Z98.1 ARTHRODESIS STATUS: Chronic | ICD-10-CM

## 2025-06-05 DIAGNOSIS — Z98.890 OTHER SPECIFIED POSTPROCEDURAL STATES: Chronic | ICD-10-CM

## 2025-06-05 DIAGNOSIS — Z00.8 ENCOUNTER FOR OTHER GENERAL EXAMINATION: ICD-10-CM

## 2025-06-05 DIAGNOSIS — E89.0 POSTPROCEDURAL HYPOTHYROIDISM: Chronic | ICD-10-CM

## 2025-06-05 PROCEDURE — 72148 MRI LUMBAR SPINE W/O DYE: CPT

## 2025-06-05 PROCEDURE — 72148 MRI LUMBAR SPINE W/O DYE: CPT | Mod: 26

## (undated) DEVICE — ELCTR BOVIE TIP BLADE INSULATED 2.75" EDGE

## (undated) DEVICE — SYR LOR GLASS 5ML

## (undated) DEVICE — DRAPE TOWEL BLUE STICKY

## (undated) DEVICE — DRSG STERISTRIPS 0.5 X 4"

## (undated) DEVICE — GLV 7.5 PROTEXIS (WHITE)

## (undated) DEVICE — NDL CVD TIP

## (undated) DEVICE — DRSG CURITY GAUZE SPONGE 4 X 4" 12-PLY

## (undated) DEVICE — EPIDURAL KIT DURAFLEX CONTINUOUS FIXED 17G

## (undated) DEVICE — DRAPE THYROID 77" X 123"

## (undated) DEVICE — DRAPE IOBAN 23" X 23"

## (undated) DEVICE — MARKING PEN W RULER

## (undated) DEVICE — PACK MINOR

## (undated) DEVICE — GLV 7 PROTEXIS (WHITE)

## (undated) DEVICE — GLV 8 RADIATION

## (undated) DEVICE — SPECIMEN CONTAINER 100ML

## (undated) DEVICE — GOWN TRIMAX LG

## (undated) DEVICE — NDL HYPO REGULAR BEVEL 22G X 1.5" (TURQUOISE)

## (undated) DEVICE — STAPLER SKIN VISI-STAT 35 WIDE

## (undated) DEVICE — DRSG TEGADERM 3.5X6"

## (undated) DEVICE — BOSTON SCIENTIFIC HEX WRENCH 7.6CM DISP

## (undated) DEVICE — WARMING BLANKET LOWER ADULT

## (undated) DEVICE — DRAPE EQUIPMENT COVER 27"

## (undated) DEVICE — MEDICATION LABELS W MARKER

## (undated) DEVICE — GLV 6.5 PROTEXIS (WHITE)

## (undated) DEVICE — DRAPE EQUIPMENT BANDED BAG 30 X 30" (SHOWER CAP)

## (undated) DEVICE — NDL HYPO SAFE 18G X 1.5" (PINK)

## (undated) DEVICE — POSITIONER JACKSON TABLE HEADREST 7", CHEST, HIP, THIGH PADS, ARM CRADLE

## (undated) DEVICE — DRAPE 1/2 SHEET 40X57"

## (undated) DEVICE — DRSG TEGADERM 6"X8"

## (undated) DEVICE — DRAPE TOWEL BLUE 17" X 24"

## (undated) DEVICE — VENODYNE/SCD SLEEVE CALF MEDIUM

## (undated) DEVICE — BOSTON SCIENTIFIC FIXATE SUTURING DEVICE

## (undated) DEVICE — DRAPE 3/4 SHEET W REINFORCEMENT 56X77"

## (undated) DEVICE — Device

## (undated) DEVICE — SYR LUER LOK 20CC

## (undated) DEVICE — SUT SOFSILK 2-0 18" C-23

## (undated) DEVICE — SOL IRR POUR H2O 250ML